# Patient Record
Sex: MALE | Race: OTHER | Employment: FULL TIME | URBAN - METROPOLITAN AREA
[De-identification: names, ages, dates, MRNs, and addresses within clinical notes are randomized per-mention and may not be internally consistent; named-entity substitution may affect disease eponyms.]

---

## 2017-01-05 ENCOUNTER — OFFICE VISIT (OUTPATIENT)
Dept: FAMILY MEDICINE CLINIC | Facility: CLINIC | Age: 37
End: 2017-01-05

## 2017-01-05 VITALS
OXYGEN SATURATION: 98 % | SYSTOLIC BLOOD PRESSURE: 122 MMHG | HEIGHT: 68 IN | HEART RATE: 91 BPM | BODY MASS INDEX: 34.1 KG/M2 | RESPIRATION RATE: 18 BRPM | WEIGHT: 225 LBS | TEMPERATURE: 98 F | DIASTOLIC BLOOD PRESSURE: 80 MMHG

## 2017-01-05 DIAGNOSIS — B34.9 SYSTEMIC VIRAL ILLNESS: Primary | ICD-10-CM

## 2017-01-05 PROCEDURE — 99213 OFFICE O/P EST LOW 20 MIN: CPT | Performed by: FAMILY MEDICINE

## 2017-01-05 RX ORDER — MECLIZINE HYDROCHLORIDE 50 MG/1
50 TABLET ORAL 3 TIMES DAILY PRN
Qty: 30 TABLET | Refills: 0 | Status: SHIPPED | OUTPATIENT
Start: 2017-01-05 | End: 2017-02-08

## 2017-01-05 RX ORDER — OSELTAMIVIR PHOSPHATE 75 MG/1
75 CAPSULE ORAL 2 TIMES DAILY
Qty: 10 CAPSULE | Refills: 0 | Status: SHIPPED | OUTPATIENT
Start: 2017-01-05 | End: 2017-01-10

## 2017-01-06 NOTE — PROGRESS NOTES
Naomi Justin is a 31year old male. Patient presents with:  Cough: congestion, dizzy, x 1 week      HPI:   Cough and congestion x1 week. Also having dizziness - lightheadedness, also for 1 week. Feels very tired. Occasional sinus headache.   No tonya as needed for Wheezing or Shortness of Breath. Disp: 1 Inhaler Rfl: 0   GLYXAMBI 25-5 MG Oral Tab TAKE 1 TABLET BY MOUTH DAILY Disp: 90 tablet Rfl: 0   Empagliflozin-Linagliptin (GLYXAMBI) 25-5 MG Oral Tab Take 1 tablet by mouth daily.  Disp: 21 tablet Rfl: tablet 0      Sig: Take 1 tablet (50 mg total) by mouth 3 (three) times daily as needed for Dizziness. Patient/Caregiver Education: Patient/Caregiver Education: There are no barriers to learning. Medical education done.    Outcome: Patient Buddy Stack

## 2017-02-08 ENCOUNTER — OFFICE VISIT (OUTPATIENT)
Dept: FAMILY MEDICINE CLINIC | Facility: CLINIC | Age: 37
End: 2017-02-08

## 2017-02-08 VITALS
RESPIRATION RATE: 16 BRPM | DIASTOLIC BLOOD PRESSURE: 84 MMHG | WEIGHT: 217 LBS | TEMPERATURE: 99 F | HEART RATE: 84 BPM | BODY MASS INDEX: 33 KG/M2 | SYSTOLIC BLOOD PRESSURE: 122 MMHG

## 2017-02-08 DIAGNOSIS — J01.00 ACUTE MAXILLARY SINUSITIS, RECURRENCE NOT SPECIFIED: Primary | ICD-10-CM

## 2017-02-08 PROCEDURE — 99213 OFFICE O/P EST LOW 20 MIN: CPT | Performed by: PHYSICIAN ASSISTANT

## 2017-02-08 RX ORDER — LEVOFLOXACIN 500 MG/1
500 TABLET, FILM COATED ORAL DAILY
Qty: 7 TABLET | Refills: 0 | Status: SHIPPED | OUTPATIENT
Start: 2017-02-08 | End: 2017-02-15 | Stop reason: ALTCHOICE

## 2017-02-08 RX ORDER — AMOXICILLIN 500 MG/1
CAPSULE ORAL
Refills: 0 | COMMUNITY
Start: 2017-01-24 | End: 2017-02-08

## 2017-02-08 NOTE — PATIENT INSTRUCTIONS
Self-Care for Sinusitis     Drinking plenty of water can help sinuses drain. Sinusitis can often be managed with self-care. Self-care can keep sinuses moist and make you feel more comfortable. Remember to follow your doctor's instructions closely.  This Colds, flu, and allergies make it more likely for you to get sinusitis. Do your best to prevent sinusitis by preventing these problems. Do what you can to avoid getting colds and other infections. Stay away from things that cause allergies (allergens).  Gurvinder Javier · Stay away from all types of smoke, which dries out sinus linings. This includes tobacco smoke and chemical smoke in workplace settings. · Use saltwater rinses.   Date Last Reviewed: 10/1/2016  © 5377-7384 The [address]

## 2017-02-08 NOTE — PROGRESS NOTES
CHIEF COMPLAINT:   Patient presents with:  Sinusitis: Symptoms started about two days ago    HPI:   Lacey Starkey is a 31year old male who presents for sinus congestion for  2  days. Symptoms have been worsening since onset.  Sinus congestion/pain is d • Fibromyalgia Mother         Smoking Status: Never Smoker                      Smokeless Status: Never Used                        Alcohol Use: Yes                Comment: 2-3 beers a week        REVIEW OF SYSTEMS:   GENERAL: feels well otherwise, no unpl Sinusitis can often be managed with self-care. Self-care can keep sinuses moist and make you feel more comfortable. Remember to follow your doctor's instructions closely. This can make a big difference in getting your sinus problem under control.   Drink fl Colds, flu, and allergies make it more likely for you to get sinusitis. Do your best to prevent sinusitis by preventing these problems. Do what you can to avoid getting colds and other infections. Stay away from things that cause allergies (allergens).  Jessy Mooney · Stay away from all types of smoke, which dries out sinus linings. This includes tobacco smoke and chemical smoke in workplace settings. · Use saltwater rinses.   Date Last Reviewed: 10/1/2016  © [id number] The [address]

## 2017-02-15 ENCOUNTER — LAB ENCOUNTER (OUTPATIENT)
Dept: LAB | Age: 37
End: 2017-02-15
Attending: PHYSICIAN ASSISTANT
Payer: COMMERCIAL

## 2017-02-15 ENCOUNTER — OFFICE VISIT (OUTPATIENT)
Dept: FAMILY MEDICINE CLINIC | Facility: CLINIC | Age: 37
End: 2017-02-15

## 2017-02-15 VITALS
RESPIRATION RATE: 16 BRPM | BODY MASS INDEX: 32.28 KG/M2 | HEART RATE: 89 BPM | DIASTOLIC BLOOD PRESSURE: 78 MMHG | SYSTOLIC BLOOD PRESSURE: 120 MMHG | WEIGHT: 213 LBS | OXYGEN SATURATION: 97 % | HEIGHT: 68 IN | TEMPERATURE: 98 F

## 2017-02-15 DIAGNOSIS — Z13.228 SCREENING FOR ENDOCRINE, METABOLIC AND IMMUNITY DISORDER: ICD-10-CM

## 2017-02-15 DIAGNOSIS — Z13.29 SCREENING FOR ENDOCRINE, METABOLIC AND IMMUNITY DISORDER: ICD-10-CM

## 2017-02-15 DIAGNOSIS — Z13.0 SCREENING FOR ENDOCRINE, METABOLIC AND IMMUNITY DISORDER: ICD-10-CM

## 2017-02-15 DIAGNOSIS — R80.9 TYPE 2 DIABETES MELLITUS WITH MICROALBUMINURIA, WITHOUT LONG-TERM CURRENT USE OF INSULIN (HCC): ICD-10-CM

## 2017-02-15 DIAGNOSIS — E11.29 TYPE 2 DIABETES MELLITUS WITH MICROALBUMINURIA, WITHOUT LONG-TERM CURRENT USE OF INSULIN (HCC): ICD-10-CM

## 2017-02-15 DIAGNOSIS — Z00.00 ROUTINE PHYSICAL EXAMINATION: Primary | ICD-10-CM

## 2017-02-15 LAB
25-HYDROXYVITAMIN D (TOTAL): 11.3 NG/ML (ref 30–100)
ALBUMIN SERPL-MCNC: 4.1 G/DL (ref 3.5–4.8)
ALP LIVER SERPL-CCNC: 112 U/L (ref 45–117)
ALT SERPL-CCNC: 43 U/L (ref 17–63)
AST SERPL-CCNC: 10 U/L (ref 15–41)
BASOPHILS # BLD AUTO: 0.04 X10(3) UL (ref 0–0.1)
BASOPHILS NFR BLD AUTO: 0.4 %
BILIRUB SERPL-MCNC: 0.5 MG/DL (ref 0.1–2)
BUN BLD-MCNC: 12 MG/DL (ref 8–20)
CALCIUM BLD-MCNC: 9.1 MG/DL (ref 8.3–10.3)
CHLORIDE: 98 MMOL/L (ref 101–111)
CHOLEST SMN-MCNC: 193 MG/DL (ref ?–200)
CO2: 29 MMOL/L (ref 22–32)
CREAT BLD-MCNC: 0.8 MG/DL (ref 0.7–1.3)
EOSINOPHIL # BLD AUTO: 0.09 X10(3) UL (ref 0–0.3)
EOSINOPHIL NFR BLD AUTO: 1 %
ERYTHROCYTE [DISTWIDTH] IN BLOOD BY AUTOMATED COUNT: 13.9 % (ref 11.5–16)
EST. AVERAGE GLUCOSE BLD GHB EST-MCNC: 275 MG/DL (ref 68–126)
GLUCOSE BLD-MCNC: 304 MG/DL (ref 70–99)
HBA1C MFR BLD HPLC: 11.2 % (ref ?–5.7)
HCT VFR BLD AUTO: 46 % (ref 37–53)
HDLC SERPL-MCNC: 29 MG/DL (ref 45–?)
HDLC SERPL: 6.66 {RATIO} (ref ?–4.97)
HGB BLD-MCNC: 15.2 G/DL (ref 13–17)
IMMATURE GRANULOCYTE COUNT: 0.04 X10(3) UL (ref 0–1)
IMMATURE GRANULOCYTE RATIO %: 0.4 %
LDLC SERPL CALC-MCNC: 101 MG/DL (ref ?–130)
LYMPHOCYTES # BLD AUTO: 3.1 X10(3) UL (ref 0.9–4)
LYMPHOCYTES NFR BLD AUTO: 34.3 %
M PROTEIN MFR SERPL ELPH: 8.6 G/DL (ref 6.1–8.3)
MCH RBC QN AUTO: 26.7 PG (ref 27–33.2)
MCHC RBC AUTO-ENTMCNC: 33 G/DL (ref 31–37)
MCV RBC AUTO: 80.8 FL (ref 80–99)
MONOCYTES # BLD AUTO: 0.48 X10(3) UL (ref 0.1–0.6)
MONOCYTES NFR BLD AUTO: 5.3 %
NEUTROPHIL ABS PRELIM: 5.3 X10 (3) UL (ref 1.3–6.7)
NEUTROPHILS # BLD AUTO: 5.3 X10(3) UL (ref 1.3–6.7)
NEUTROPHILS NFR BLD AUTO: 58.6 %
NONHDLC SERPL-MCNC: 164 MG/DL (ref ?–130)
PLATELET # BLD AUTO: 316 10(3)UL (ref 150–450)
POTASSIUM SERPL-SCNC: 3.8 MMOL/L (ref 3.6–5.1)
RBC # BLD AUTO: 5.69 X10(6)UL (ref 4.3–5.7)
RED CELL DISTRIBUTION WIDTH-SD: 40.4 FL (ref 35.1–46.3)
SODIUM SERPL-SCNC: 136 MMOL/L (ref 136–144)
TRIGLYCERIDES: 316 MG/DL (ref ?–150)
TSI SER-ACNC: 0.95 MIU/ML (ref 0.35–5.5)
VLDL: 63 MG/DL (ref 5–40)
WBC # BLD AUTO: 9.1 X10(3) UL (ref 4–13)

## 2017-02-15 PROCEDURE — 84443 ASSAY THYROID STIM HORMONE: CPT

## 2017-02-15 PROCEDURE — 85025 COMPLETE CBC W/AUTO DIFF WBC: CPT

## 2017-02-15 PROCEDURE — 80061 LIPID PANEL: CPT

## 2017-02-15 PROCEDURE — 83036 HEMOGLOBIN GLYCOSYLATED A1C: CPT

## 2017-02-15 PROCEDURE — 36415 COLL VENOUS BLD VENIPUNCTURE: CPT

## 2017-02-15 PROCEDURE — 99395 PREV VISIT EST AGE 18-39: CPT | Performed by: PHYSICIAN ASSISTANT

## 2017-02-15 PROCEDURE — 80053 COMPREHEN METABOLIC PANEL: CPT

## 2017-02-15 PROCEDURE — 82306 VITAMIN D 25 HYDROXY: CPT

## 2017-02-15 RX ORDER — MECLIZINE HYDROCHLORIDE 25 MG/1
TABLET ORAL
Refills: 0 | COMMUNITY
Start: 2017-01-05 | End: 2017-02-15 | Stop reason: ALTCHOICE

## 2017-02-15 RX ORDER — HYDROCODONE BITARTRATE AND IBUPROFEN 7.5; 2 MG/1; MG/1
TABLET, FILM COATED ORAL
Refills: 0 | COMMUNITY
Start: 2017-02-01 | End: 2017-02-15 | Stop reason: ALTCHOICE

## 2017-02-15 NOTE — PROGRESS NOTES
Susu Chambers is a 31year old male who presents for a complete physical exam.   HPI:   Pt is here for work PE. Denies complaints. Patient checks fasting sugars - running around 130-160. Last eye doctor apt was 6 months ago.  Patient sees Dr. Snodra Norton for palpitations  GI: denies abdominal pain, nausea, vomiting, diarrhea, constipation, hematochezia, heartburn  : denies dysuria, hematuria, nocuturia  MUSCULOSKELETAL: no arthralgias  NEURO: denies headaches, numbness, tingling, weakness  HEMATOLOGIC: denie

## 2017-02-16 ENCOUNTER — TELEPHONE (OUTPATIENT)
Dept: FAMILY MEDICINE CLINIC | Facility: CLINIC | Age: 37
End: 2017-02-16

## 2017-02-16 DIAGNOSIS — E55.9 VITAMIN D DEFICIENCY: Primary | ICD-10-CM

## 2017-02-16 NOTE — TELEPHONE ENCOUNTER
----- Message from Joshua Shen, [address] sent at 2/16/2017 12:14 PM CST -----  I know that the patient was just seen, but he needs to be seen to discuss options for DM treatment. Please have him schedule with Dr. Anders Oreilly or I. His A1c is now 11.2.     Vitamin

## 2017-02-17 RX ORDER — ERGOCALCIFEROL 1.25 MG/1
50000 CAPSULE ORAL WEEKLY
Qty: 12 CAPSULE | Refills: 0 | Status: SHIPPED | OUTPATIENT
Start: 2017-02-17 | End: 2017-05-03 | Stop reason: ALTCHOICE

## 2017-02-17 RX ORDER — ERGOCALCIFEROL 1.25 MG/1
50000 CAPSULE ORAL WEEKLY
Qty: 12 CAPSULE | Refills: 0 | Status: SHIPPED | OUTPATIENT
Start: 2017-02-17 | End: 2017-02-20

## 2017-02-17 NOTE — TELEPHONE ENCOUNTER
BAYLEE.  Pt called back and I verified 2 patient identifiers: name & . I discussed results and need for appt. Appt was made for Mon. Vitamin D orders placed. Pt has been reading about different once weekly injections and is interested in Trulicity.   P

## 2017-02-20 ENCOUNTER — OFFICE VISIT (OUTPATIENT)
Dept: FAMILY MEDICINE CLINIC | Facility: CLINIC | Age: 37
End: 2017-02-20

## 2017-02-20 ENCOUNTER — MED REC SCAN ONLY (OUTPATIENT)
Dept: FAMILY MEDICINE CLINIC | Facility: CLINIC | Age: 37
End: 2017-02-20

## 2017-02-20 VITALS — HEART RATE: 96 BPM | DIASTOLIC BLOOD PRESSURE: 80 MMHG | SYSTOLIC BLOOD PRESSURE: 120 MMHG | RESPIRATION RATE: 16 BRPM

## 2017-02-20 DIAGNOSIS — R80.9 TYPE 2 DIABETES MELLITUS WITH MICROALBUMINURIA, WITHOUT LONG-TERM CURRENT USE OF INSULIN (HCC): ICD-10-CM

## 2017-02-20 DIAGNOSIS — E55.9 VITAMIN D DEFICIENCY: ICD-10-CM

## 2017-02-20 DIAGNOSIS — R03.0 BLOOD PRESSURE ELEVATED WITHOUT HISTORY OF HTN: ICD-10-CM

## 2017-02-20 DIAGNOSIS — E11.29 TYPE 2 DIABETES MELLITUS WITH MICROALBUMINURIA, WITHOUT LONG-TERM CURRENT USE OF INSULIN (HCC): ICD-10-CM

## 2017-02-20 PROCEDURE — 99214 OFFICE O/P EST MOD 30 MIN: CPT | Performed by: FAMILY MEDICINE

## 2017-02-20 NOTE — PROGRESS NOTES
Patient presents with:  Diabetes      George Rodriguez is a 31year old year old who presents for recheck of diabetes. Pt has been checking feet on a regular basis. Pt denies any tingling of the feet. Pt denies any sx's of depression.   Pt complains of wo mouth once a week., Disp: 12 capsule, Rfl: 0  •  MetFORMIN HCl 1000 MG Oral Tab, TAKE 1 TABLET BY MOUTH TWICE DAILY WITH MEALS, Disp: 180 tablet, Rfl: 0     Last documented BP: 120/80      HEMOGLOBIN A1C   Date Value   07/22/2015 10.7 %*   04/17/2015 10.2 urinating. Musculoskeletal: Negative for myalgias, back pain, joint swelling, arthralgias and gait problem. Skin: Negative for color change and rash. Neurological: Negative for dizziness, syncope, weakness, numbness, tingling and headaches.    Hematol Comment: 2-3 beers a week      Physical Exam  /80 mmHg  Pulse 96  Resp 16  Constitutional: Oriented to person, place, and time. No distress. HEENT:  Normocephalic and atraumatic.  Hearing and tympanic membranes normal.  Nose normal. Oropharynx is cl Using Medications, Monitoring, Preventing Acute Complications, Preventing Chronic Complications, Behavior Change Strategies, Risk Reduction Strategies   Reducing Risk: Daily foot checks, BP Control, Lipid Control, Dilated eye exam, Skin/dental care, Urine

## 2017-03-08 ENCOUNTER — PATIENT OUTREACH (OUTPATIENT)
Dept: FAMILY MEDICINE CLINIC | Facility: CLINIC | Age: 37
End: 2017-03-08

## 2017-04-18 ENCOUNTER — PATIENT MESSAGE (OUTPATIENT)
Dept: FAMILY MEDICINE CLINIC | Facility: CLINIC | Age: 37
End: 2017-04-18

## 2017-04-18 DIAGNOSIS — R80.9 TYPE 2 DIABETES MELLITUS WITH MICROALBUMINURIA, WITHOUT LONG-TERM CURRENT USE OF INSULIN (HCC): Primary | ICD-10-CM

## 2017-04-18 DIAGNOSIS — E11.29 TYPE 2 DIABETES MELLITUS WITH MICROALBUMINURIA, WITHOUT LONG-TERM CURRENT USE OF INSULIN (HCC): Primary | ICD-10-CM

## 2017-04-18 DIAGNOSIS — R80.9 TYPE 2 DIABETES MELLITUS WITH MICROALBUMINURIA, WITHOUT LONG-TERM CURRENT USE OF INSULIN (HCC): ICD-10-CM

## 2017-04-18 DIAGNOSIS — E11.29 TYPE 2 DIABETES MELLITUS WITH MICROALBUMINURIA, WITHOUT LONG-TERM CURRENT USE OF INSULIN (HCC): ICD-10-CM

## 2017-04-18 NOTE — TELEPHONE ENCOUNTER
From: Mehnaz Payne  To: Monisha Mariano MD  Sent: 4/18/2017 8:00 AM CDT  Subject: Prescription Question    I ran out of the diabetes pills you gave me and i cant ask for a prescription as they are not on my prescription list. Kaylee Chung should i do?

## 2017-04-19 NOTE — TELEPHONE ENCOUNTER
From: Edith Jaramillo  To:  Sarah Christiansen MD  Sent: 4/18/2017 8:58 PM CDT  Subject: Medication Renewal Request    Original authorizing provider: MD Edith Cooper would like a refill of the following medications:  MetFORMIN HCl 1000 MG

## 2017-04-19 NOTE — TELEPHONE ENCOUNTER
LF: 1/12/17 LOV: 2/20/17  Please approve or deny pending Rx. Thank you! Jose Borne was refilled for patient on 4/18/17.

## 2017-05-03 ENCOUNTER — OFFICE VISIT (OUTPATIENT)
Dept: FAMILY MEDICINE CLINIC | Facility: CLINIC | Age: 37
End: 2017-05-03

## 2017-05-03 VITALS
WEIGHT: 212 LBS | SYSTOLIC BLOOD PRESSURE: 122 MMHG | RESPIRATION RATE: 16 BRPM | HEART RATE: 86 BPM | TEMPERATURE: 99 F | BODY MASS INDEX: 32 KG/M2 | DIASTOLIC BLOOD PRESSURE: 86 MMHG | OXYGEN SATURATION: 97 %

## 2017-05-03 DIAGNOSIS — R22.41 KNEE MASS, RIGHT: ICD-10-CM

## 2017-05-03 DIAGNOSIS — R80.9 TYPE 2 DIABETES MELLITUS WITH MICROALBUMINURIA, WITHOUT LONG-TERM CURRENT USE OF INSULIN (HCC): Primary | ICD-10-CM

## 2017-05-03 DIAGNOSIS — E11.29 TYPE 2 DIABETES MELLITUS WITH MICROALBUMINURIA, WITHOUT LONG-TERM CURRENT USE OF INSULIN (HCC): Primary | ICD-10-CM

## 2017-05-03 PROCEDURE — 99214 OFFICE O/P EST MOD 30 MIN: CPT | Performed by: PHYSICIAN ASSISTANT

## 2017-05-03 NOTE — PROGRESS NOTES
Patient presents with:  Lump: Pt c/o painful lump on RT knee X 2 months     HPI:     Garfield Bocanegra is a 31year old male who presents today with a chief complaint of  right knee pain for 2 months. He has a painful lump int he front of the knee.  Lump hu pain   - able to bear weight - yes   -  Skin intact:  Yes  -  Normal sensation: Yes  -  Normal capillary refill: Yes      Assessment/Plan:       Type 2 diabetes mellitus with microalbuminuria, without long-term current use of insulin (HCC)  -     Microalb/

## 2017-05-04 ENCOUNTER — HOSPITAL ENCOUNTER (OUTPATIENT)
Dept: ULTRASOUND IMAGING | Facility: HOSPITAL | Age: 37
Discharge: HOME | End: 2017-05-04
Attending: PHYSICIAN ASSISTANT
Payer: COMMERCIAL

## 2017-05-04 DIAGNOSIS — R22.41 KNEE MASS, RIGHT: ICD-10-CM

## 2017-05-04 PROCEDURE — 76882 US LMTD JT/FCL EVL NVASC XTR: CPT

## 2017-05-05 ENCOUNTER — TELEPHONE (OUTPATIENT)
Dept: FAMILY MEDICINE CLINIC | Facility: CLINIC | Age: 37
End: 2017-05-05

## 2017-05-05 DIAGNOSIS — M70.51 PES ANSERINUS BURSITIS OF RIGHT KNEE: Primary | ICD-10-CM

## 2017-05-17 DIAGNOSIS — R80.9 TYPE 2 DIABETES MELLITUS WITH MICROALBUMINURIA, WITHOUT LONG-TERM CURRENT USE OF INSULIN (HCC): ICD-10-CM

## 2017-05-17 DIAGNOSIS — E11.29 TYPE 2 DIABETES MELLITUS WITH MICROALBUMINURIA, WITHOUT LONG-TERM CURRENT USE OF INSULIN (HCC): ICD-10-CM

## 2017-05-17 NOTE — TELEPHONE ENCOUNTER
From: Markus Marquez  To:  Yareli Chris MD  Sent: 5/17/2017 8:27 AM CDT  Subject: Medication Renewal Request    Original authorizing provider: MD Markus Leyva would like a refill of the following medications:  Empagliflozin (Maryan Tan

## 2017-06-29 ENCOUNTER — TELEPHONE (OUTPATIENT)
Dept: FAMILY MEDICINE CLINIC | Facility: CLINIC | Age: 37
End: 2017-06-29

## 2017-07-08 NOTE — TELEPHONE ENCOUNTER
A prior auth request form was received from [facility] for the pts Jardiance 25 mg. Form was completed and faxed back to [facility] at [phone]. Fax confirmation was received.

## 2017-07-11 NOTE — TELEPHONE ENCOUNTER
Spoke with Zaida Tavares at [employer], no PA needed for [employer]. Only requirement for drug is that a 90 day supply must be sent to [facility] mail order pharmacy. Please approve rx.

## 2017-10-02 NOTE — TELEPHONE ENCOUNTER
From: Maria A Leal  Sent: 10/1/2017 4:11 PM CDT  Subject: Medication Renewal Request    Maria A Leal would like a refill of the following medications:     MetFORMIN HCl 1000 MG Oral Tab Padmaja Alexandre MD]    Preferred pharmacy: [place] [id number]

## 2017-10-02 NOTE — TELEPHONE ENCOUNTER
MetFORMIN HCl 1000 MG Oral Tab  TAKE 1 TABLET BY MOUTH TWICE DAILY WITH MEALS       Disp: 180 tablet Refills: 0    Class: Normal Start: 10/2/2017   Originally ordered: 1 year ago by Adi Anderson MD  Diabetic Medication Protocol [id number] 8:11 AM   [facility]

## 2017-12-20 ENCOUNTER — OFFICE VISIT (OUTPATIENT)
Dept: FAMILY MEDICINE CLINIC | Facility: CLINIC | Age: 37
End: 2017-12-20

## 2017-12-20 VITALS
RESPIRATION RATE: 16 BRPM | OXYGEN SATURATION: 98 % | DIASTOLIC BLOOD PRESSURE: 64 MMHG | TEMPERATURE: 98 F | HEIGHT: 67.5 IN | HEART RATE: 82 BPM | BODY MASS INDEX: 32.73 KG/M2 | WEIGHT: 211 LBS | SYSTOLIC BLOOD PRESSURE: 100 MMHG

## 2017-12-20 DIAGNOSIS — E55.9 VITAMIN D DEFICIENCY: ICD-10-CM

## 2017-12-20 DIAGNOSIS — R80.9 TYPE 2 DIABETES MELLITUS WITH MICROALBUMINURIA, WITHOUT LONG-TERM CURRENT USE OF INSULIN (HCC): ICD-10-CM

## 2017-12-20 DIAGNOSIS — E11.29 TYPE 2 DIABETES MELLITUS WITH MICROALBUMINURIA, WITHOUT LONG-TERM CURRENT USE OF INSULIN (HCC): ICD-10-CM

## 2017-12-20 DIAGNOSIS — E78.1 PURE HYPERGLYCERIDEMIA: ICD-10-CM

## 2017-12-20 DIAGNOSIS — M62.838 NECK MUSCLE SPASM: Primary | ICD-10-CM

## 2017-12-20 PROCEDURE — 99214 OFFICE O/P EST MOD 30 MIN: CPT | Performed by: FAMILY MEDICINE

## 2017-12-20 RX ORDER — DICLOFENAC SODIUM 75 MG/1
75 TABLET, DELAYED RELEASE ORAL 2 TIMES DAILY PRN
Qty: 30 TABLET | Refills: 1 | Status: SHIPPED | OUTPATIENT
Start: 2017-12-20 | End: 2018-02-02

## 2017-12-20 RX ORDER — CYCLOBENZAPRINE HCL 10 MG
10 TABLET ORAL 2 TIMES DAILY PRN
Qty: 30 TABLET | Refills: 0 | Status: SHIPPED | OUTPATIENT
Start: 2017-12-20 | End: 2018-01-08 | Stop reason: ALTCHOICE

## 2017-12-20 NOTE — PROGRESS NOTES
CC: neck pain    Patient complains of neck pain.     Symptoms for: few weeks  Location: posterior neck R>L  Severity: moderate  Pain radiates to: right posterior neck  Caused by: prolonged gaze, driving and in front of computer   Worsened by: movement, wors spasm  -nsaids, flexeril prn  - Diclofenac Sodium 75 MG Oral Tab EC; Take 1 tablet (75 mg total) by mouth 2 (two) times daily as needed. Dispense: 30 tablet; Refill: 1  - Cyclobenzaprine HCl 10 MG Oral Tab;  Take 1 tablet (10 mg total) by mouth 2 (two) julissa

## 2018-01-02 ENCOUNTER — LAB ENCOUNTER (OUTPATIENT)
Dept: LAB | Age: 38
End: 2018-01-02
Attending: FAMILY MEDICINE
Payer: COMMERCIAL

## 2018-01-02 DIAGNOSIS — R80.9 TYPE 2 DIABETES MELLITUS WITH MICROALBUMINURIA, WITHOUT LONG-TERM CURRENT USE OF INSULIN (HCC): ICD-10-CM

## 2018-01-02 DIAGNOSIS — E11.29 TYPE 2 DIABETES MELLITUS WITH MICROALBUMINURIA, WITHOUT LONG-TERM CURRENT USE OF INSULIN (HCC): ICD-10-CM

## 2018-01-02 DIAGNOSIS — E55.9 VITAMIN D DEFICIENCY: ICD-10-CM

## 2018-01-02 DIAGNOSIS — E78.1 PURE HYPERGLYCERIDEMIA: ICD-10-CM

## 2018-01-02 LAB
25-HYDROXYVITAMIN D (TOTAL): 16 NG/ML (ref 30–100)
CHOLEST SMN-MCNC: 196 MG/DL (ref ?–200)
CREAT UR-SCNC: 46 MG/DL
EST. AVERAGE GLUCOSE BLD GHB EST-MCNC: 298 MG/DL (ref 68–126)
HBA1C MFR BLD HPLC: 12 % (ref ?–5.7)
HDLC SERPL-MCNC: 30 MG/DL (ref 45–?)
HDLC SERPL: 6.53 {RATIO} (ref ?–4.97)
LDLC SERPL DIRECT ASSAY-MCNC: 117 MG/DL (ref ?–100)
MICROALBUMIN UR-MCNC: 13.7 MG/DL
MICROALBUMIN/CREAT 24H UR-RTO: 297.8 UG/MG (ref ?–30)
NONHDLC SERPL-MCNC: 166 MG/DL (ref ?–130)
TRIGL SERPL-MCNC: 433 MG/DL (ref ?–150)

## 2018-01-02 PROCEDURE — 83036 HEMOGLOBIN GLYCOSYLATED A1C: CPT | Performed by: FAMILY MEDICINE

## 2018-01-02 PROCEDURE — 36415 COLL VENOUS BLD VENIPUNCTURE: CPT | Performed by: FAMILY MEDICINE

## 2018-01-02 PROCEDURE — 83721 ASSAY OF BLOOD LIPOPROTEIN: CPT | Performed by: FAMILY MEDICINE

## 2018-01-02 PROCEDURE — 82043 UR ALBUMIN QUANTITATIVE: CPT | Performed by: PHYSICIAN ASSISTANT

## 2018-01-02 PROCEDURE — 80061 LIPID PANEL: CPT | Performed by: FAMILY MEDICINE

## 2018-01-02 PROCEDURE — 82306 VITAMIN D 25 HYDROXY: CPT | Performed by: FAMILY MEDICINE

## 2018-01-02 PROCEDURE — 82570 ASSAY OF URINE CREATININE: CPT | Performed by: PHYSICIAN ASSISTANT

## 2018-01-04 ENCOUNTER — PATIENT MESSAGE (OUTPATIENT)
Dept: FAMILY MEDICINE CLINIC | Facility: CLINIC | Age: 38
End: 2018-01-04

## 2018-01-04 DIAGNOSIS — E55.9 VITAMIN D DEFICIENCY: ICD-10-CM

## 2018-01-04 DIAGNOSIS — E78.1 HYPERTRIGLYCERIDEMIA: ICD-10-CM

## 2018-01-04 DIAGNOSIS — E11.9 DIABETES MELLITUS WITHOUT COMPLICATION (HCC): Primary | ICD-10-CM

## 2018-01-04 RX ORDER — ERGOCALCIFEROL 1.25 MG/1
50000 CAPSULE ORAL WEEKLY
Qty: 12 CAPSULE | Refills: 0 | Status: SHIPPED | OUTPATIENT
Start: 2018-01-04 | End: 2018-01-08

## 2018-01-04 RX ORDER — ICOSAPENT ETHYL 1000 MG/1
2 CAPSULE ORAL 2 TIMES DAILY
Qty: 60 CAPSULE | Refills: 2 | Status: SHIPPED | OUTPATIENT
Start: 2018-01-04 | End: 2018-01-08

## 2018-01-04 NOTE — TELEPHONE ENCOUNTER
----- Message from Magalie Peterson PA-C sent at 1/3/2018  1:42 PM CST -----  Patient with poorly controlled DM. Real Torrez please refer to rick asher with DM clinic.  Low vitamin d Please have patient take Rx of 50,000 units of vitamin D once per week for 3 mo

## 2018-01-04 NOTE — TELEPHONE ENCOUNTER
From: Aldair Israel  To: Suzie Kaur MD  Sent: 1/4/2018 9:01 AM CST  Subject: Test Results Question    I noticed my A1c results are not showing. Did I not get an A1c lab order?

## 2018-01-04 NOTE — TELEPHONE ENCOUNTER
Patient provided with test results and plan of care below. Prescriptions sent to pharmacy on file. Lab orders placed for 3 month re-draw. Patient advised to call if any questions or concerns.

## 2018-01-08 ENCOUNTER — OFFICE VISIT (OUTPATIENT)
Dept: FAMILY MEDICINE CLINIC | Facility: CLINIC | Age: 38
End: 2018-01-08

## 2018-01-08 VITALS
SYSTOLIC BLOOD PRESSURE: 106 MMHG | OXYGEN SATURATION: 98 % | BODY MASS INDEX: 30.77 KG/M2 | DIASTOLIC BLOOD PRESSURE: 78 MMHG | RESPIRATION RATE: 15 BRPM | HEIGHT: 68 IN | TEMPERATURE: 98 F | HEART RATE: 89 BPM | WEIGHT: 203 LBS

## 2018-01-08 DIAGNOSIS — E55.9 VITAMIN D DEFICIENCY: ICD-10-CM

## 2018-01-08 DIAGNOSIS — R80.9 TYPE 2 DIABETES MELLITUS WITH MICROALBUMINURIA, WITHOUT LONG-TERM CURRENT USE OF INSULIN (HCC): ICD-10-CM

## 2018-01-08 DIAGNOSIS — E78.1 PURE HYPERGLYCERIDEMIA: ICD-10-CM

## 2018-01-08 DIAGNOSIS — E11.29 TYPE 2 DIABETES MELLITUS WITH MICROALBUMINURIA, WITHOUT LONG-TERM CURRENT USE OF INSULIN (HCC): ICD-10-CM

## 2018-01-08 DIAGNOSIS — M54.2 NECK PAIN: Primary | ICD-10-CM

## 2018-01-08 PROCEDURE — 99214 OFFICE O/P EST MOD 30 MIN: CPT | Performed by: PHYSICIAN ASSISTANT

## 2018-01-08 RX ORDER — ICOSAPENT ETHYL 1000 MG/1
2 CAPSULE ORAL 2 TIMES DAILY
Qty: 360 CAPSULE | Refills: 0 | Status: SHIPPED | OUTPATIENT
Start: 2018-01-08 | End: 2018-04-08

## 2018-01-08 RX ORDER — ERGOCALCIFEROL 1.25 MG/1
50000 CAPSULE ORAL WEEKLY
Qty: 12 CAPSULE | Refills: 0 | Status: SHIPPED | OUTPATIENT
Start: 2018-01-08 | End: 2018-04-08

## 2018-01-08 NOTE — PROGRESS NOTES
Patient presents with:  Neck Pain: F/u on neck and upper back sharp pain X 1 month    HPI:     Michele Mcnulty is a 30year old male who presents to follow up on neck pain. He was recently seen and treated with diclofenac and flexeril.  Medication helped bilateral and equal and symmetric. Normal gait.   Neck exam:   - Pericervical Tenderness:  No   - Trapezius Tenderness:  No   - C-spine Tenderness:  Yes C7   - Normal Arm Sensation:  Yes   - Normal  Strength:  Yes   - Movements: FROM with pain with rota

## 2018-01-12 ENCOUNTER — OFFICE VISIT (OUTPATIENT)
Dept: ENDOCRINOLOGY CLINIC | Facility: CLINIC | Age: 38
End: 2018-01-12

## 2018-01-12 VITALS
BODY MASS INDEX: 31.83 KG/M2 | RESPIRATION RATE: 18 BRPM | DIASTOLIC BLOOD PRESSURE: 62 MMHG | HEART RATE: 80 BPM | SYSTOLIC BLOOD PRESSURE: 102 MMHG | WEIGHT: 210 LBS | HEIGHT: 68 IN | TEMPERATURE: 98 F

## 2018-01-12 DIAGNOSIS — R80.9 UNCONTROLLED TYPE 2 DIABETES MELLITUS WITH MICROALBUMINURIA, UNSPECIFIED LONG TERM INSULIN USE STATUS: Primary | ICD-10-CM

## 2018-01-12 DIAGNOSIS — E11.65 UNCONTROLLED TYPE 2 DIABETES MELLITUS WITH MICROALBUMINURIA, UNSPECIFIED LONG TERM INSULIN USE STATUS: Primary | ICD-10-CM

## 2018-01-12 DIAGNOSIS — E11.29 UNCONTROLLED TYPE 2 DIABETES MELLITUS WITH MICROALBUMINURIA, UNSPECIFIED LONG TERM INSULIN USE STATUS: Primary | ICD-10-CM

## 2018-01-12 DIAGNOSIS — R03.0 BLOOD PRESSURE ELEVATED WITHOUT HISTORY OF HTN: ICD-10-CM

## 2018-01-12 PROBLEM — IMO0002 UNCONTROLLED TYPE 2 DIABETES MELLITUS WITH MICROALBUMINURIA: Status: ACTIVE | Noted: 2018-01-12

## 2018-01-12 PROCEDURE — 99214 OFFICE O/P EST MOD 30 MIN: CPT | Performed by: NURSE PRACTITIONER

## 2018-01-12 RX ORDER — LISINOPRIL 2.5 MG/1
2.5 TABLET ORAL DAILY
Qty: 90 TABLET | Refills: 0 | Status: SHIPPED | OUTPATIENT
Start: 2018-01-12 | End: 2018-05-28

## 2018-01-12 NOTE — PROGRESS NOTES
CC: Patient presents with:  Diabetes: new pt. referred by PCP. pt check his sugars at home forgot meter.       HISTORY:  Past Medical History:   Diagnosis Date   • Bell's palsy    • Blood pressure elevated without history of HTN    • Other and unspecified h for 4 years   Average Fasting glucose doesn't check   Average post meal glucose doesn't check    Hypoglycemia frequency denies   Hyperlipidemia: aversion to injections     Hypertension:  Blood Pressure:   At goal    Medication: none  SE denies     Hyperlipi Pulse 80   Temp 97.9 °F (36.6 °C) (Oral)   Resp 18   Ht 68\"   Wt 210 lb   BMI 31.93 kg/m²   Constitutional: Oriented to person, place, and time. No distress. HEENT: Normocephalic and atraumatic.     Eyes: Conjunctivae are normal.   Neck: Normal range of Over 30 min of 45 min visit on ed and counseling. No orders of the defined types were placed in this encounter.       Meds & Refills for this Visit:  Signed Prescriptions Disp Refills    lisinopril 2.5 MG Oral Tab 90 tablet 0      Sig: Take 1 tablet (2.

## 2018-01-12 NOTE — PATIENT INSTRUCTIONS
Continue metformin twice a day am and pm with food   Start Soliqua 20 units daily every 3 days go up 4 units until am readings are under 130 mg/dl   Test sugar 3 times daily at varied times   Start lisinopril 2.5 mg daily for kidney protection   Return in

## 2018-01-12 NOTE — PROGRESS NOTES
Friddie Shape  : 1980 was seen for Injection Instruction:    Date: 2018       /62   Pulse 80   Temp 97.9 °F (36.6 °C) (Oral)   Resp 18   Ht 68\"   Wt 210 lb   BMI 31.93 kg/m²     Medication type, dose and frequency:Soliqua 20 units e

## 2018-01-15 ENCOUNTER — TELEPHONE (OUTPATIENT)
Dept: INTERNAL MEDICINE CLINIC | Facility: CLINIC | Age: 38
End: 2018-01-15

## 2018-01-15 DIAGNOSIS — E11.29 UNCONTROLLED TYPE 2 DIABETES MELLITUS WITH MICROALBUMINURIA, UNSPECIFIED LONG TERM INSULIN USE STATUS: ICD-10-CM

## 2018-01-15 DIAGNOSIS — R80.9 UNCONTROLLED TYPE 2 DIABETES MELLITUS WITH MICROALBUMINURIA, UNSPECIFIED LONG TERM INSULIN USE STATUS: ICD-10-CM

## 2018-01-15 DIAGNOSIS — E11.65 UNCONTROLLED TYPE 2 DIABETES MELLITUS WITH MICROALBUMINURIA, UNSPECIFIED LONG TERM INSULIN USE STATUS: ICD-10-CM

## 2018-01-15 NOTE — TELEPHONE ENCOUNTER
Patient would like clarification on medication. He states he was given an insulin pen and believes he was told to increase by 3 units every day until his blood sugar monitor shows 130. His notes states 3 units every 3 days.   Also, he started at 15 units,

## 2018-01-15 NOTE — TELEPHONE ENCOUNTER
Called pt spoke to him is up to 23 units daily but BG is still 250 this morning so he will continue to go up 2 units more every 3 days pt verbalize understanding.

## 2018-01-29 ENCOUNTER — NURSE ONLY (OUTPATIENT)
Dept: ENDOCRINOLOGY CLINIC | Facility: CLINIC | Age: 38
End: 2018-01-29

## 2018-01-29 VITALS
SYSTOLIC BLOOD PRESSURE: 113 MMHG | DIASTOLIC BLOOD PRESSURE: 72 MMHG | HEIGHT: 67 IN | BODY MASS INDEX: 32.8 KG/M2 | WEIGHT: 209 LBS | HEART RATE: 76 BPM

## 2018-01-29 DIAGNOSIS — E11.65 UNCONTROLLED TYPE 2 DIABETES MELLITUS WITH MICROALBUMINURIA, UNSPECIFIED LONG TERM INSULIN USE STATUS: Primary | ICD-10-CM

## 2018-01-29 DIAGNOSIS — R80.9 UNCONTROLLED TYPE 2 DIABETES MELLITUS WITH MICROALBUMINURIA, UNSPECIFIED LONG TERM INSULIN USE STATUS: Primary | ICD-10-CM

## 2018-01-29 DIAGNOSIS — E11.29 UNCONTROLLED TYPE 2 DIABETES MELLITUS WITH MICROALBUMINURIA, UNSPECIFIED LONG TERM INSULIN USE STATUS: Primary | ICD-10-CM

## 2018-01-29 DIAGNOSIS — R80.9 TYPE 2 DIABETES MELLITUS WITH MICROALBUMINURIA, WITHOUT LONG-TERM CURRENT USE OF INSULIN (HCC): ICD-10-CM

## 2018-01-29 DIAGNOSIS — E11.29 TYPE 2 DIABETES MELLITUS WITH MICROALBUMINURIA, WITHOUT LONG-TERM CURRENT USE OF INSULIN (HCC): ICD-10-CM

## 2018-01-29 PROCEDURE — G0108 DIAB MANAGE TRN  PER INDIV: HCPCS

## 2018-01-29 NOTE — PROGRESS NOTES
Shimon Luus  DOB6/20/1980 was seen for Diabetic Education Initial visit\"    Date: 1/29/2018         Assessment: /72   Pulse 76   Ht 67\"   Wt 209 lb   BMI 32.73 kg/m²       HEMOGLOBIN A1C (%)   Date Value   07/22/2015 10.7 (A)   ----------  Hg

## 2018-02-02 ENCOUNTER — HOSPITAL ENCOUNTER (EMERGENCY)
Age: 38
Discharge: HOME | End: 2018-02-02
Attending: EMERGENCY MEDICINE
Payer: COMMERCIAL

## 2018-02-02 ENCOUNTER — OFFICE VISIT (OUTPATIENT)
Dept: FAMILY MEDICINE CLINIC | Facility: CLINIC | Age: 38
End: 2018-02-02

## 2018-02-02 VITALS
HEIGHT: 67 IN | HEART RATE: 78 BPM | OXYGEN SATURATION: 98 % | DIASTOLIC BLOOD PRESSURE: 80 MMHG | RESPIRATION RATE: 16 BRPM | BODY MASS INDEX: 32.8 KG/M2 | WEIGHT: 209 LBS | SYSTOLIC BLOOD PRESSURE: 118 MMHG | TEMPERATURE: 98 F

## 2018-02-02 VITALS
SYSTOLIC BLOOD PRESSURE: 95 MMHG | TEMPERATURE: 97 F | RESPIRATION RATE: 18 BRPM | BODY MASS INDEX: 31.67 KG/M2 | HEIGHT: 68 IN | OXYGEN SATURATION: 98 % | WEIGHT: 209 LBS | DIASTOLIC BLOOD PRESSURE: 70 MMHG | HEART RATE: 81 BPM

## 2018-02-02 DIAGNOSIS — R19.7 DIARRHEA, UNSPECIFIED TYPE: Primary | ICD-10-CM

## 2018-02-02 DIAGNOSIS — Z02.9 ENCOUNTERS FOR ADMINISTRATIVE PURPOSES: Primary | ICD-10-CM

## 2018-02-02 LAB
ALBUMIN SERPL-MCNC: 3.3 G/DL (ref 3.5–4.8)
ALP LIVER SERPL-CCNC: 86 U/L (ref 45–117)
ALT SERPL-CCNC: 36 U/L (ref 17–63)
AST SERPL-CCNC: 17 U/L (ref 15–41)
BASOPHILS # BLD AUTO: 0.02 X10(3) UL (ref 0–0.1)
BASOPHILS NFR BLD AUTO: 0.2 %
BILIRUB SERPL-MCNC: 0.2 MG/DL (ref 0.1–2)
BILIRUB UR QL STRIP.AUTO: NEGATIVE
BUN BLD-MCNC: 11 MG/DL (ref 8–20)
CALCIUM BLD-MCNC: 8.5 MG/DL (ref 8.3–10.3)
CHLORIDE: 101 MMOL/L (ref 101–111)
CLARITY UR REFRACT.AUTO: CLEAR
CO2: 29 MMOL/L (ref 22–32)
COLOR UR AUTO: YELLOW
CREAT BLD-MCNC: 0.54 MG/DL (ref 0.7–1.3)
EOSINOPHIL # BLD AUTO: 0.12 X10(3) UL (ref 0–0.3)
EOSINOPHIL NFR BLD AUTO: 1.4 %
ERYTHROCYTE [DISTWIDTH] IN BLOOD BY AUTOMATED COUNT: 13.6 % (ref 11.5–16)
GLUCOSE BLD-MCNC: 146 MG/DL (ref 70–99)
GLUCOSE UR STRIP.AUTO-MCNC: NEGATIVE MG/DL
HCT VFR BLD AUTO: 42.3 % (ref 37–53)
HGB BLD-MCNC: 13.9 G/DL (ref 13–17)
IMMATURE GRANULOCYTE COUNT: 0.03 X10(3) UL (ref 0–1)
IMMATURE GRANULOCYTE RATIO %: 0.4 %
KETONES UR STRIP.AUTO-MCNC: NEGATIVE MG/DL
LEUKOCYTE ESTERASE UR QL STRIP.AUTO: NEGATIVE
LYMPHOCYTES # BLD AUTO: 1.86 X10(3) UL (ref 0.9–4)
LYMPHOCYTES NFR BLD AUTO: 22.5 %
M PROTEIN MFR SERPL ELPH: 7.7 G/DL (ref 6.1–8.3)
MCH RBC QN AUTO: 27 PG (ref 27–33.2)
MCHC RBC AUTO-ENTMCNC: 32.9 G/DL (ref 31–37)
MCV RBC AUTO: 82.3 FL (ref 80–99)
MONOCYTES # BLD AUTO: 0.66 X10(3) UL (ref 0.1–0.6)
MONOCYTES NFR BLD AUTO: 8 %
NEUTROPHIL ABS PRELIM: 5.59 X10 (3) UL (ref 1.3–6.7)
NEUTROPHILS # BLD AUTO: 5.59 X10(3) UL (ref 1.3–6.7)
NEUTROPHILS NFR BLD AUTO: 67.5 %
NITRITE UR QL STRIP.AUTO: NEGATIVE
PH UR STRIP.AUTO: 5.5 [PH] (ref 4.5–8)
PLATELET # BLD AUTO: 295 10(3)UL (ref 150–450)
POTASSIUM SERPL-SCNC: 3.9 MMOL/L (ref 3.6–5.1)
RBC # BLD AUTO: 5.14 X10(6)UL (ref 4.3–5.7)
RBC UR QL AUTO: NEGATIVE
RED CELL DISTRIBUTION WIDTH-SD: 41 FL (ref 35.1–46.3)
SODIUM SERPL-SCNC: 138 MMOL/L (ref 136–144)
SP GR UR STRIP.AUTO: 1.02 (ref 1–1.03)
UROBILINOGEN UR STRIP.AUTO-MCNC: 0.2 MG/DL
WBC # BLD AUTO: 8.3 X10(3) UL (ref 4–13)

## 2018-02-02 PROCEDURE — 96360 HYDRATION IV INFUSION INIT: CPT

## 2018-02-02 PROCEDURE — 81001 URINALYSIS AUTO W/SCOPE: CPT | Performed by: EMERGENCY MEDICINE

## 2018-02-02 PROCEDURE — 99284 EMERGENCY DEPT VISIT MOD MDM: CPT

## 2018-02-02 PROCEDURE — 80053 COMPREHEN METABOLIC PANEL: CPT | Performed by: EMERGENCY MEDICINE

## 2018-02-02 PROCEDURE — 85025 COMPLETE CBC W/AUTO DIFF WBC: CPT | Performed by: EMERGENCY MEDICINE

## 2018-02-02 RX ORDER — ONDANSETRON 2 MG/ML
4 INJECTION INTRAMUSCULAR; INTRAVENOUS ONCE
Status: DISCONTINUED | OUTPATIENT
Start: 2018-02-02 | End: 2018-02-02

## 2018-02-02 NOTE — PROGRESS NOTES
S:  Patient reports abdominal discomfort for 4 days. Patient denies fevers, chills, sweats. Patient also with diarrhea about 12 times per days. No vomiting, but patient is feeling nauseous.   Patient has been taking imodium- took about 5 capsules and sti

## 2018-02-02 NOTE — ED PROVIDER NOTES
Patient Seen in: [facility] Emergency Department In [place]    History   Patient presents with:  Nausea/Vomiting/Diarrhea (gastrointestinal)    Stated Complaint: diarrhea x 3-4 days with abd discomfort. no vomiting.     HPI    26-year-old white male who pr °C) (Temporal)   Resp 18   Ht 172.7 cm (5' 8\")   Wt 94.8 kg   SpO2 98%   BMI 31.78 kg/m²         Physical Exam    Well-developed well-nourished male who is sitting on the gurney, he is awake and alert and appears in no acute discomfort or distress.     Vit Please view results for these tests on the individual orders.    RAINBOW DRAW LAVENDER   RAINBOW DRAW LIGHT GREEN   C. DIFFICILE(TOXIGENIC)PCR   OCCULT BLOOD, STOOL   STOOL CULTURE [facility]       ED Course as of Feb 02 1317  ------------------------

## 2018-02-02 NOTE — ED INITIAL ASSESSMENT (HPI)
C/o abd discomfort since Wednesday, yesterday started having watery stools 12x, today 6x. No vomiting.

## 2018-02-12 ENCOUNTER — OFFICE VISIT (OUTPATIENT)
Dept: ENDOCRINOLOGY CLINIC | Facility: CLINIC | Age: 38
End: 2018-02-12

## 2018-02-12 VITALS
WEIGHT: 211 LBS | HEART RATE: 72 BPM | DIASTOLIC BLOOD PRESSURE: 60 MMHG | HEIGHT: 67 IN | SYSTOLIC BLOOD PRESSURE: 98 MMHG | TEMPERATURE: 98 F | RESPIRATION RATE: 18 BRPM | BODY MASS INDEX: 33.12 KG/M2

## 2018-02-12 DIAGNOSIS — E11.65 UNCONTROLLED TYPE 2 DIABETES MELLITUS WITH MICROALBUMINURIA, UNSPECIFIED LONG TERM INSULIN USE STATUS: ICD-10-CM

## 2018-02-12 DIAGNOSIS — R80.9 UNCONTROLLED TYPE 2 DIABETES MELLITUS WITH MICROALBUMINURIA, UNSPECIFIED LONG TERM INSULIN USE STATUS: ICD-10-CM

## 2018-02-12 DIAGNOSIS — E11.29 UNCONTROLLED TYPE 2 DIABETES MELLITUS WITH MICROALBUMINURIA, UNSPECIFIED LONG TERM INSULIN USE STATUS: ICD-10-CM

## 2018-02-12 PROCEDURE — 99213 OFFICE O/P EST LOW 20 MIN: CPT | Performed by: NURSE PRACTITIONER

## 2018-02-12 NOTE — PROGRESS NOTES
CC: Patient presents with:  Diabetes: follow up. pt has readings on his phone param.       HISTORY:  Past Medical History:   Diagnosis Date   • Bell's palsy    • Blood pressure elevated without history of HTN    • Essential hypertension    • Other and unspeci frequency denies   Hyperlipidemia: aversion to injections able to use with autoshield on abdomen, rotating sites. Hypertension:  Blood Pressure:   At goal    Medication: none  SE denies     Hyperlipidemia:  LDL:   Trigs [phone]     Medication: vascep BMI 33.05 kg/m²   Constitutional: Oriented to person, place, and time. No distress. HEENT: Normocephalic and atraumatic. Eyes: Conjunctivae are normal.   Neck: Normal range of motion. Neck supple.    Cardiovascular: Normal rate, regular rhythm and [place] daily - fasting, premeals and/or bedtime. Call/fax/email glucose logs in 30 days. Return in about 4 weeks (around 3/12/2018) for diabetes. Pt verbalized understanding and has no further questions at this time.     Evelyn SPEAR,CDE

## 2018-02-12 NOTE — PROGRESS NOTES
Diabetes Education:    Delroy's glucose control is better than in the past, but glucose still not to goal.  FBS > 130 mg/dl consistently. He is on 40 units Soliqua daily and Metformin 1000 mg BID.   Working on his diet: Trying to see how much carbohyd

## 2018-02-18 DIAGNOSIS — E11.65 UNCONTROLLED TYPE 2 DIABETES MELLITUS WITH MICROALBUMINURIA, UNSPECIFIED LONG TERM INSULIN USE STATUS: ICD-10-CM

## 2018-02-18 DIAGNOSIS — E11.29 UNCONTROLLED TYPE 2 DIABETES MELLITUS WITH MICROALBUMINURIA, UNSPECIFIED LONG TERM INSULIN USE STATUS: ICD-10-CM

## 2018-02-18 DIAGNOSIS — R80.9 UNCONTROLLED TYPE 2 DIABETES MELLITUS WITH MICROALBUMINURIA, UNSPECIFIED LONG TERM INSULIN USE STATUS: ICD-10-CM

## 2018-02-19 NOTE — TELEPHONE ENCOUNTER
Medication(s) to Refill:   Pending Prescriptions Disp Refills    Insulin Glargine-Lixisenatide (SOLIQUA) 100-33 UNT-MCG/ML Subcutaneous Solution Pen-injector 15 mL 1     Sig: Inject 60 Units into the skin daily.            Last Time Medication was Filled:

## 2018-02-19 NOTE — TELEPHONE ENCOUNTER
From: Aldair Israel  Sent: 2/18/2018 9:24 PM CST  Subject: Medication Renewal Request    Aldair Israel would like a refill of the following medications:     Insulin Glargine-Lixisenatide (SOLIQUA) 100-33 UNT-MCG/ML Subcutaneous Solution Pen-injecto

## 2018-03-12 ENCOUNTER — OFFICE VISIT (OUTPATIENT)
Dept: ENDOCRINOLOGY CLINIC | Facility: CLINIC | Age: 38
End: 2018-03-12

## 2018-03-12 VITALS
TEMPERATURE: 98 F | SYSTOLIC BLOOD PRESSURE: 108 MMHG | BODY MASS INDEX: 34.21 KG/M2 | WEIGHT: 218 LBS | HEART RATE: 72 BPM | DIASTOLIC BLOOD PRESSURE: 60 MMHG | HEIGHT: 67 IN | RESPIRATION RATE: 18 BRPM

## 2018-03-12 DIAGNOSIS — E11.29 UNCONTROLLED TYPE 2 DIABETES MELLITUS WITH MICROALBUMINURIA, UNSPECIFIED LONG TERM INSULIN USE STATUS: ICD-10-CM

## 2018-03-12 DIAGNOSIS — E11.65 UNCONTROLLED TYPE 2 DIABETES MELLITUS WITH MICROALBUMINURIA, UNSPECIFIED LONG TERM INSULIN USE STATUS: ICD-10-CM

## 2018-03-12 DIAGNOSIS — R80.9 UNCONTROLLED TYPE 2 DIABETES MELLITUS WITH MICROALBUMINURIA, UNSPECIFIED LONG TERM INSULIN USE STATUS: ICD-10-CM

## 2018-03-12 DIAGNOSIS — E78.1 PURE HYPERGLYCERIDEMIA: Primary | ICD-10-CM

## 2018-03-12 LAB
CARTRIDGE LOT#: 815 NUMERIC
HEMOGLOBIN A1C: 8.3 % (ref 4.3–5.6)

## 2018-03-12 PROCEDURE — 99213 OFFICE O/P EST LOW 20 MIN: CPT | Performed by: NURSE PRACTITIONER

## 2018-03-12 PROCEDURE — 83036 HEMOGLOBIN GLYCOSYLATED A1C: CPT | Performed by: NURSE PRACTITIONER

## 2018-03-12 NOTE — PROGRESS NOTES
CC: Patient presents with:  Diabetes: follow up. pt did bring readings on his phone param.       HISTORY:  Past Medical History:   Diagnosis Date   • Bell's palsy    • Blood pressure elevated without history of HTN    • Essential hypertension    • Other and u denies     Hyperlipidemia:  LDL: 117  Trigs 433     Medication: vascepa 2 gm bid  SE denies     ROS:   Constitutional: Negative for fever, chills and fatigue. No distress. Eyes: Negative for visual disturbance.  Last eye exam 10/2/17 with Carey Sousa motion. Neck supple. Cardiovascular: Normal rate, regular rhythm and intact distal pulses. No murmur, rubs or gallops. Pulmonary/Chest: Effort normal and breath sounds normal. No respiratory distress. No wheezes, rhonchi or rales   Abdominal: Soft.  [place]

## 2018-03-20 ENCOUNTER — TELEPHONE (OUTPATIENT)
Dept: ENDOCRINOLOGY CLINIC | Facility: CLINIC | Age: 38
End: 2018-03-20

## 2018-03-20 NOTE — TELEPHONE ENCOUNTER
Called [facility] to do a PA for bd needles auto shield and was not approved by insurance. Pt is aware he will pay cash for them.

## 2018-04-21 ENCOUNTER — HOSPITAL ENCOUNTER (EMERGENCY)
Age: 38
Discharge: HOME | End: 2018-04-21
Attending: EMERGENCY MEDICINE
Payer: COMMERCIAL

## 2018-04-21 VITALS
RESPIRATION RATE: 18 BRPM | BODY MASS INDEX: 34.72 KG/M2 | TEMPERATURE: 98 F | HEART RATE: 93 BPM | SYSTOLIC BLOOD PRESSURE: 125 MMHG | HEIGHT: 66 IN | WEIGHT: 216 LBS | OXYGEN SATURATION: 100 % | DIASTOLIC BLOOD PRESSURE: 92 MMHG

## 2018-04-21 DIAGNOSIS — G51.0 BELL'S PALSY: Primary | ICD-10-CM

## 2018-04-21 PROCEDURE — 93005 ELECTROCARDIOGRAM TRACING: CPT

## 2018-04-21 PROCEDURE — 93010 ELECTROCARDIOGRAM REPORT: CPT

## 2018-04-21 PROCEDURE — 99284 EMERGENCY DEPT VISIT MOD MDM: CPT

## 2018-04-21 PROCEDURE — 82962 GLUCOSE BLOOD TEST: CPT

## 2018-04-21 RX ORDER — PREDNISONE 10 MG/1
TABLET ORAL
Qty: 70 TABLET | Refills: 0 | Status: SHIPPED | OUTPATIENT
Start: 2018-04-21 | End: 2018-06-22

## 2018-04-21 RX ORDER — DIPHENHYDRAMINE HCL 25 MG
1 CAPSULE ORAL AS NEEDED
Qty: 30 EACH | Refills: 0 | Status: SHIPPED | OUTPATIENT
Start: 2018-04-21 | End: 2018-06-22

## 2018-04-21 RX ORDER — VALACYCLOVIR HYDROCHLORIDE 1 G/1
1 TABLET, FILM COATED ORAL 3 TIMES DAILY
Qty: 21 TABLET | Refills: 0 | Status: SHIPPED | OUTPATIENT
Start: 2018-04-21 | End: 2018-04-28

## 2018-04-21 NOTE — ED PROVIDER NOTES
Patient Seen in: [facility] Emergency Department In [place]    History   Patient presents with:  Numbness Weakness (neurologic)    Stated Complaint: numbness rt side of face x2days    HPI        Past Medical History:   Diagnosis Date   • Bell's palsy    • [id number]  [phone]    In 2 days          Medications Prescribed:  Current Discharge Medication List    The patient's case was discussed with me by physician's assistant Briana Mason.   I interviewed and examined the patient who has an isolated paresis of t

## 2018-04-21 NOTE — ED PROVIDER NOTES
Patient Seen in: [facility] In [place]    History   Patient presents with:  Numbness Weakness (neurologic)    Stated Complaint: numbness rt side of face x3ays    56-year-old  male with a history of hypertension, diabetes, Bel Wt 98 kg   SpO2 100%   BMI 34.86 kg/m²         Physical Exam   Constitutional: He is oriented to person, place, and time. He appears well-developed and well-nourished. HENT:   Head: Normocephalic and atraumatic.    Right Ear: External ear normal.   Left 1:45 pm    Follow-up:  Crystal Barron MD  [facility] [address] [address] [phone]    In 2 days          Medications Prescribed:  Current Discharge Medication List

## 2018-04-24 ENCOUNTER — TELEPHONE (OUTPATIENT)
Dept: NEUROLOGY | Facility: CLINIC | Age: 38
End: 2018-04-24

## 2018-04-25 PROCEDURE — 86334 IMMUNOFIX E-PHORESIS SERUM: CPT | Performed by: OTHER

## 2018-04-25 PROCEDURE — 84165 PROTEIN E-PHORESIS SERUM: CPT | Performed by: OTHER

## 2018-04-25 PROCEDURE — 84425 ASSAY OF VITAMIN B-1: CPT | Performed by: OTHER

## 2018-04-25 PROCEDURE — 82746 ASSAY OF FOLIC ACID SERUM: CPT | Performed by: OTHER

## 2018-04-25 PROCEDURE — 83883 ASSAY NEPHELOMETRY NOT SPEC: CPT | Performed by: OTHER

## 2018-04-25 PROCEDURE — 86618 LYME DISEASE ANTIBODY: CPT | Performed by: OTHER

## 2018-04-25 PROCEDURE — 82607 VITAMIN B-12: CPT | Performed by: OTHER

## 2018-04-25 PROCEDURE — 83921 ORGANIC ACID SINGLE QUANT: CPT | Performed by: OTHER

## 2018-05-05 ENCOUNTER — PATIENT MESSAGE (OUTPATIENT)
Dept: ENDOCRINOLOGY CLINIC | Facility: CLINIC | Age: 38
End: 2018-05-05

## 2018-05-07 NOTE — TELEPHONE ENCOUNTER
From: Tam Mcclellan  To: Mazin Barnes NP  Sent: 5/5/2018 10:28 AM CDT  Subject: Other    I have recently been diagnosed with Bell's palsy and have been given high doses of prednisone.  I noticed I have my blood work due with you soon, but my concern

## 2018-05-28 DIAGNOSIS — E11.29 UNCONTROLLED TYPE 2 DIABETES MELLITUS WITH MICROALBUMINURIA (HCC): ICD-10-CM

## 2018-05-28 DIAGNOSIS — R80.9 UNCONTROLLED TYPE 2 DIABETES MELLITUS WITH MICROALBUMINURIA (HCC): ICD-10-CM

## 2018-05-28 DIAGNOSIS — R03.0 BLOOD PRESSURE ELEVATED WITHOUT HISTORY OF HTN: ICD-10-CM

## 2018-05-28 DIAGNOSIS — E11.65 UNCONTROLLED TYPE 2 DIABETES MELLITUS WITH MICROALBUMINURIA (HCC): ICD-10-CM

## 2018-05-29 RX ORDER — INSULIN GLARGINE AND LIXISENATIDE 100; 33 U/ML; UG/ML
INJECTION, SOLUTION SUBCUTANEOUS
Qty: 15 ML | Refills: 0 | Status: SHIPPED | OUTPATIENT
Start: 2018-05-29 | End: 2018-06-20

## 2018-05-29 RX ORDER — LISINOPRIL 2.5 MG/1
TABLET ORAL
Qty: 90 TABLET | Refills: 0 | Status: SHIPPED | OUTPATIENT
Start: 2018-05-29 | End: 2018-06-22

## 2018-06-07 NOTE — TELEPHONE ENCOUNTER
Future Appointments  Date Time Provider Brina Claudia   6/22/2018 9:00 AM Lorena Lennon NP [facility]     FOR DM FU

## 2018-06-20 DIAGNOSIS — E11.29 UNCONTROLLED TYPE 2 DIABETES MELLITUS WITH MICROALBUMINURIA (HCC): ICD-10-CM

## 2018-06-20 DIAGNOSIS — E11.65 UNCONTROLLED TYPE 2 DIABETES MELLITUS WITH MICROALBUMINURIA (HCC): ICD-10-CM

## 2018-06-20 DIAGNOSIS — R80.9 UNCONTROLLED TYPE 2 DIABETES MELLITUS WITH MICROALBUMINURIA (HCC): ICD-10-CM

## 2018-06-20 RX ORDER — INSULIN GLARGINE AND LIXISENATIDE 100; 33 U/ML; UG/ML
INJECTION, SOLUTION SUBCUTANEOUS
Qty: 15 ML | Refills: 0 | Status: SHIPPED | OUTPATIENT
Start: 2018-06-20 | End: 2018-06-22

## 2018-06-21 ENCOUNTER — APPOINTMENT (OUTPATIENT)
Dept: LAB | Age: 38
End: 2018-06-21
Attending: NURSE PRACTITIONER
Payer: COMMERCIAL

## 2018-06-21 DIAGNOSIS — E55.9 VITAMIN D DEFICIENCY: ICD-10-CM

## 2018-06-21 DIAGNOSIS — E11.65 UNCONTROLLED TYPE 2 DIABETES MELLITUS WITH MICROALBUMINURIA (HCC): ICD-10-CM

## 2018-06-21 DIAGNOSIS — E11.29 UNCONTROLLED TYPE 2 DIABETES MELLITUS WITH MICROALBUMINURIA (HCC): ICD-10-CM

## 2018-06-21 DIAGNOSIS — R80.9 UNCONTROLLED TYPE 2 DIABETES MELLITUS WITH MICROALBUMINURIA (HCC): ICD-10-CM

## 2018-06-21 DIAGNOSIS — E11.9 DIABETES MELLITUS WITHOUT COMPLICATION (HCC): ICD-10-CM

## 2018-06-21 DIAGNOSIS — E78.1 PURE HYPERGLYCERIDEMIA: ICD-10-CM

## 2018-06-21 DIAGNOSIS — E78.1 HYPERTRIGLYCERIDEMIA: ICD-10-CM

## 2018-06-21 PROCEDURE — 80053 COMPREHEN METABOLIC PANEL: CPT | Performed by: NURSE PRACTITIONER

## 2018-06-21 PROCEDURE — 36415 COLL VENOUS BLD VENIPUNCTURE: CPT | Performed by: NURSE PRACTITIONER

## 2018-06-21 PROCEDURE — 80061 LIPID PANEL: CPT | Performed by: NURSE PRACTITIONER

## 2018-06-21 PROCEDURE — 82043 UR ALBUMIN QUANTITATIVE: CPT | Performed by: NURSE PRACTITIONER

## 2018-06-21 PROCEDURE — 82306 VITAMIN D 25 HYDROXY: CPT | Performed by: PHYSICIAN ASSISTANT

## 2018-06-21 PROCEDURE — 82570 ASSAY OF URINE CREATININE: CPT | Performed by: NURSE PRACTITIONER

## 2018-06-21 PROCEDURE — 83036 HEMOGLOBIN GLYCOSYLATED A1C: CPT | Performed by: NURSE PRACTITIONER

## 2018-06-22 ENCOUNTER — OFFICE VISIT (OUTPATIENT)
Dept: ENDOCRINOLOGY CLINIC | Facility: CLINIC | Age: 38
End: 2018-06-22

## 2018-06-22 VITALS
BODY MASS INDEX: 36.32 KG/M2 | HEART RATE: 78 BPM | RESPIRATION RATE: 18 BRPM | WEIGHT: 226 LBS | SYSTOLIC BLOOD PRESSURE: 98 MMHG | HEIGHT: 66 IN | DIASTOLIC BLOOD PRESSURE: 60 MMHG | TEMPERATURE: 98 F

## 2018-06-22 DIAGNOSIS — R80.9 UNCONTROLLED TYPE 2 DIABETES MELLITUS WITH MICROALBUMINURIA, WITH LONG-TERM CURRENT USE OF INSULIN (HCC): Primary | ICD-10-CM

## 2018-06-22 DIAGNOSIS — E11.65 UNCONTROLLED TYPE 2 DIABETES MELLITUS WITH MICROALBUMINURIA, WITH LONG-TERM CURRENT USE OF INSULIN (HCC): Primary | ICD-10-CM

## 2018-06-22 DIAGNOSIS — E78.5 HYPERLIPIDEMIA ASSOCIATED WITH TYPE 2 DIABETES MELLITUS (HCC): ICD-10-CM

## 2018-06-22 DIAGNOSIS — Z79.4 UNCONTROLLED TYPE 2 DIABETES MELLITUS WITH MICROALBUMINURIA, WITH LONG-TERM CURRENT USE OF INSULIN (HCC): Primary | ICD-10-CM

## 2018-06-22 DIAGNOSIS — E11.29 UNCONTROLLED TYPE 2 DIABETES MELLITUS WITH MICROALBUMINURIA, WITH LONG-TERM CURRENT USE OF INSULIN (HCC): Primary | ICD-10-CM

## 2018-06-22 DIAGNOSIS — E11.69 HYPERLIPIDEMIA ASSOCIATED WITH TYPE 2 DIABETES MELLITUS (HCC): ICD-10-CM

## 2018-06-22 DIAGNOSIS — E55.9 VITAMIN D DEFICIENCY: Primary | ICD-10-CM

## 2018-06-22 PROCEDURE — 99214 OFFICE O/P EST MOD 30 MIN: CPT | Performed by: NURSE PRACTITIONER

## 2018-06-22 RX ORDER — PRAVASTATIN SODIUM 10 MG
10 TABLET ORAL NIGHTLY
Qty: 30 TABLET | Refills: 2 | Status: SHIPPED | OUTPATIENT
Start: 2018-06-22 | End: 2018-10-15

## 2018-06-22 RX ORDER — ERGOCALCIFEROL 1.25 MG/1
50000 CAPSULE ORAL WEEKLY
Qty: 12 CAPSULE | Refills: 0 | Status: SHIPPED | OUTPATIENT
Start: 2018-06-22 | End: 2018-10-15

## 2018-06-22 NOTE — PROGRESS NOTES
CC: Patient presents with:  Diabetes: follow up. pt forgot meter.       HISTORY:  Past Medical History:   Diagnosis Date   • Bell's palsy    • Blood pressure elevated without history of HTN    • Essential hypertension    • Other and unspecified hyperlipidem At goal    Medication: none  SE denies     Hyperlipidemia:  LDL: 113   Medication: vascepa 2 gm bid  SE denies     ROS:   Constitutional: Negative for fever, chills and fatigue. No distress. Eyes: Negative for visual disturbance.  Last eye exam 10/2/17 w murmur, rubs or gallops. Pulmonary/Chest: Effort normal and breath sounds normal. No respiratory distress. No wheezes, rhonchi or rales   Abdominal: Soft. Bowel sounds are normal. Non tender, no masses. Skin: Skin is warm and dry. No rash noted.  No crow days.    Return in about 2 months (around 8/22/2018) for diabetes. Pt verbalized understanding and has no further questions at this time.     Evelyn SPEAR,JUANAE

## 2018-07-16 DIAGNOSIS — E11.29 UNCONTROLLED TYPE 2 DIABETES MELLITUS WITH MICROALBUMINURIA (HCC): ICD-10-CM

## 2018-07-16 DIAGNOSIS — R80.9 UNCONTROLLED TYPE 2 DIABETES MELLITUS WITH MICROALBUMINURIA (HCC): ICD-10-CM

## 2018-07-16 DIAGNOSIS — E11.65 UNCONTROLLED TYPE 2 DIABETES MELLITUS WITH MICROALBUMINURIA (HCC): ICD-10-CM

## 2018-07-17 RX ORDER — INSULIN GLARGINE AND LIXISENATIDE 100; 33 U/ML; UG/ML
INJECTION, SOLUTION SUBCUTANEOUS
Qty: 15 ML | Refills: 0 | OUTPATIENT
Start: 2018-07-17

## 2018-08-11 DIAGNOSIS — R80.9 UNCONTROLLED TYPE 2 DIABETES MELLITUS WITH MICROALBUMINURIA (HCC): ICD-10-CM

## 2018-08-11 DIAGNOSIS — E11.65 UNCONTROLLED TYPE 2 DIABETES MELLITUS WITH MICROALBUMINURIA (HCC): ICD-10-CM

## 2018-08-11 DIAGNOSIS — E11.29 UNCONTROLLED TYPE 2 DIABETES MELLITUS WITH MICROALBUMINURIA (HCC): ICD-10-CM

## 2018-08-13 RX ORDER — INSULIN GLARGINE AND LIXISENATIDE 100; 33 U/ML; UG/ML
INJECTION, SOLUTION SUBCUTANEOUS
Qty: 15 ML | Refills: 0 | Status: SHIPPED | OUTPATIENT
Start: 2018-08-13 | End: 2018-09-10

## 2018-08-13 NOTE — TELEPHONE ENCOUNTER
LOV-6/22/18 SC  FOV-none  LAST RX-6/22/18 15ml 2 refills  LAST LABS-6/21/18 8.4  PER PROTOCOL- to provider

## 2018-09-10 DIAGNOSIS — E11.29 UNCONTROLLED TYPE 2 DIABETES MELLITUS WITH MICROALBUMINURIA (HCC): ICD-10-CM

## 2018-09-10 DIAGNOSIS — E11.65 UNCONTROLLED TYPE 2 DIABETES MELLITUS WITH MICROALBUMINURIA (HCC): ICD-10-CM

## 2018-09-10 DIAGNOSIS — R80.9 UNCONTROLLED TYPE 2 DIABETES MELLITUS WITH MICROALBUMINURIA (HCC): ICD-10-CM

## 2018-09-11 ENCOUNTER — DIABETIC EDUCATION (OUTPATIENT)
Dept: ENDOCRINOLOGY CLINIC | Facility: CLINIC | Age: 38
End: 2018-09-11
Payer: COMMERCIAL

## 2018-09-11 ENCOUNTER — TELEPHONE (OUTPATIENT)
Dept: ENDOCRINOLOGY CLINIC | Facility: CLINIC | Age: 38
End: 2018-09-11

## 2018-09-11 ENCOUNTER — PATIENT MESSAGE (OUTPATIENT)
Dept: FAMILY MEDICINE CLINIC | Facility: CLINIC | Age: 38
End: 2018-09-11

## 2018-09-11 DIAGNOSIS — E11.65 TYPE 2 DIABETES MELLITUS WITH HYPERGLYCEMIA, WITH LONG-TERM CURRENT USE OF INSULIN (HCC): Primary | ICD-10-CM

## 2018-09-11 DIAGNOSIS — Z79.4 TYPE 2 DIABETES MELLITUS WITH HYPERGLYCEMIA, WITH LONG-TERM CURRENT USE OF INSULIN (HCC): Primary | ICD-10-CM

## 2018-09-11 PROCEDURE — G0108 DIAB MANAGE TRN  PER INDIV: HCPCS | Performed by: DIETITIAN, REGISTERED

## 2018-09-11 RX ORDER — FLASH GLUCOSE SENSOR
1 KIT MISCELLANEOUS ONCE
Qty: 1 DEVICE | Refills: 0 | Status: CANCELLED | OUTPATIENT
Start: 2018-09-11 | End: 2018-09-11

## 2018-09-11 RX ORDER — FLASH GLUCOSE SENSOR
3 KIT MISCELLANEOUS
Qty: 3 EACH | Refills: 3 | Status: CANCELLED | OUTPATIENT
Start: 2018-09-11

## 2018-09-11 NOTE — PROGRESS NOTES
Start time: 9:45  End time: 10:15    Failed DB meds: took himself off [place] 1 month ago, mycotic in spite of 48 oz water daily    A1C/Weight: 8.4% 6/21/18, to have labs drawn again soon    Diet: [address] [facility] to [employer] goes to 4 [address]

## 2018-09-12 ENCOUNTER — PATIENT MESSAGE (OUTPATIENT)
Dept: FAMILY MEDICINE CLINIC | Facility: CLINIC | Age: 38
End: 2018-09-12

## 2018-09-12 NOTE — TELEPHONE ENCOUNTER
DQK-7/56/67 SC  FOV-none  LAST RX-6/22/18 15ml 2 refills  LAST LABS-a1c- 6/21/18 8.4  PER PROTOCOL-to provider

## 2018-09-12 NOTE — TELEPHONE ENCOUNTER
From: Melvin Rodriguez  To: Zulay Arango MD  Sent: 9/11/2018 6:41 PM CDT  Subject: Prescription Question    I have a question. My diabetes person is gone and I won't be able to see someone I'll December.  I would like to see if I can have the freestyle li

## 2018-09-13 NOTE — TELEPHONE ENCOUNTER
From: Naomi Justin  To: Kristen Pandya MD  Sent: 9/12/2018 1:04 PM CDT  Subject: Prescription Question    I contacted the diabetes center about writing a script for the freestyle yvonne. They won't be able to until October.  Is there any way I can have y

## 2018-09-13 NOTE — TELEPHONE ENCOUNTER
Patient was being seen by the diabetic clinic by [facility]. [facility] is now gone and patient has an appointment in October with a new provider there.   However, patient is requesting the freestyle yvonne and they will not order this for patient until his appointme

## 2018-09-17 RX ORDER — INSULIN GLARGINE AND LIXISENATIDE 100; 33 U/ML; UG/ML
INJECTION, SOLUTION SUBCUTANEOUS
Qty: 15 ML | Refills: 0 | Status: SHIPPED | OUTPATIENT
Start: 2018-09-17 | End: 2018-10-15

## 2018-09-17 NOTE — TELEPHONE ENCOUNTER
Pt is calling to check on the status re: his refill for   SOLIQUA 100-33 UNT-MCG/ML Subcutaneous Solution Pen-injector 15 mL 0 8/13/2018    Sig :  INJECT 60 UNITS UNDER THE SKIN DAILY

## 2018-10-01 ENCOUNTER — TELEPHONE (OUTPATIENT)
Dept: ENDOCRINOLOGY CLINIC | Facility: CLINIC | Age: 38
End: 2018-10-01

## 2018-10-01 DIAGNOSIS — E11.29 TYPE 2 DIABETES MELLITUS WITH MICROALBUMINURIA, WITHOUT LONG-TERM CURRENT USE OF INSULIN (HCC): Primary | ICD-10-CM

## 2018-10-01 DIAGNOSIS — R80.9 TYPE 2 DIABETES MELLITUS WITH MICROALBUMINURIA, WITHOUT LONG-TERM CURRENT USE OF INSULIN (HCC): Primary | ICD-10-CM

## 2018-10-09 NOTE — TELEPHONE ENCOUNTER
Pt would like to know of the status on the [place]. He was told he was on a waiting list for an appt with the dm clinic.  Please advise

## 2018-10-11 RX ORDER — PEN NEEDLE, DIABETIC, SAFETY 30 GX3/16"
NEEDLE, DISPOSABLE MISCELLANEOUS
Qty: 100 EACH | Refills: 0 | Status: SHIPPED | OUTPATIENT
Start: 2018-10-11 | End: 2020-03-06

## 2018-10-15 ENCOUNTER — OFFICE VISIT (OUTPATIENT)
Dept: ENDOCRINOLOGY CLINIC | Facility: CLINIC | Age: 38
End: 2018-10-15
Payer: COMMERCIAL

## 2018-10-15 VITALS
HEART RATE: 77 BPM | TEMPERATURE: 99 F | HEIGHT: 67 IN | WEIGHT: 222 LBS | BODY MASS INDEX: 34.84 KG/M2 | DIASTOLIC BLOOD PRESSURE: 82 MMHG | RESPIRATION RATE: 20 BRPM | SYSTOLIC BLOOD PRESSURE: 110 MMHG

## 2018-10-15 DIAGNOSIS — E11.65 UNCONTROLLED TYPE 2 DIABETES MELLITUS WITH HYPERGLYCEMIA (HCC): Primary | ICD-10-CM

## 2018-10-15 PROCEDURE — 83036 HEMOGLOBIN GLYCOSYLATED A1C: CPT | Performed by: NURSE PRACTITIONER

## 2018-10-15 PROCEDURE — 99214 OFFICE O/P EST MOD 30 MIN: CPT | Performed by: NURSE PRACTITIONER

## 2018-10-15 RX ORDER — FLASH GLUCOSE SENSOR
3 KIT MISCELLANEOUS
Qty: 3 EACH | Refills: 5 | Status: SHIPPED | OUTPATIENT
Start: 2018-10-15 | End: 2018-11-14

## 2018-10-15 RX ORDER — FLASH GLUCOSE SCANNING READER
1 EACH MISCELLANEOUS ONCE
Qty: 1 DEVICE | Refills: 0 | Status: SHIPPED | OUTPATIENT
Start: 2018-10-15 | End: 2018-10-15

## 2018-10-15 RX ORDER — PIOGLITAZONEHYDROCHLORIDE 15 MG/1
15 TABLET ORAL DAILY
Qty: 30 TABLET | Refills: 2 | Status: SHIPPED | OUTPATIENT
Start: 2018-10-15 | End: 2019-02-06 | Stop reason: CLARIF

## 2018-10-15 RX ORDER — DAPAGLIFLOZIN 10 MG/1
TABLET, FILM COATED ORAL
Refills: 2 | COMMUNITY
Start: 2018-10-09 | End: 2018-12-21

## 2018-10-15 NOTE — PATIENT INSTRUCTIONS
Continue current medications as prescribed. Reviewed the risk of and benefits of SGLT2 inhibitor class for treatment of type 2 Diabetes. Patient was instructed to drink at least 6 eight ounce glasses of water daily to avoid dehydration.   Begin using the F

## 2018-10-15 NOTE — PROGRESS NOTES
Patient presents with:  Diabetes: follow up. pt forgot meter. HPI:   Paul Marin is a 41year old male who presents for recheck of his type 2 diabetes. Duration: 7 years.   Patient states that he stopped [place] approximately 2.5 weeks ago because Immunizations    Last refreshed: 10/15/2018 10:23 AM:  Influenza vaccination Declined     Patient receiving at work      Last refreshed: 10/15/2018 10:23 AM:  Pneumonia vaccination Not on file              Past Visit Information    Last refreshed: 10/15/20 He  reports that  has never smoked. he has never used smokeless tobacco. He reports that he drinks alcohol. He reports that he does not use drugs.      He is allergic to allergy; grass; mold; pollen; and seasonal.     Current Outpatient Medications on File Recommendations are: continue present meds, lose weight by increased dietary compliance and exercise, see ophthalmology soon, check feet daily, will add  pioglitazone at a dose of 15mg po daily and will check labs as ordered.   Patient agreeable to begin co

## 2018-10-15 NOTE — PROGRESS NOTES
Recommendation: watch calories as well as carbs. Failed DB meds: had difficulty with farxiga/mycotic, also drives in car long distances and knows needs to increase water intake but willing to try again    A1C/Weight: 9.3% today up from 8.4% in June.  Wa

## 2018-10-21 DIAGNOSIS — R80.9 UNCONTROLLED TYPE 2 DIABETES MELLITUS WITH MICROALBUMINURIA, WITH LONG-TERM CURRENT USE OF INSULIN (HCC): ICD-10-CM

## 2018-10-21 DIAGNOSIS — E11.29 UNCONTROLLED TYPE 2 DIABETES MELLITUS WITH MICROALBUMINURIA (HCC): ICD-10-CM

## 2018-10-21 DIAGNOSIS — E11.29 UNCONTROLLED TYPE 2 DIABETES MELLITUS WITH MICROALBUMINURIA, WITH LONG-TERM CURRENT USE OF INSULIN (HCC): ICD-10-CM

## 2018-10-21 DIAGNOSIS — Z79.4 UNCONTROLLED TYPE 2 DIABETES MELLITUS WITH MICROALBUMINURIA, WITH LONG-TERM CURRENT USE OF INSULIN (HCC): ICD-10-CM

## 2018-10-21 DIAGNOSIS — E11.65 UNCONTROLLED TYPE 2 DIABETES MELLITUS WITH MICROALBUMINURIA, WITH LONG-TERM CURRENT USE OF INSULIN (HCC): ICD-10-CM

## 2018-10-21 DIAGNOSIS — R80.9 UNCONTROLLED TYPE 2 DIABETES MELLITUS WITH MICROALBUMINURIA (HCC): ICD-10-CM

## 2018-10-21 DIAGNOSIS — E11.65 UNCONTROLLED TYPE 2 DIABETES MELLITUS WITH MICROALBUMINURIA (HCC): ICD-10-CM

## 2018-10-24 RX ORDER — INSULIN GLARGINE AND LIXISENATIDE 100; 33 U/ML; UG/ML
INJECTION, SOLUTION SUBCUTANEOUS
Qty: 15 ML | Refills: 2 | Status: SHIPPED | OUTPATIENT
Start: 2018-10-24 | End: 2018-11-14

## 2018-10-26 ENCOUNTER — OFFICE VISIT (OUTPATIENT)
Dept: FAMILY MEDICINE CLINIC | Facility: CLINIC | Age: 38
End: 2018-10-26
Payer: COMMERCIAL

## 2018-10-26 VITALS
HEIGHT: 67 IN | OXYGEN SATURATION: 98 % | HEART RATE: 74 BPM | TEMPERATURE: 98 F | BODY MASS INDEX: 35.31 KG/M2 | SYSTOLIC BLOOD PRESSURE: 114 MMHG | DIASTOLIC BLOOD PRESSURE: 80 MMHG | WEIGHT: 225 LBS | RESPIRATION RATE: 16 BRPM

## 2018-10-26 DIAGNOSIS — J06.9 VIRAL URI WITH COUGH: Primary | ICD-10-CM

## 2018-10-26 PROCEDURE — 99213 OFFICE O/P EST LOW 20 MIN: CPT | Performed by: NURSE PRACTITIONER

## 2018-10-26 RX ORDER — BENZONATATE 200 MG/1
200 CAPSULE ORAL 3 TIMES DAILY PRN
Qty: 20 CAPSULE | Refills: 0 | Status: SHIPPED | OUTPATIENT
Start: 2018-10-26 | End: 2018-11-02

## 2018-10-26 RX ORDER — ALBUTEROL SULFATE 90 UG/1
2 AEROSOL, METERED RESPIRATORY (INHALATION) EVERY 4 HOURS PRN
Qty: 1 INHALER | Refills: 0 | Status: SHIPPED | OUTPATIENT
Start: 2018-10-26 | End: 2018-11-14

## 2018-10-26 NOTE — PROGRESS NOTES
CHIEF COMPLAINT:   Patient presents with:  Cough: congestion x 3 days, tightness in chest, Left ear      HPI:   Markus Marquez is a 41year old male who presents for upper respiratory symptoms for  3 days.  Patient reports cough, feels head and chest con Past Surgical History:   Procedure Laterality Date   • KNEE ARTHROSCP HARV  6/11    Rt.  Knee         Social History    Tobacco Use      Smoking status: Never Smoker      Smokeless tobacco: Never Used    Alcohol use: Yes      Comment: 2-3 beers a week     Sig: Inhale 2 puffs into the lungs every 4 (four) hours as needed for Wheezing. • benzonatate 200 MG Oral Cap 20 capsule 0     Sig: Take 1 capsule (200 mg total) by mouth 3 (three) times daily as needed for cough.      Risks, benefits, and side effects · Your appetite may be poor, so a light diet is fine. Avoid dehydration by drinking 6 to 8 glasses of fluids per day (water, soft drinks, juices, tea, or soup). Extra fluids will help loosen secretions in the nose and lungs.   · Over-the-counter cold medici

## 2018-11-05 ENCOUNTER — APPOINTMENT (OUTPATIENT)
Dept: LAB | Age: 38
End: 2018-11-05
Attending: NURSE PRACTITIONER
Payer: COMMERCIAL

## 2018-11-05 DIAGNOSIS — E11.69 HYPERLIPIDEMIA ASSOCIATED WITH TYPE 2 DIABETES MELLITUS (HCC): ICD-10-CM

## 2018-11-05 DIAGNOSIS — Z79.4 UNCONTROLLED TYPE 2 DIABETES MELLITUS WITH MICROALBUMINURIA, WITH LONG-TERM CURRENT USE OF INSULIN (HCC): ICD-10-CM

## 2018-11-05 DIAGNOSIS — E11.29 UNCONTROLLED TYPE 2 DIABETES MELLITUS WITH MICROALBUMINURIA, WITH LONG-TERM CURRENT USE OF INSULIN (HCC): ICD-10-CM

## 2018-11-05 DIAGNOSIS — E78.5 HYPERLIPIDEMIA ASSOCIATED WITH TYPE 2 DIABETES MELLITUS (HCC): ICD-10-CM

## 2018-11-05 DIAGNOSIS — E11.65 UNCONTROLLED TYPE 2 DIABETES MELLITUS WITH MICROALBUMINURIA, WITH LONG-TERM CURRENT USE OF INSULIN (HCC): ICD-10-CM

## 2018-11-05 DIAGNOSIS — E55.9 VITAMIN D DEFICIENCY: ICD-10-CM

## 2018-11-05 DIAGNOSIS — R80.9 UNCONTROLLED TYPE 2 DIABETES MELLITUS WITH MICROALBUMINURIA, WITH LONG-TERM CURRENT USE OF INSULIN (HCC): ICD-10-CM

## 2018-11-05 PROCEDURE — 82306 VITAMIN D 25 HYDROXY: CPT | Performed by: FAMILY MEDICINE

## 2018-11-05 PROCEDURE — 80053 COMPREHEN METABOLIC PANEL: CPT | Performed by: NURSE PRACTITIONER

## 2018-11-05 PROCEDURE — 36415 COLL VENOUS BLD VENIPUNCTURE: CPT | Performed by: NURSE PRACTITIONER

## 2018-11-05 PROCEDURE — 83036 HEMOGLOBIN GLYCOSYLATED A1C: CPT | Performed by: NURSE PRACTITIONER

## 2018-11-05 PROCEDURE — 80061 LIPID PANEL: CPT | Performed by: NURSE PRACTITIONER

## 2018-11-06 DIAGNOSIS — E55.9 VITAMIN D DEFICIENCY: Primary | ICD-10-CM

## 2018-11-07 NOTE — TELEPHONE ENCOUNTER
LOV-10/15/18 Cinthia  FOV-11/2/18 Cinthia  LAST RX-10/09/18 30 tabs 0 refills  LAST LABS-11/05/18 a1c-9.1  PER PROTOCOL-to provider

## 2018-11-08 RX ORDER — DAPAGLIFLOZIN 10 MG/1
TABLET, FILM COATED ORAL
Qty: 30 TABLET | Refills: 0 | Status: SHIPPED | OUTPATIENT
Start: 2018-11-08 | End: 2018-11-14

## 2018-11-12 ENCOUNTER — TELEPHONE (OUTPATIENT)
Dept: INTERNAL MEDICINE CLINIC | Facility: CLINIC | Age: 38
End: 2018-11-12

## 2018-11-12 NOTE — TELEPHONE ENCOUNTER
DH, please see labs from 11/5/18 for your review. Pt has rescheduled for 11/14/18 at 1:30 with you.   BAYLEE

## 2018-11-12 NOTE — TELEPHONE ENCOUNTER
Franko Anderson - FW: Appointment canceled More Detail >>   FW: Appointment canceled   Brandi Santana   MRN: [id number] : 1980   Pt Home: [phone]     Entered: [phone]        Sent: Shanda Current 2018  8:31 AM

## 2018-11-14 ENCOUNTER — OFFICE VISIT (OUTPATIENT)
Dept: ENDOCRINOLOGY CLINIC | Facility: CLINIC | Age: 38
End: 2018-11-14
Payer: COMMERCIAL

## 2018-11-14 VITALS
BODY MASS INDEX: 35.63 KG/M2 | HEIGHT: 67 IN | RESPIRATION RATE: 16 BRPM | WEIGHT: 227 LBS | SYSTOLIC BLOOD PRESSURE: 122 MMHG | HEART RATE: 68 BPM | DIASTOLIC BLOOD PRESSURE: 74 MMHG

## 2018-11-14 DIAGNOSIS — E11.29 UNCONTROLLED TYPE 2 DIABETES MELLITUS WITH MICROALBUMINURIA (HCC): Primary | ICD-10-CM

## 2018-11-14 DIAGNOSIS — Z23 NEED FOR INFLUENZA VACCINATION: ICD-10-CM

## 2018-11-14 DIAGNOSIS — E11.65 UNCONTROLLED TYPE 2 DIABETES MELLITUS WITH MICROALBUMINURIA (HCC): Primary | ICD-10-CM

## 2018-11-14 DIAGNOSIS — R80.9 UNCONTROLLED TYPE 2 DIABETES MELLITUS WITH MICROALBUMINURIA (HCC): Primary | ICD-10-CM

## 2018-11-14 PROCEDURE — 90686 IIV4 VACC NO PRSV 0.5 ML IM: CPT | Performed by: NURSE PRACTITIONER

## 2018-11-14 PROCEDURE — 95249 CONT GLUC MNTR PT PROV EQP: CPT | Performed by: NURSE PRACTITIONER

## 2018-11-14 PROCEDURE — 90471 IMMUNIZATION ADMIN: CPT | Performed by: NURSE PRACTITIONER

## 2018-11-14 PROCEDURE — 99214 OFFICE O/P EST MOD 30 MIN: CPT | Performed by: NURSE PRACTITIONER

## 2018-11-14 RX ORDER — LISINOPRIL 5 MG/1
5 TABLET ORAL DAILY
Qty: 30 TABLET | Refills: 2 | Status: SHIPPED | OUTPATIENT
Start: 2018-11-14 | End: 2019-05-08

## 2018-11-14 RX ORDER — FLASH GLUCOSE SENSOR
1 KIT MISCELLANEOUS
Qty: 2 EACH | Refills: 3 | Status: SHIPPED | OUTPATIENT
Start: 2018-11-14 | End: 2018-11-14

## 2018-11-14 RX ORDER — FLASH GLUCOSE SENSOR
1 KIT MISCELLANEOUS
Qty: 2 EACH | Refills: 3 | Status: SHIPPED | OUTPATIENT
Start: 2018-11-14 | End: 2019-07-04

## 2018-11-14 NOTE — PROGRESS NOTES
HPI:   Roque Love is a 41year old male who presents for 1 month recheck of his type 2 diabetes. Patient has begun using a personal Freestyle Angela continuous glucose monitor for the last two weeks.   Patient feels that since his last appointment he TSH 0.951 02/15/2017 09:56 AM    BUN 18 11/05/2018 07:37 AM    CREATSERUM 0.73 11/05/2018 07:37 AM     02/02/2018 11:32 AM         Wt Readings from Last 3 Encounters:  11/14/18 : 227 lb  10/26/18 : 225 lb  10/15/18 : 222 lb    BP Readings from Last rashes  RESPIRATORY: denies shortness of breath with exertion  CARDIOVASCULAR: denies chest pain on exertion  GI: denies abdominal pain and denies heartburn  NEURO: denies headaches and denies numbness and tingling to extremities    EXAM:   /74 (BP L Misc; 1 each by Does not apply route every 14 (fourteen) days. -     Continuous Blood Gluc Sensor (FREESTYLE DANILO 14 DAY SENSOR) Does not apply Misc; 1 each by Does not apply route every 14 (fourteen) days.     Need for influenza vaccination  -     FLULAV

## 2018-11-14 NOTE — PATIENT INSTRUCTIONS
We are here to support you with Diabetes but please remember that you still need your primary care doctor for your routine health maintenance. Your A1C: 9.1%  This test provides us with your average blood sugar for the past 3 months.    The main goal of d important for foot care. Any unusual changes should be reported immediately.   3. EYE exams: Checking your eyes for diabetes changes is important and you should have a dilated eye exam done by an eye doctor EVERY year since these changes occur in the BACK o

## 2018-12-01 ENCOUNTER — PATIENT MESSAGE (OUTPATIENT)
Dept: ENDOCRINOLOGY CLINIC | Facility: CLINIC | Age: 38
End: 2018-12-01

## 2018-12-03 NOTE — TELEPHONE ENCOUNTER
Regarding: FW: Visit Follow-up Question  Contact: [phone]      ----- Message -----  From: Johnie Adam  Sent: 12/1/2018   7:42 PM  To: [facility] Clinical Staff  Subject: Visit Follow-up Question                         ----- Message from Generic, My

## 2018-12-03 NOTE — PROGRESS NOTES
Called patient, discussed current symptoms. Patient c/o congestion and sinus headaches. Patient denies hypoglycemia or hypotension. Discontinued piolgitazone at this time. Patient to call with update of symptoms in 2-3 days.   Instructed patient to [facility]

## 2018-12-06 ENCOUNTER — OFFICE VISIT (OUTPATIENT)
Dept: FAMILY MEDICINE CLINIC | Facility: CLINIC | Age: 38
End: 2018-12-06
Payer: COMMERCIAL

## 2018-12-06 VITALS
TEMPERATURE: 98 F | OXYGEN SATURATION: 98 % | HEIGHT: 67 IN | DIASTOLIC BLOOD PRESSURE: 68 MMHG | RESPIRATION RATE: 18 BRPM | HEART RATE: 78 BPM | SYSTOLIC BLOOD PRESSURE: 124 MMHG | WEIGHT: 223 LBS | BODY MASS INDEX: 35 KG/M2

## 2018-12-06 DIAGNOSIS — J01.00 ACUTE NON-RECURRENT MAXILLARY SINUSITIS: Primary | ICD-10-CM

## 2018-12-06 PROCEDURE — 99213 OFFICE O/P EST LOW 20 MIN: CPT | Performed by: FAMILY MEDICINE

## 2018-12-06 RX ORDER — AZITHROMYCIN 250 MG/1
TABLET, FILM COATED ORAL
Qty: 6 TABLET | Refills: 0 | Status: SHIPPED | OUTPATIENT
Start: 2018-12-06 | End: 2018-12-14 | Stop reason: ALTCHOICE

## 2018-12-06 NOTE — PROGRESS NOTES
Mehnaz Payne is a 41year old male. Patient presents with:  Sinus Problem: almost a week.  zyrtec flonase claritin   Headache: today    HPI:    Symptoms started  7  days ago with purulent nasal discharge, maxillary sinus pressure, and headache, a l wheezing   CARDIOVASCULAR: no chest pain or SOB. GI: denies nausea, vomiting.   PSYCHE: no symptoms of depression or anxiety      PE:  /68   Pulse 78   Temp 98.1 °F (36.7 °C) (Oral)   Resp 18   Ht 67\"   Wt 223 lb   SpO2 98%   BMI 34.93 kg/m²   Gener

## 2018-12-09 ENCOUNTER — HOSPITAL ENCOUNTER (EMERGENCY)
Age: 38
Discharge: HOME | End: 2018-12-09
Attending: EMERGENCY MEDICINE
Payer: COMMERCIAL

## 2018-12-09 ENCOUNTER — APPOINTMENT (OUTPATIENT)
Dept: CT IMAGING | Age: 38
End: 2018-12-09
Attending: EMERGENCY MEDICINE
Payer: COMMERCIAL

## 2018-12-09 VITALS
DIASTOLIC BLOOD PRESSURE: 76 MMHG | HEIGHT: 66 IN | HEART RATE: 80 BPM | RESPIRATION RATE: 19 BRPM | BODY MASS INDEX: 36.16 KG/M2 | OXYGEN SATURATION: 99 % | SYSTOLIC BLOOD PRESSURE: 134 MMHG | TEMPERATURE: 98 F | WEIGHT: 225 LBS

## 2018-12-09 DIAGNOSIS — R51.9 HEADACHE AROUND THE EYES: Primary | ICD-10-CM

## 2018-12-09 PROCEDURE — 70450 CT HEAD/BRAIN W/O DYE: CPT | Performed by: EMERGENCY MEDICINE

## 2018-12-09 PROCEDURE — 96361 HYDRATE IV INFUSION ADD-ON: CPT

## 2018-12-09 PROCEDURE — 85025 COMPLETE CBC W/AUTO DIFF WBC: CPT | Performed by: EMERGENCY MEDICINE

## 2018-12-09 PROCEDURE — 99284 EMERGENCY DEPT VISIT MOD MDM: CPT

## 2018-12-09 PROCEDURE — 99285 EMERGENCY DEPT VISIT HI MDM: CPT

## 2018-12-09 PROCEDURE — 96375 TX/PRO/DX INJ NEW DRUG ADDON: CPT

## 2018-12-09 PROCEDURE — 80053 COMPREHEN METABOLIC PANEL: CPT | Performed by: EMERGENCY MEDICINE

## 2018-12-09 PROCEDURE — 96374 THER/PROPH/DIAG INJ IV PUSH: CPT

## 2018-12-09 RX ORDER — KETOROLAC TROMETHAMINE 30 MG/ML
30 INJECTION, SOLUTION INTRAMUSCULAR; INTRAVENOUS ONCE
Status: COMPLETED | OUTPATIENT
Start: 2018-12-09 | End: 2018-12-09

## 2018-12-09 RX ORDER — DIPHENHYDRAMINE HYDROCHLORIDE 50 MG/ML
25 INJECTION INTRAMUSCULAR; INTRAVENOUS ONCE
Status: COMPLETED | OUTPATIENT
Start: 2018-12-09 | End: 2018-12-09

## 2018-12-09 RX ORDER — MORPHINE SULFATE 4 MG/ML
4 INJECTION, SOLUTION INTRAMUSCULAR; INTRAVENOUS ONCE
Status: COMPLETED | OUTPATIENT
Start: 2018-12-09 | End: 2018-12-09

## 2018-12-09 RX ORDER — METOCLOPRAMIDE HYDROCHLORIDE 5 MG/ML
10 INJECTION INTRAMUSCULAR; INTRAVENOUS ONCE
Status: COMPLETED | OUTPATIENT
Start: 2018-12-09 | End: 2018-12-09

## 2018-12-09 RX ORDER — DEXAMETHASONE SODIUM PHOSPHATE 4 MG/ML
10 VIAL (ML) INJECTION ONCE
Status: COMPLETED | OUTPATIENT
Start: 2018-12-09 | End: 2018-12-09

## 2018-12-09 RX ORDER — KETOROLAC TROMETHAMINE 10 MG/1
10 TABLET, FILM COATED ORAL EVERY 6 HOURS PRN
Qty: 30 TABLET | Refills: 0 | Status: SHIPPED | OUTPATIENT
Start: 2018-12-09 | End: 2018-12-16

## 2018-12-09 NOTE — ED PROVIDER NOTES
Patient Seen in: [facility] [place]    History   Patient presents with:  Headache (neurologic)    Stated Complaint: headache, nausea, sensitive to light    HPI    16-year-old male presents for evaluation of headache.   Patient has h Alert, oriented, no apparent distress  HEENT: Atraumatic, normocephalic. Pupils equal reactive. Extraocular motions intact. Neck: Supple, no meningismus  Lungs: Clear to auscultation bilaterally. Heart: Regular rate and rhythm.   Abdomen: Soft, nontend size.   INTRACRANIAL:  There are no abnormal extraaxial fluid collections. There is no midline shift. There are no intraparenchymal brain abnormalities. There is no sign of acute territorial infarction. There is no hemorrhage or mass lesion.    SINUSES: to ER recommended if worsening or changing symptoms arise prior to follow-up      Disposition and Plan     Clinical Impression:  Headache around the eyes  (primary encounter diagnosis)    Disposition:  Discharge  12/9/2018  6:20 pm    Follow-up:  Anderson Barry

## 2018-12-09 NOTE — ED INITIAL ASSESSMENT (HPI)
Pt states has sinus infection x2 weeks that he is being treated for with abx.  Pt states that the sinus pressure has not releaved

## 2018-12-11 ENCOUNTER — TELEPHONE (OUTPATIENT)
Dept: FAMILY MEDICINE CLINIC | Facility: CLINIC | Age: 38
End: 2018-12-11

## 2018-12-11 NOTE — TELEPHONE ENCOUNTER
lvm for pt to call back to schedule and ER f/u appt with Dr. Ciro Champagne for headache, nausea, and light sensitivity

## 2018-12-14 PROBLEM — G43.809 OTHER MIGRAINE WITHOUT STATUS MIGRAINOSUS, NOT INTRACTABLE: Status: ACTIVE | Noted: 2018-12-14

## 2018-12-21 ENCOUNTER — TELEPHONE (OUTPATIENT)
Dept: INTERNAL MEDICINE CLINIC | Facility: CLINIC | Age: 38
End: 2018-12-21

## 2018-12-21 RX ORDER — DAPAGLIFLOZIN 10 MG/1
TABLET, FILM COATED ORAL
Qty: 30 TABLET | Refills: 2 | Status: SHIPPED | OUTPATIENT
Start: 2018-12-21 | End: 2019-05-08

## 2019-01-25 ENCOUNTER — TELEPHONE (OUTPATIENT)
Dept: ENDOCRINOLOGY CLINIC | Facility: CLINIC | Age: 39
End: 2019-01-25

## 2019-01-25 DIAGNOSIS — Z79.4 TYPE 2 DIABETES MELLITUS WITH HYPERGLYCEMIA, WITH LONG-TERM CURRENT USE OF INSULIN (HCC): ICD-10-CM

## 2019-01-25 DIAGNOSIS — E11.65 TYPE 2 DIABETES MELLITUS WITH HYPERGLYCEMIA, WITH LONG-TERM CURRENT USE OF INSULIN (HCC): ICD-10-CM

## 2019-01-25 NOTE — TELEPHONE ENCOUNTER
Dilated Diabetic Eye exam abstracted and placed on [facility] desk for signature before sending to scan.

## 2019-01-28 NOTE — TELEPHONE ENCOUNTER
LOV-11/14/18 DH  FOV-none  LAST RX-9/11/18 180 tabs 0 refill  LAST LABS-11/05/18 a1c-9.1  PER PROTOCOL-to provider

## 2019-01-29 DIAGNOSIS — Z79.4 UNCONTROLLED TYPE 2 DIABETES MELLITUS WITH MICROALBUMINURIA, WITH LONG-TERM CURRENT USE OF INSULIN (HCC): ICD-10-CM

## 2019-01-29 DIAGNOSIS — R80.9 UNCONTROLLED TYPE 2 DIABETES MELLITUS WITH MICROALBUMINURIA, WITH LONG-TERM CURRENT USE OF INSULIN (HCC): ICD-10-CM

## 2019-01-29 DIAGNOSIS — E11.29 UNCONTROLLED TYPE 2 DIABETES MELLITUS WITH MICROALBUMINURIA, WITH LONG-TERM CURRENT USE OF INSULIN (HCC): ICD-10-CM

## 2019-01-29 DIAGNOSIS — E11.65 UNCONTROLLED TYPE 2 DIABETES MELLITUS WITH MICROALBUMINURIA, WITH LONG-TERM CURRENT USE OF INSULIN (HCC): ICD-10-CM

## 2019-01-29 NOTE — TELEPHONE ENCOUNTER
LOV-11/14/18 DH  FOV-none  LAST RX-6/22/18 15 ml 2 refill  LAST LABS-11/05/18 a1c- 9.1  PER PROTOCOL-to provider

## 2019-01-31 DIAGNOSIS — E11.29 UNCONTROLLED TYPE 2 DIABETES MELLITUS WITH MICROALBUMINURIA, WITH LONG-TERM CURRENT USE OF INSULIN (HCC): ICD-10-CM

## 2019-01-31 DIAGNOSIS — R80.9 UNCONTROLLED TYPE 2 DIABETES MELLITUS WITH MICROALBUMINURIA, WITH LONG-TERM CURRENT USE OF INSULIN (HCC): ICD-10-CM

## 2019-01-31 DIAGNOSIS — Z79.4 UNCONTROLLED TYPE 2 DIABETES MELLITUS WITH MICROALBUMINURIA, WITH LONG-TERM CURRENT USE OF INSULIN (HCC): ICD-10-CM

## 2019-01-31 DIAGNOSIS — E11.65 UNCONTROLLED TYPE 2 DIABETES MELLITUS WITH MICROALBUMINURIA, WITH LONG-TERM CURRENT USE OF INSULIN (HCC): ICD-10-CM

## 2019-02-02 ENCOUNTER — TELEPHONE (OUTPATIENT)
Dept: ENDOCRINOLOGY CLINIC | Facility: CLINIC | Age: 39
End: 2019-02-02

## 2019-02-06 ENCOUNTER — LAB ENCOUNTER (OUTPATIENT)
Dept: LAB | Age: 39
End: 2019-02-06
Attending: FAMILY MEDICINE
Payer: COMMERCIAL

## 2019-02-06 ENCOUNTER — OFFICE VISIT (OUTPATIENT)
Dept: FAMILY MEDICINE CLINIC | Facility: CLINIC | Age: 39
End: 2019-02-06
Payer: COMMERCIAL

## 2019-02-06 VITALS
HEART RATE: 80 BPM | BODY MASS INDEX: 34.21 KG/M2 | DIASTOLIC BLOOD PRESSURE: 78 MMHG | RESPIRATION RATE: 16 BRPM | WEIGHT: 218 LBS | HEIGHT: 67 IN | SYSTOLIC BLOOD PRESSURE: 116 MMHG | TEMPERATURE: 98 F | OXYGEN SATURATION: 98 %

## 2019-02-06 DIAGNOSIS — Z13.228 SCREENING FOR ENDOCRINE, NUTRITIONAL, METABOLIC AND IMMUNITY DISORDER: ICD-10-CM

## 2019-02-06 DIAGNOSIS — E11.29 UNCONTROLLED TYPE 2 DIABETES MELLITUS WITH MICROALBUMINURIA (HCC): ICD-10-CM

## 2019-02-06 DIAGNOSIS — Z13.0 SCREENING FOR ENDOCRINE, NUTRITIONAL, METABOLIC AND IMMUNITY DISORDER: ICD-10-CM

## 2019-02-06 DIAGNOSIS — Z13.29 SCREENING FOR ENDOCRINE, NUTRITIONAL, METABOLIC AND IMMUNITY DISORDER: ICD-10-CM

## 2019-02-06 DIAGNOSIS — E11.65 UNCONTROLLED TYPE 2 DIABETES MELLITUS WITH MICROALBUMINURIA (HCC): ICD-10-CM

## 2019-02-06 DIAGNOSIS — Z00.00 ANNUAL PHYSICAL EXAM: Primary | ICD-10-CM

## 2019-02-06 DIAGNOSIS — Z13.21 SCREENING FOR ENDOCRINE, NUTRITIONAL, METABOLIC AND IMMUNITY DISORDER: ICD-10-CM

## 2019-02-06 DIAGNOSIS — R80.9 UNCONTROLLED TYPE 2 DIABETES MELLITUS WITH MICROALBUMINURIA (HCC): ICD-10-CM

## 2019-02-06 LAB
ALBUMIN SERPL-MCNC: 3.8 G/DL (ref 3.1–4.5)
ALBUMIN/GLOB SERPL: 0.9 {RATIO} (ref 1–2)
ALP LIVER SERPL-CCNC: 74 U/L (ref 45–117)
ALT SERPL-CCNC: 28 U/L (ref 17–63)
ANION GAP SERPL CALC-SCNC: 8 MMOL/L (ref 0–18)
AST SERPL-CCNC: 9 U/L (ref 15–41)
BASOPHILS # BLD AUTO: 0.04 X10(3) UL (ref 0–0.2)
BASOPHILS NFR BLD AUTO: 0.5 %
BILIRUB SERPL-MCNC: 0.3 MG/DL (ref 0.1–2)
BUN BLD-MCNC: 12 MG/DL (ref 8–20)
BUN/CREAT SERPL: 19 (ref 10–20)
CALCIUM BLD-MCNC: 8.7 MG/DL (ref 8.3–10.3)
CHLORIDE SERPL-SCNC: 103 MMOL/L (ref 101–111)
CHOLEST SMN-MCNC: 179 MG/DL (ref ?–200)
CO2 SERPL-SCNC: 27 MMOL/L (ref 22–32)
CREAT BLD-MCNC: 0.63 MG/DL (ref 0.7–1.3)
CREAT UR-SCNC: 87.2 MG/DL
DEPRECATED RDW RBC AUTO: 44.8 FL (ref 35.1–46.3)
EOSINOPHIL # BLD AUTO: 0.07 X10(3) UL (ref 0–0.7)
EOSINOPHIL NFR BLD AUTO: 0.8 %
ERYTHROCYTE [DISTWIDTH] IN BLOOD BY AUTOMATED COUNT: 14.8 % (ref 11–15)
EST. AVERAGE GLUCOSE BLD GHB EST-MCNC: 146 MG/DL (ref 68–126)
GLOBULIN PLAS-MCNC: 4.3 G/DL (ref 2.8–4.4)
GLUCOSE BLD-MCNC: 99 MG/DL (ref 70–99)
HBA1C MFR BLD HPLC: 6.7 % (ref ?–5.7)
HCT VFR BLD AUTO: 44.6 % (ref 39–53)
HDLC SERPL-MCNC: 32 MG/DL (ref 40–59)
HGB BLD-MCNC: 13.7 G/DL (ref 13–17.5)
IMM GRANULOCYTES # BLD AUTO: 0.01 X10(3) UL (ref 0–1)
IMM GRANULOCYTES NFR BLD: 0.1 %
LDLC SERPL CALC-MCNC: 125 MG/DL (ref ?–100)
LYMPHOCYTES # BLD AUTO: 2.51 X10(3) UL (ref 1–4)
LYMPHOCYTES NFR BLD AUTO: 28.4 %
M PROTEIN MFR SERPL ELPH: 8.1 G/DL (ref 6.4–8.2)
MCH RBC QN AUTO: 25.2 PG (ref 26–34)
MCHC RBC AUTO-ENTMCNC: 30.7 G/DL (ref 31–37)
MCV RBC AUTO: 82.1 FL (ref 80–100)
MICROALBUMIN UR-MCNC: 8.03 MG/DL
MICROALBUMIN/CREAT 24H UR-RTO: 92.1 UG/MG (ref ?–30)
MONOCYTES # BLD AUTO: 0.48 X10(3) UL (ref 0.1–1)
MONOCYTES NFR BLD AUTO: 5.4 %
NEUTROPHILS # BLD AUTO: 5.73 X10 (3) UL (ref 1.5–7.7)
NEUTROPHILS # BLD AUTO: 5.73 X10(3) UL (ref 1.5–7.7)
NEUTROPHILS NFR BLD AUTO: 64.8 %
NONHDLC SERPL-MCNC: 147 MG/DL (ref ?–130)
OSMOLALITY SERPL CALC.SUM OF ELEC: 286 MOSM/KG (ref 275–295)
PLATELET # BLD AUTO: 360 10(3)UL (ref 150–450)
POTASSIUM SERPL-SCNC: 3.8 MMOL/L (ref 3.6–5.1)
RBC # BLD AUTO: 5.43 X10(6)UL (ref 4.3–5.7)
SODIUM SERPL-SCNC: 138 MMOL/L (ref 136–144)
TRIGL SERPL-MCNC: 109 MG/DL (ref 30–149)
TSI SER-ACNC: 0.9 MIU/ML (ref 0.35–5.5)
VIT D+METAB SERPL-MCNC: 46.9 NG/ML (ref 30–100)
VLDLC SERPL CALC-MCNC: 22 MG/DL (ref 0–30)
WBC # BLD AUTO: 8.8 X10(3) UL (ref 4–11)

## 2019-02-06 PROCEDURE — 84443 ASSAY THYROID STIM HORMONE: CPT

## 2019-02-06 PROCEDURE — 80053 COMPREHEN METABOLIC PANEL: CPT

## 2019-02-06 PROCEDURE — 99395 PREV VISIT EST AGE 18-39: CPT | Performed by: FAMILY MEDICINE

## 2019-02-06 PROCEDURE — 82306 VITAMIN D 25 HYDROXY: CPT

## 2019-02-06 PROCEDURE — 85025 COMPLETE CBC W/AUTO DIFF WBC: CPT

## 2019-02-06 PROCEDURE — 82570 ASSAY OF URINE CREATININE: CPT

## 2019-02-06 PROCEDURE — 80061 LIPID PANEL: CPT

## 2019-02-06 PROCEDURE — 83036 HEMOGLOBIN GLYCOSYLATED A1C: CPT

## 2019-02-06 PROCEDURE — 82043 UR ALBUMIN QUANTITATIVE: CPT

## 2019-02-06 PROCEDURE — 36415 COLL VENOUS BLD VENIPUNCTURE: CPT

## 2019-02-06 NOTE — PROGRESS NOTES
Patient presents with:  Physical      Paul Marin is a 41year old year old who presents for recheck of diabetes. Pt has been checking feet on a regular basis. Pt denies any tingling of the feet.  Pt denies any sx's of depression and reports mood's s - - - - 225 lb -   BP (enc vitals) - 116/78 - 134/76 143/79 128/80 - 130/82   Last Foot Exam - - - - - - - -   Last Eye Exam - - 1/23/2019 - - - - -   Eye Doctor Name - - Dr Vale Lan, [address] [place] [address] 12.0 (H)     LDL Cholesterol (mg/dL)   Date Value   11/05/2018 110 (H)   06/21/2018 113   01/02/2018      Comment:     Unable to calculate LDL due to Triglycerides >400.0 mg/dL         LDL-CHOLESTEROL (mg/dL (calc))   Date Value   10/12/2011 106     LDL CH Psychiatric/Behavioral: The patient is not nervous/anxious. No depression.     Patient Active Problem List:     Attention deficit disorder without mention of hyperactivity     Other testicular hypofunction     Apnea     Vitamin D deficiency     Type II di Rt. Knee     Family History   Problem Relation Age of Onset   • Heart Attack Paternal Grandmother    • Diabetes Maternal Grandmother    • Heart Disease Father    • Other (diabetes mellitus) Father    • Other (heart failure) Father    • Fibromyalgia Mother (primary encounter diagnosis)  Screening for endocrine, nutritional, metabolic and immunity disorder  Uncontrolled type 2 diabetes mellitus with microalbuminuria (hcc)    Diabetes Education:  Diabetes disease process, Nutritional Management, Physical Activ

## 2019-02-08 NOTE — TELEPHONE ENCOUNTER
Received PA request from Airam Omer. Per , hold off until pt's next visit, 2/14/19. Pt was given 5 soliqua pens on 2/2 so has plenty.

## 2019-02-11 DIAGNOSIS — E11.29 TYPE 2 DIABETES MELLITUS WITH MICROALBUMINURIA, WITHOUT LONG-TERM CURRENT USE OF INSULIN (HCC): ICD-10-CM

## 2019-02-11 DIAGNOSIS — R80.9 TYPE 2 DIABETES MELLITUS WITH MICROALBUMINURIA, WITHOUT LONG-TERM CURRENT USE OF INSULIN (HCC): ICD-10-CM

## 2019-02-11 RX ORDER — PEN NEEDLE, DIABETIC, SAFETY 30 GX3/16"
NEEDLE, DISPOSABLE MISCELLANEOUS
Qty: 100 EACH | Refills: 1 | Status: SHIPPED | OUTPATIENT
Start: 2019-02-11 | End: 2019-02-14

## 2019-02-14 ENCOUNTER — OFFICE VISIT (OUTPATIENT)
Dept: ENDOCRINOLOGY CLINIC | Facility: CLINIC | Age: 39
End: 2019-02-14
Payer: COMMERCIAL

## 2019-02-14 VITALS
DIASTOLIC BLOOD PRESSURE: 64 MMHG | RESPIRATION RATE: 20 BRPM | TEMPERATURE: 98 F | HEIGHT: 67 IN | BODY MASS INDEX: 34.69 KG/M2 | HEART RATE: 80 BPM | WEIGHT: 221 LBS | SYSTOLIC BLOOD PRESSURE: 100 MMHG

## 2019-02-14 DIAGNOSIS — E78.5 HYPERLIPIDEMIA ASSOCIATED WITH TYPE 2 DIABETES MELLITUS (HCC): ICD-10-CM

## 2019-02-14 DIAGNOSIS — Z79.4 TYPE 2 DIABETES MELLITUS WITH MICROALBUMINURIA, WITH LONG-TERM CURRENT USE OF INSULIN (HCC): Primary | ICD-10-CM

## 2019-02-14 DIAGNOSIS — R80.9 TYPE 2 DIABETES MELLITUS WITH MICROALBUMINURIA, WITH LONG-TERM CURRENT USE OF INSULIN (HCC): Primary | ICD-10-CM

## 2019-02-14 DIAGNOSIS — E11.29 TYPE 2 DIABETES MELLITUS WITH MICROALBUMINURIA, WITH LONG-TERM CURRENT USE OF INSULIN (HCC): Primary | ICD-10-CM

## 2019-02-14 DIAGNOSIS — R80.9 MICROALBUMINURIA: ICD-10-CM

## 2019-02-14 DIAGNOSIS — E11.69 HYPERLIPIDEMIA ASSOCIATED WITH TYPE 2 DIABETES MELLITUS (HCC): ICD-10-CM

## 2019-02-14 PROCEDURE — 95251 CONT GLUC MNTR ANALYSIS I&R: CPT | Performed by: NURSE PRACTITIONER

## 2019-02-14 PROCEDURE — 99214 OFFICE O/P EST MOD 30 MIN: CPT | Performed by: NURSE PRACTITIONER

## 2019-02-14 RX ORDER — METFORMIN HYDROCHLORIDE 500 MG/1
1000 TABLET, EXTENDED RELEASE ORAL 2 TIMES DAILY WITH MEALS
Qty: 120 TABLET | Refills: 2 | Status: SHIPPED | OUTPATIENT
Start: 2019-02-14 | End: 2019-05-08

## 2019-02-14 NOTE — PROGRESS NOTES
Pt. referred for Victoza instruction:     Type/Dose/Schedule: Inject 0.6 mg x1 wk, if no side effects increase to 1.2 mg daily x1wk, then increase to 1.8 mg daily  Action/Side Effects/ Interactions/Precautions/Missed doses:  Incretin hormones stimulate pan

## 2019-02-14 NOTE — PATIENT INSTRUCTIONS
We are here to support you with Diabetes but please remember that you still need your primary care doctor for your routine health maintenance. Your current A1C: 6.7%  This test provides us with your average blood sugar for the past 3 months.    The main g targets:  Before breakfast:   (preferably < 110)  2 hours After meals: less than 180 (preferably less than 150)   Call for persistent blood sugars < 75 or > 200.    Blood sugars greater than 200 are not acceptable to reach your goal of improving diabe

## 2019-02-14 NOTE — PROGRESS NOTES
HPI:   Mehnaz Payne is a 41year old male who presents for recheck of his type 2 diabetes. Patient wearing personal [employer] Angela CGM. Patient feels much better, he has been careful with eating healthy foods in healthy amounts.   He has had several father; diabetes mellitus in his father and mother; heart failure in his father; respitory failure in his maternal grandfather. He  reports that  has never smoked. he has never used smokeless tobacco. He reports that he drinks alcohol.  He reports that he Date/Time    A1C 6.7 (H) 02/06/2019 10:44 AM          Microalb (most recent labs)   Lab Results   Component Value Date/Time    MICROALBUMIN 6.7 10/12/2011 12:00 AM    MICROALBCREA 92.1 (H) 02/06/2019 10:44 AM        Lab results reviewed.     REVIEW OF SYSTE of both lower legs/feet is normal as well. Foot care reviewed. ASSESSMENT AND PLAN:   As for his Diabetes, it is well controlled, stable, improved. Recommendations are: lose weight by increased dietary compliance and exercise and check feet daily. diabetes mellitus with microalbuminuria, with long-term current use of insulin (HCC)  -     MetFORMIN HCl  MG Oral Tablet 24 Hr; Take 2 tablets (1,000 mg total) by mouth 2 (two) times daily with meals.  -     LIPID PANEL;  Future  -     Liraglutide (V

## 2019-02-26 ENCOUNTER — PATIENT MESSAGE (OUTPATIENT)
Dept: ENDOCRINOLOGY CLINIC | Facility: CLINIC | Age: 39
End: 2019-02-26

## 2019-02-26 NOTE — TELEPHONE ENCOUNTER
From: Parviz Moreau  To: RAINER Norton  Sent: 2/26/2019 7:36 AM CST  Subject: Non-Urgent Medical Question    I have two days left of the insulin and my blood sugars have been stable 100's no more than 135.  I don't remember if you said you would pr

## 2019-02-26 NOTE — PROGRESS NOTES
Review/print yvonne CGM for BG trend review   May be able to decrease [place] per KIM SPEAR last office visit note since starting Victoza.

## 2019-02-27 NOTE — PROGRESS NOTES
Spoke with patient, currently taking Tresiba 40 units SC daily and Victoza 1.2mg SC daily. Patient experiencing some nausea so will continue Victoza 1.2mg SC daily until follow up.   Decreased patients Tresiba to 30 units SC daily and sent new prescription

## 2019-03-13 DIAGNOSIS — R80.9 TYPE 2 DIABETES MELLITUS WITH MICROALBUMINURIA, WITH LONG-TERM CURRENT USE OF INSULIN (HCC): ICD-10-CM

## 2019-03-13 DIAGNOSIS — E11.29 TYPE 2 DIABETES MELLITUS WITH MICROALBUMINURIA, WITH LONG-TERM CURRENT USE OF INSULIN (HCC): ICD-10-CM

## 2019-03-13 DIAGNOSIS — Z79.4 TYPE 2 DIABETES MELLITUS WITH MICROALBUMINURIA, WITH LONG-TERM CURRENT USE OF INSULIN (HCC): ICD-10-CM

## 2019-04-12 ENCOUNTER — OFFICE VISIT (OUTPATIENT)
Dept: FAMILY MEDICINE CLINIC | Facility: CLINIC | Age: 39
End: 2019-04-12
Payer: COMMERCIAL

## 2019-04-12 VITALS
TEMPERATURE: 98 F | WEIGHT: 224 LBS | BODY MASS INDEX: 35.16 KG/M2 | HEIGHT: 67 IN | SYSTOLIC BLOOD PRESSURE: 128 MMHG | DIASTOLIC BLOOD PRESSURE: 68 MMHG | HEART RATE: 90 BPM | OXYGEN SATURATION: 99 % | RESPIRATION RATE: 20 BRPM

## 2019-04-12 DIAGNOSIS — J01.40 ACUTE PANSINUSITIS, RECURRENCE NOT SPECIFIED: Primary | ICD-10-CM

## 2019-04-12 PROCEDURE — 99213 OFFICE O/P EST LOW 20 MIN: CPT | Performed by: FAMILY MEDICINE

## 2019-04-12 RX ORDER — AMOXICILLIN AND CLAVULANATE POTASSIUM 875; 125 MG/1; MG/1
1 TABLET, FILM COATED ORAL 2 TIMES DAILY
Qty: 20 TABLET | Refills: 0 | Status: SHIPPED | OUTPATIENT
Start: 2019-04-12 | End: 2019-04-22

## 2019-04-12 RX ORDER — BENZONATATE 200 MG/1
200 CAPSULE ORAL 3 TIMES DAILY PRN
Qty: 30 CAPSULE | Refills: 0 | Status: SHIPPED | OUTPATIENT
Start: 2019-04-12 | End: 2019-05-08

## 2019-04-12 NOTE — PROGRESS NOTES
CHIEF COMPLAINT:   Patient presents with:  Nasal Congestion: dry cough, stuffy nose, post nasal drip      HPI:   Shira Schulz is a 41year old male who presents for upper respiratory symptoms for several days. Patient reports sore throat only at the be unspecified type diabetes mellitus without mention of complication, not stated as uncontrolled       Past Surgical History:   Procedure Laterality Date   • KNEE ARTHROSCP HARV  6/11    Rt.  Knee         Social History    Tobacco Use      Smoking status: [employer] Take 1 capsule (200 mg total) by mouth 3 (three) times daily as needed for cough. Imaging & Consults:  None   Pt to start the above medications.  Patient is to increase fluids, rest, and vitamin C intake; I also instructed them to use a cool-mist vapo

## 2019-04-12 NOTE — PATIENT INSTRUCTIONS
Preventing Sinusitis    Colds, flu, and allergies make it more likely for you to get sinusitis. Do your best to prevent sinusitis by preventing these problems. Do what you can to avoid getting colds and other infections.  Stay away from things that cause · Stay away from all types of smoke, which dries out sinus linings. This includes tobacco smoke and chemical smoke in workplace settings. · Use saltwater rinses. Date Last Reviewed: 10/1/2016  © 0398-1045 [employer].  [facility]

## 2019-04-22 ENCOUNTER — OFFICE VISIT (OUTPATIENT)
Dept: FAMILY MEDICINE CLINIC | Facility: CLINIC | Age: 39
End: 2019-04-22
Payer: COMMERCIAL

## 2019-04-22 VITALS
SYSTOLIC BLOOD PRESSURE: 118 MMHG | OXYGEN SATURATION: 98 % | DIASTOLIC BLOOD PRESSURE: 70 MMHG | TEMPERATURE: 98 F | BODY MASS INDEX: 35 KG/M2 | HEART RATE: 96 BPM | WEIGHT: 223 LBS | RESPIRATION RATE: 20 BRPM | HEIGHT: 67 IN

## 2019-04-22 DIAGNOSIS — H69.83 EUSTACHIAN TUBE DYSFUNCTION, BILATERAL: Primary | ICD-10-CM

## 2019-04-22 PROCEDURE — 99213 OFFICE O/P EST LOW 20 MIN: CPT | Performed by: PHYSICIAN ASSISTANT

## 2019-04-22 RX ORDER — FLUTICASONE PROPIONATE 50 MCG
2 SPRAY, SUSPENSION (ML) NASAL DAILY
Qty: 1 BOTTLE | Refills: 0 | Status: SHIPPED | OUTPATIENT
Start: 2019-04-22 | End: 2019-05-06

## 2019-04-22 NOTE — PROGRESS NOTES
CHIEF COMPLAINT:   Patient presents with:  Ear Problem: X 4 days       HPI:   Bere Berg is a 41year old male who presents to clinic today with complaints of feeling his ears are muffled for the past 4 days. The patient denies pain.  He tried usi DAILY Disp: 100 each Rfl: 0   Amoxicillin-Pot Clavulanate 875-125 MG Oral Tab Take 1 tablet by mouth 2 (two) times daily for 10 days.  Disp: 20 tablet Rfl: 0   benzonatate 200 MG Oral Cap Take 1 capsule (200 mg total) by mouth 3 (three) times daily as neede lymphadenopathy.       ASSESSMENT AND PLAN:   Josefa Gaming is a 41year old male who presents with ear problems symptoms are consistent with    ASSESSMENT:  Eustachian tube dysfunction, bilateral  (primary encounter diagnosis)    PLAN: Meds as listed b

## 2019-05-08 ENCOUNTER — OFFICE VISIT (OUTPATIENT)
Dept: ENDOCRINOLOGY CLINIC | Facility: CLINIC | Age: 39
End: 2019-05-08

## 2019-05-08 VITALS
RESPIRATION RATE: 20 BRPM | HEIGHT: 67 IN | DIASTOLIC BLOOD PRESSURE: 80 MMHG | BODY MASS INDEX: 34.37 KG/M2 | SYSTOLIC BLOOD PRESSURE: 102 MMHG | WEIGHT: 219 LBS | HEART RATE: 76 BPM

## 2019-05-08 DIAGNOSIS — R80.9 TYPE 2 DIABETES MELLITUS WITH MICROALBUMINURIA, WITH LONG-TERM CURRENT USE OF INSULIN (HCC): Primary | ICD-10-CM

## 2019-05-08 DIAGNOSIS — E78.5 HYPERLIPIDEMIA ASSOCIATED WITH TYPE 2 DIABETES MELLITUS (HCC): ICD-10-CM

## 2019-05-08 DIAGNOSIS — E11.29 TYPE 2 DIABETES MELLITUS WITH MICROALBUMINURIA, WITH LONG-TERM CURRENT USE OF INSULIN (HCC): Primary | ICD-10-CM

## 2019-05-08 DIAGNOSIS — Z79.4 TYPE 2 DIABETES MELLITUS WITH MICROALBUMINURIA, WITH LONG-TERM CURRENT USE OF INSULIN (HCC): Primary | ICD-10-CM

## 2019-05-08 DIAGNOSIS — E11.69 HYPERLIPIDEMIA ASSOCIATED WITH TYPE 2 DIABETES MELLITUS (HCC): ICD-10-CM

## 2019-05-08 PROCEDURE — 83036 HEMOGLOBIN GLYCOSYLATED A1C: CPT | Performed by: NURSE PRACTITIONER

## 2019-05-08 PROCEDURE — 95251 CONT GLUC MNTR ANALYSIS I&R: CPT | Performed by: NURSE PRACTITIONER

## 2019-05-08 PROCEDURE — 99214 OFFICE O/P EST MOD 30 MIN: CPT | Performed by: NURSE PRACTITIONER

## 2019-05-08 RX ORDER — LISINOPRIL 5 MG/1
5 TABLET ORAL DAILY
Qty: 30 TABLET | Refills: 2 | COMMUNITY
Start: 2019-05-08 | End: 2019-07-31

## 2019-05-08 RX ORDER — METFORMIN HYDROCHLORIDE 500 MG/1
1000 TABLET, EXTENDED RELEASE ORAL 2 TIMES DAILY WITH MEALS
Qty: 360 TABLET | Refills: 1 | Status: SHIPPED | OUTPATIENT
Start: 2019-05-08 | End: 2019-10-24

## 2019-05-08 NOTE — PROGRESS NOTES
HPI:   Lukasz Mendoza is a 41year old male who presents for recheck of his type 2 diabetes. Patient using personal [employer] CGM. Patient has joined [employer]OG[employer] and is doing well with diet and has increased daily physical activity.   Patient allergies, and Type II or unspecified type diabetes mellitus without mention of complication, not stated as uncontrolled.    His family history includes Diabetes in his maternal grandmother; Fibromyalgia in his mother; Heart Attack in his paternal grandmoth Component Value Date/Time    CHOLEST 179 02/06/2019 10:44 AM    TRIG 109 02/06/2019 10:44 AM    HDL 32 (L) 02/06/2019 10:44 AM     (H) 02/06/2019 10:44 AM    NONHDLC 147 (H) 02/06/2019 10:44 AM          Diabetes  (most recent labs)   Lab Results discontinue Victoza in attempt to decrease injection frequency per patient request.  Patient will continue using personal Freestyle Angela CGM for glucose monitoring.   Overview of complications of uncontrolled diabetes including foot, eye, dental, renal and current use of insulin (HCC)  -     HEMOGLOBIN A1C  -     metFORMIN HCl  MG Oral Tablet 24 Hr; Take 2 tablets (1,000 mg total) by mouth 2 (two) times daily with meals.   -     Semaglutide (OZEMPIC) 0.25 or 0.5 MG/DOSE Subcutaneous Solution Pen-injecto

## 2019-05-08 NOTE — PATIENT INSTRUCTIONS
We are here to support you with Diabetes but please remember that you still need your primary care doctor for your routine health maintenance. Your current A1C: 6.3%  This test provides us with your average blood sugar for the past 3 months.    The main g breakfast:   (preferably < 110)  2 hours After meals: less than 180 (preferably less than 150)   Call for persistent blood sugars < 75 or > 200.    Blood sugars greater than 200 are not acceptable to reach your goal of improving diabetes    [address] [facility]

## 2019-06-18 DIAGNOSIS — R80.9 TYPE 2 DIABETES MELLITUS WITH MICROALBUMINURIA, WITHOUT LONG-TERM CURRENT USE OF INSULIN (HCC): ICD-10-CM

## 2019-06-18 DIAGNOSIS — E11.29 TYPE 2 DIABETES MELLITUS WITH MICROALBUMINURIA, WITHOUT LONG-TERM CURRENT USE OF INSULIN (HCC): ICD-10-CM

## 2019-06-19 RX ORDER — PEN NEEDLE, DIABETIC, SAFETY 30 GX3/16"
NEEDLE, DISPOSABLE MISCELLANEOUS
Qty: 100 EACH | Refills: 5 | Status: SHIPPED | OUTPATIENT
Start: 2019-06-19 | End: 2020-03-06 | Stop reason: ALTCHOICE

## 2019-06-22 ENCOUNTER — PATIENT MESSAGE (OUTPATIENT)
Dept: FAMILY MEDICINE CLINIC | Facility: CLINIC | Age: 39
End: 2019-06-22

## 2019-06-24 NOTE — TELEPHONE ENCOUNTER
Please see above message. Patient is requesting to have the directions updated on his needles. Currently written for daily, patient states that he uses 2 daily. Please advise. Thank you!

## 2019-06-24 NOTE — TELEPHONE ENCOUNTER
From: Parviz Moreau  To: Francesco Morales MD  Sent: 6/22/2019 10:25 AM CDT  Subject: Prescription Question    My autoshield needles are once daily, I have 2 prescriptions that need these. Pharmacy won't refill prescription due to once daily.  Is there any

## 2019-07-04 ENCOUNTER — PATIENT MESSAGE (OUTPATIENT)
Dept: FAMILY MEDICINE CLINIC | Facility: CLINIC | Age: 39
End: 2019-07-04

## 2019-07-04 DIAGNOSIS — E11.29 UNCONTROLLED TYPE 2 DIABETES MELLITUS WITH MICROALBUMINURIA (HCC): ICD-10-CM

## 2019-07-04 DIAGNOSIS — R80.9 UNCONTROLLED TYPE 2 DIABETES MELLITUS WITH MICROALBUMINURIA (HCC): ICD-10-CM

## 2019-07-04 DIAGNOSIS — E11.65 UNCONTROLLED TYPE 2 DIABETES MELLITUS WITH MICROALBUMINURIA (HCC): ICD-10-CM

## 2019-07-05 RX ORDER — FLASH GLUCOSE SENSOR
KIT MISCELLANEOUS
Qty: 2 EACH | Refills: 2 | Status: SHIPPED | OUTPATIENT
Start: 2019-07-05 | End: 2019-10-21

## 2019-07-05 RX ORDER — INSULIN GLARGINE 100 [IU]/ML
30 INJECTION, SOLUTION SUBCUTANEOUS NIGHTLY
Qty: 15 ML | Refills: 2 | Status: SHIPPED | OUTPATIENT
Start: 2019-07-05 | End: 2019-10-24

## 2019-07-05 NOTE — TELEPHONE ENCOUNTER
Patient concerned-his sister recently diagnosed with cervical cancer. He's asking about any screenings he should consider. OK to advise nothing needed for him?

## 2019-07-05 NOTE — TELEPHONE ENCOUNTER
It's new recommendation to have gardisil for men under 39. If he has new sexual partners ie not /not in monogamous relationship it's indicated. But not necessary if he doesn't. Correct-no screening for him.

## 2019-07-05 NOTE — TELEPHONE ENCOUNTER
From: Elaine Miguel  To: Guru Flores MD  Sent: 7/4/2019 2:48 PM CDT  Subject: Other    I've been concerned since my sister got a diagnosis of cancer. Is there any kind of screenings I should consider?

## 2019-07-31 ENCOUNTER — APPOINTMENT (OUTPATIENT)
Dept: LAB | Age: 39
End: 2019-07-31
Attending: NURSE PRACTITIONER
Payer: COMMERCIAL

## 2019-07-31 ENCOUNTER — OFFICE VISIT (OUTPATIENT)
Dept: FAMILY MEDICINE CLINIC | Facility: CLINIC | Age: 39
End: 2019-07-31
Payer: COMMERCIAL

## 2019-07-31 VITALS
SYSTOLIC BLOOD PRESSURE: 118 MMHG | BODY MASS INDEX: 33.9 KG/M2 | WEIGHT: 216 LBS | HEART RATE: 92 BPM | DIASTOLIC BLOOD PRESSURE: 76 MMHG | RESPIRATION RATE: 16 BRPM | HEIGHT: 67 IN | TEMPERATURE: 99 F

## 2019-07-31 DIAGNOSIS — E78.5 HYPERLIPIDEMIA ASSOCIATED WITH TYPE 2 DIABETES MELLITUS (HCC): Primary | ICD-10-CM

## 2019-07-31 DIAGNOSIS — E11.29 TYPE 2 DIABETES MELLITUS WITH MICROALBUMINURIA, WITH LONG-TERM CURRENT USE OF INSULIN (HCC): ICD-10-CM

## 2019-07-31 DIAGNOSIS — E78.5 HYPERLIPIDEMIA ASSOCIATED WITH TYPE 2 DIABETES MELLITUS (HCC): ICD-10-CM

## 2019-07-31 DIAGNOSIS — R80.9 TYPE 2 DIABETES MELLITUS WITH MICROALBUMINURIA, WITH LONG-TERM CURRENT USE OF INSULIN (HCC): ICD-10-CM

## 2019-07-31 DIAGNOSIS — E11.69 HYPERLIPIDEMIA ASSOCIATED WITH TYPE 2 DIABETES MELLITUS (HCC): Primary | ICD-10-CM

## 2019-07-31 DIAGNOSIS — Z79.4 TYPE 2 DIABETES MELLITUS WITH MICROALBUMINURIA, WITH LONG-TERM CURRENT USE OF INSULIN (HCC): ICD-10-CM

## 2019-07-31 DIAGNOSIS — F41.1 GAD (GENERALIZED ANXIETY DISORDER): ICD-10-CM

## 2019-07-31 DIAGNOSIS — E11.69 HYPERLIPIDEMIA ASSOCIATED WITH TYPE 2 DIABETES MELLITUS (HCC): ICD-10-CM

## 2019-07-31 LAB
ALBUMIN SERPL-MCNC: 3.9 G/DL (ref 3.4–5)
ALBUMIN/GLOB SERPL: 0.9 {RATIO} (ref 1–2)
ALP LIVER SERPL-CCNC: 74 U/L (ref 45–117)
ALT SERPL-CCNC: 39 U/L (ref 16–61)
ANION GAP SERPL CALC-SCNC: 5 MMOL/L (ref 0–18)
AST SERPL-CCNC: 11 U/L (ref 15–37)
BILIRUB SERPL-MCNC: 0.4 MG/DL (ref 0.1–2)
BUN BLD-MCNC: 15 MG/DL (ref 7–18)
BUN/CREAT SERPL: 21.4 (ref 10–20)
CALCIUM BLD-MCNC: 9.3 MG/DL (ref 8.5–10.1)
CHLORIDE SERPL-SCNC: 104 MMOL/L (ref 98–112)
CHOLEST SMN-MCNC: 174 MG/DL (ref ?–200)
CO2 SERPL-SCNC: 28 MMOL/L (ref 21–32)
CREAT BLD-MCNC: 0.7 MG/DL (ref 0.7–1.3)
CREAT UR-SCNC: 60.2 MG/DL
GLOBULIN PLAS-MCNC: 4.2 G/DL (ref 2.8–4.4)
GLUCOSE BLD-MCNC: 159 MG/DL (ref 70–99)
HDLC SERPL-MCNC: 39 MG/DL (ref 40–59)
LDLC SERPL CALC-MCNC: 107 MG/DL (ref ?–100)
M PROTEIN MFR SERPL ELPH: 8.1 G/DL (ref 6.4–8.2)
MICROALBUMIN UR-MCNC: 6.96 MG/DL
MICROALBUMIN/CREAT 24H UR-RTO: 115.6 UG/MG (ref ?–30)
NONHDLC SERPL-MCNC: 135 MG/DL (ref ?–130)
OSMOLALITY SERPL CALC.SUM OF ELEC: 288 MOSM/KG (ref 275–295)
POTASSIUM SERPL-SCNC: 3.9 MMOL/L (ref 3.5–5.1)
SODIUM SERPL-SCNC: 137 MMOL/L (ref 136–145)
TRIGL SERPL-MCNC: 140 MG/DL (ref 30–149)
VLDLC SERPL CALC-MCNC: 28 MG/DL (ref 0–30)

## 2019-07-31 PROCEDURE — 99214 OFFICE O/P EST MOD 30 MIN: CPT | Performed by: FAMILY MEDICINE

## 2019-07-31 PROCEDURE — 82043 UR ALBUMIN QUANTITATIVE: CPT

## 2019-07-31 PROCEDURE — 80053 COMPREHEN METABOLIC PANEL: CPT

## 2019-07-31 PROCEDURE — 36415 COLL VENOUS BLD VENIPUNCTURE: CPT

## 2019-07-31 PROCEDURE — 82570 ASSAY OF URINE CREATININE: CPT

## 2019-07-31 PROCEDURE — 80061 LIPID PANEL: CPT

## 2019-07-31 NOTE — PROGRESS NOTES
Patient presents with: Anxiety      Maria A Leal is a 42year old year old who presents for recheck of diabetes. Pt has been checking feet on a regular basis. Pt denies any tingling of the feet.  Pt denies any sx's of depression and reports mood's st - -   A1C 4.3 - 5.6 % - 6. 3(A) - - - - -   A1C <5.7 % of total Hgb - - - - - - -   Weight (enc vitals) - 216 lb 219 lb 223 lb 224 lb 221 lb 218 lb -   BP (enc vitals) - 118/76 102/80 118/70 128/68 100/64 116/78 -   Last Foot Exam - - - - - 2/14/2019 - - (U/L)   Date Value   02/06/2019 9 (L)   12/09/2018 36   11/05/2018 13 (L)   07/29/2014 21   05/09/2014 16   03/19/2014 27   10/12/2011 14   05/25/2011 17   05/12/2011 13     ALT (U/L)   Date Value   02/06/2019 28   12/09/2018 38   11/05/2018 36   07/29/201 migraine without status migrainosus, not intractable     Hyperlipidemia associated with type 2 diabetes mellitus (HCC)     Microalbuminuria        Current Outpatient Medications:  BASAGLAR KWIKPEN 100 UNIT/ML Subcutaneous Solution Pen-injector Inject 30 Un lb   BMI 33.83 kg/m²   Constitutional: Oriented to person, place, and time. No distress. HEENT:  Normocephalic and atraumatic.  Hearing and tympanic membranes normal.  Nose normal. Oropharynx is clear and moist.   Eyes: Conjunctivae and EOM are normal. PE understanding and has no further questions at this time. Over 25 minutes was spent with patient reviewing medication, reviewing labs, and medical plan. Greater than 50% of visit spent on education and counseling.   Office Follow up visit: 3-6 months, wi

## 2019-07-31 NOTE — PATIENT INSTRUCTIONS
Aileen Reed, [address] [facility],   [address] [place] [address]     Phone: [phone]   Fax: [phone]     Dr. Tom Willis office.

## 2019-08-07 ENCOUNTER — OFFICE VISIT (OUTPATIENT)
Dept: ENDOCRINOLOGY CLINIC | Facility: CLINIC | Age: 39
End: 2019-08-07

## 2019-08-07 VITALS
HEIGHT: 67 IN | HEART RATE: 76 BPM | BODY MASS INDEX: 34.53 KG/M2 | WEIGHT: 220 LBS | SYSTOLIC BLOOD PRESSURE: 100 MMHG | DIASTOLIC BLOOD PRESSURE: 74 MMHG | RESPIRATION RATE: 16 BRPM

## 2019-08-07 DIAGNOSIS — Z79.4 TYPE 2 DIABETES MELLITUS WITH MICROALBUMINURIA, WITH LONG-TERM CURRENT USE OF INSULIN (HCC): Primary | ICD-10-CM

## 2019-08-07 DIAGNOSIS — E78.5 HYPERLIPIDEMIA ASSOCIATED WITH TYPE 2 DIABETES MELLITUS (HCC): ICD-10-CM

## 2019-08-07 DIAGNOSIS — E11.69 HYPERLIPIDEMIA ASSOCIATED WITH TYPE 2 DIABETES MELLITUS (HCC): ICD-10-CM

## 2019-08-07 DIAGNOSIS — R80.9 TYPE 2 DIABETES MELLITUS WITH MICROALBUMINURIA, WITH LONG-TERM CURRENT USE OF INSULIN (HCC): Primary | ICD-10-CM

## 2019-08-07 DIAGNOSIS — E11.29 TYPE 2 DIABETES MELLITUS WITH MICROALBUMINURIA, WITH LONG-TERM CURRENT USE OF INSULIN (HCC): Primary | ICD-10-CM

## 2019-08-07 LAB
CARTRIDGE EXPIRATION DATE: 2021 DATE
CARTRIDGE LOT#: 540 NUMERIC
HEMOGLOBIN A1C: 6.6 % (ref 4.3–5.6)

## 2019-08-07 PROCEDURE — 99214 OFFICE O/P EST MOD 30 MIN: CPT | Performed by: NURSE PRACTITIONER

## 2019-08-07 PROCEDURE — 83036 HEMOGLOBIN GLYCOSYLATED A1C: CPT | Performed by: NURSE PRACTITIONER

## 2019-08-07 PROCEDURE — 95251 CONT GLUC MNTR ANALYSIS I&R: CPT | Performed by: NURSE PRACTITIONER

## 2019-08-07 NOTE — PROGRESS NOTES
HPI:   Brett Cho is a 42year old male who presents for recheck of his type 2 diabetes. Patient using personal [employer] Angela CGM for glucose monitoring.   Patient started Ozempic SC weekly at last office visit but stopped due to questionable GI u Encounters:  08/07/19 : 100/74  07/31/19 : 118/76  05/08/19 : 102/80         Past History:   He  has a past medical history of Bell's palsy, Bell's palsy, Blood pressure elevated without history of HTN, Essential hypertension, Other and unspecified hyperli 07/31/2019 07:45 AM    ALT 39 07/31/2019 07:45 AM    BILT 0.4 07/31/2019 07:45 AM    TP 8.1 07/31/2019 07:45 AM    ALB 3.9 07/31/2019 07:45 AM     07/31/2019 07:45 AM    K 3.9 07/31/2019 07:45 AM     07/31/2019 07:45 AM    CO2 28.0 07/31/2019 0 and exercise and check feet daily. Discussed resuming GLP-1 to current medication regimen. Reviewed action, risk vs benefit, dosing, and potential side effects.   Patient denies hx of pancreatitis, gastroparesis, or personal or family hx of medullary this patient. When is pneumonia vaccine due? Pneumococcal vaccination(1 of 1 - PPSV23) due on 06/20/2001    The patient indicates understanding of these issues and agrees to the plan.     Diagnoses and all orders for this visit:    Type 2 diabetes mellitus

## 2019-08-07 NOTE — PATIENT INSTRUCTIONS
We are here to support you with Diabetes but please remember that you still need your primary care doctor for your routine health maintenance. Your current A1C: 6.6%  This test provides us with your average blood sugar for the past 3 months.    The main g meals: less than 180 (preferably less than 150)   Call for persistent blood sugars < 75 or > 200. Blood sugars greater than 200 are not acceptable to reach your goal of improving diabetes    Health Maintenance:   1.  LABS: It is important to monitor your

## 2019-09-18 DIAGNOSIS — E11.29 TYPE 2 DIABETES MELLITUS WITH MICROALBUMINURIA, WITH LONG-TERM CURRENT USE OF INSULIN (HCC): ICD-10-CM

## 2019-09-18 DIAGNOSIS — R80.9 TYPE 2 DIABETES MELLITUS WITH MICROALBUMINURIA, WITH LONG-TERM CURRENT USE OF INSULIN (HCC): ICD-10-CM

## 2019-09-18 DIAGNOSIS — Z79.4 TYPE 2 DIABETES MELLITUS WITH MICROALBUMINURIA, WITH LONG-TERM CURRENT USE OF INSULIN (HCC): ICD-10-CM

## 2019-09-26 ENCOUNTER — OFFICE VISIT (OUTPATIENT)
Dept: ENDOCRINOLOGY CLINIC | Facility: CLINIC | Age: 39
End: 2019-09-26
Payer: COMMERCIAL

## 2019-09-26 VITALS
WEIGHT: 222 LBS | HEIGHT: 67 IN | BODY MASS INDEX: 34.84 KG/M2 | DIASTOLIC BLOOD PRESSURE: 70 MMHG | RESPIRATION RATE: 20 BRPM | HEART RATE: 76 BPM | SYSTOLIC BLOOD PRESSURE: 110 MMHG

## 2019-09-26 DIAGNOSIS — E11.29 TYPE 2 DIABETES MELLITUS WITH MICROALBUMINURIA, WITH LONG-TERM CURRENT USE OF INSULIN (HCC): Primary | ICD-10-CM

## 2019-09-26 DIAGNOSIS — E11.69 HYPERLIPIDEMIA ASSOCIATED WITH TYPE 2 DIABETES MELLITUS (HCC): ICD-10-CM

## 2019-09-26 DIAGNOSIS — R80.9 TYPE 2 DIABETES MELLITUS WITH MICROALBUMINURIA, WITH LONG-TERM CURRENT USE OF INSULIN (HCC): Primary | ICD-10-CM

## 2019-09-26 DIAGNOSIS — Z79.4 TYPE 2 DIABETES MELLITUS WITH MICROALBUMINURIA, WITH LONG-TERM CURRENT USE OF INSULIN (HCC): Primary | ICD-10-CM

## 2019-09-26 DIAGNOSIS — E78.5 HYPERLIPIDEMIA ASSOCIATED WITH TYPE 2 DIABETES MELLITUS (HCC): ICD-10-CM

## 2019-09-26 PROCEDURE — 99214 OFFICE O/P EST MOD 30 MIN: CPT | Performed by: NURSE PRACTITIONER

## 2019-09-26 NOTE — PROGRESS NOTES
HPI:   Johnie Adam is a 42year old male who presents for recheck of his type 2 diabetes. Patient lost his job approximately a month ago and is now under his [employer] but is not covering [address] [facility] so patient has not been taking.   Patient using mention of complication, not stated as uncontrolled.    His family history includes Diabetes in his maternal grandmother; Fibromyalgia in his mother; Heart Attack in his paternal grandmother; Heart Disease in his father; diabetes mellitus in his father and 07/31/2019 07:45 AM    CO2 28.0 07/31/2019 07:45 AM           Lipids  (most recent labs)   Lab Results   Component Value Date/Time    CHOLEST 174 07/31/2019 07:45 AM    TRIG 140 07/31/2019 07:45 AM    HDL 39 (L) 07/31/2019 07:45 AM     (H) 07/31/201 decrease by 2 units. Patient is to restart Ozempic at 0.25mg SC weekly x2 weeks, if patient tolerating well increase to 0.5mg SC weekly until follow up. Hypoglycemia S&S, Rx, and when to call MD reviewed using Rule of 15's.  Stressed need to call if 2 jesus 34.0-34.9,adult       Return in about 4 weeks (around 10/24/2019) for Personal CGM, 45 minute appointment. The risks and benefits of my recommendations, as well as other treatment options were discussed with the patient today.  questions were answered to

## 2019-09-26 NOTE — PATIENT INSTRUCTIONS
We are here to support you with Diabetes but please remember that you still need your primary care doctor for your routine health maintenance. Your current A1C: 6.6%  This test provides us with your average blood sugar for the past 3 months.    The main g breakfast:   (preferably < 110)  2 hours After meals: less than 180 (preferably less than 150)   Call for persistent blood sugars < 75 or > 200.    Blood sugars greater than 200 are not acceptable to reach your goal of improving diabetes    [facility]

## 2019-10-14 ENCOUNTER — PATIENT MESSAGE (OUTPATIENT)
Dept: ENDOCRINOLOGY CLINIC | Facility: CLINIC | Age: 39
End: 2019-10-14

## 2019-10-14 NOTE — TELEPHONE ENCOUNTER
LOV: 9/26/19  NOV: 10/24/19  ASSESSMENT AND PLAN:   As for his Diabetes, it is stable, no significant medication side effects noted, needs further observation.      Recommendations are: lose weight by increased dietary compliance and exercise and check fee

## 2019-10-14 NOTE — TELEPHONE ENCOUNTER
From: Melissa Hearing  To: RAINER Longo  Sent: 10/14/2019 8:57 AM CDT  Subject: Non-Urgent Medical Question    I finally went to .50 on victoza and noticed at night my blood sugar dips below 100 down to 70.  I was scared to put insulin the last 2 nig

## 2019-10-21 DIAGNOSIS — E11.29 UNCONTROLLED TYPE 2 DIABETES MELLITUS WITH MICROALBUMINURIA (HCC): ICD-10-CM

## 2019-10-21 DIAGNOSIS — R80.9 UNCONTROLLED TYPE 2 DIABETES MELLITUS WITH MICROALBUMINURIA (HCC): ICD-10-CM

## 2019-10-21 DIAGNOSIS — E11.65 UNCONTROLLED TYPE 2 DIABETES MELLITUS WITH MICROALBUMINURIA (HCC): ICD-10-CM

## 2019-10-21 RX ORDER — FLASH GLUCOSE SENSOR
KIT MISCELLANEOUS
Qty: 2 EACH | Refills: 0 | Status: SHIPPED | OUTPATIENT
Start: 2019-10-21 | End: 2019-11-22

## 2019-10-24 ENCOUNTER — OFFICE VISIT (OUTPATIENT)
Dept: ENDOCRINOLOGY CLINIC | Facility: CLINIC | Age: 39
End: 2019-10-24
Payer: COMMERCIAL

## 2019-10-24 VITALS
DIASTOLIC BLOOD PRESSURE: 70 MMHG | WEIGHT: 220 LBS | SYSTOLIC BLOOD PRESSURE: 100 MMHG | RESPIRATION RATE: 20 BRPM | HEART RATE: 98 BPM | HEIGHT: 67 IN | BODY MASS INDEX: 34.53 KG/M2

## 2019-10-24 DIAGNOSIS — E11.29 TYPE 2 DIABETES MELLITUS WITH MICROALBUMINURIA, WITH LONG-TERM CURRENT USE OF INSULIN (HCC): Primary | ICD-10-CM

## 2019-10-24 DIAGNOSIS — Z79.4 TYPE 2 DIABETES MELLITUS WITH MICROALBUMINURIA, WITH LONG-TERM CURRENT USE OF INSULIN (HCC): Primary | ICD-10-CM

## 2019-10-24 DIAGNOSIS — R80.9 TYPE 2 DIABETES MELLITUS WITH MICROALBUMINURIA, WITH LONG-TERM CURRENT USE OF INSULIN (HCC): Primary | ICD-10-CM

## 2019-10-24 PROCEDURE — 95251 CONT GLUC MNTR ANALYSIS I&R: CPT | Performed by: NURSE PRACTITIONER

## 2019-10-24 PROCEDURE — 99214 OFFICE O/P EST MOD 30 MIN: CPT | Performed by: NURSE PRACTITIONER

## 2019-10-24 RX ORDER — SEMAGLUTIDE 1.34 MG/ML
1 INJECTION, SOLUTION SUBCUTANEOUS
Qty: 2 PEN | Refills: 1 | Status: SHIPPED | OUTPATIENT
Start: 2019-10-24 | End: 2019-12-22

## 2019-10-24 RX ORDER — METFORMIN HYDROCHLORIDE 500 MG/1
500 TABLET, EXTENDED RELEASE ORAL 2 TIMES DAILY WITH MEALS
Qty: 180 TABLET | Refills: 0 | COMMUNITY
Start: 2019-10-24 | End: 2020-01-08

## 2019-10-24 RX ORDER — INSULIN GLARGINE 100 [IU]/ML
10 INJECTION, SOLUTION SUBCUTANEOUS NIGHTLY
Qty: 15 ML | Refills: 2 | COMMUNITY
Start: 2019-10-24 | End: 2020-03-06

## 2019-10-24 NOTE — PROGRESS NOTES
HPI:   Martha Burrell is a 42year old male who presents for recheck of his type 2 diabetes and medication evaluation. Patient tolerating Ozempic well, denies c/o nausea or stomach pains.   Patient does state that he has been experiencing some diarrhea and mother; heart failure in his father; respitory failure in his maternal grandfather. He  reports that he has never smoked. He has never used smokeless tobacco. He reports current alcohol use. He reports that he does not use drugs.      He is allergic t 07:45 AM    TRIG 140 07/31/2019 07:45 AM    HDL 39 (L) 07/31/2019 07:45 AM     (H) 07/31/2019 07:45 AM    NONHDLC 135 (H) 07/31/2019 07:45 AM          Diabetes  (most recent labs)   Lab Results   Component Value Date/Time    A1C 6.6 (A) 08/07/2019 0 Maintenance  Last dilated eye exam: Last Dilated Eye Exam: 01/23/19 Exam shows retinopathy?  Eye Exam shows Diabetic Retinopathy?: No  Last diabetic foot exam: Last Foot Exam: 02/14/19  Date of last PHQ-2 depression screen: PHQ-2 - Date of last depression s

## 2019-10-24 NOTE — PATIENT INSTRUCTIONS
We are here to support you with Diabetes but please remember that you still need your primary care doctor for your routine health maintenance. Your current A1C: 6.6%  This test provides us with your average blood sugar for the past 3 months.    The main g sugars < 75 or > 200. Blood sugars greater than 200 are not acceptable to reach your goal of improving diabetes    Health Maintenance:   1.  LABS: It is important to monitor your kidney function (blood and urine protein levels) , liver function tests and

## 2019-11-19 ENCOUNTER — OFFICE VISIT (OUTPATIENT)
Dept: FAMILY MEDICINE CLINIC | Facility: CLINIC | Age: 39
End: 2019-11-19
Payer: COMMERCIAL

## 2019-11-19 VITALS
OXYGEN SATURATION: 97 % | SYSTOLIC BLOOD PRESSURE: 126 MMHG | HEIGHT: 67 IN | HEART RATE: 136 BPM | DIASTOLIC BLOOD PRESSURE: 90 MMHG | BODY MASS INDEX: 33.9 KG/M2 | RESPIRATION RATE: 18 BRPM | TEMPERATURE: 102 F | WEIGHT: 216 LBS

## 2019-11-19 DIAGNOSIS — J03.90 EXUDATIVE TONSILLITIS: ICD-10-CM

## 2019-11-19 DIAGNOSIS — J02.9 SORE THROAT: Primary | ICD-10-CM

## 2019-11-19 PROCEDURE — 99213 OFFICE O/P EST LOW 20 MIN: CPT | Performed by: NURSE PRACTITIONER

## 2019-11-19 PROCEDURE — 87081 CULTURE SCREEN ONLY: CPT | Performed by: NURSE PRACTITIONER

## 2019-11-19 PROCEDURE — 86308 HETEROPHILE ANTIBODY SCREEN: CPT | Performed by: NURSE PRACTITIONER

## 2019-11-19 PROCEDURE — 87880 STREP A ASSAY W/OPTIC: CPT | Performed by: NURSE PRACTITIONER

## 2019-11-19 RX ORDER — AMOXICILLIN 875 MG/1
875 TABLET, COATED ORAL 2 TIMES DAILY
Qty: 20 TABLET | Refills: 0 | Status: SHIPPED | OUTPATIENT
Start: 2019-11-19 | End: 2019-11-29

## 2019-11-19 NOTE — PATIENT INSTRUCTIONS
Pharyngitis: Strep (Presumed)    You have pharyngitis (sore throat). The healthcare staff think your sore throat is caused by streptococcus (strep) bacteria. This is often called strep throat.  Strep throat can cause throat pain that is worse when swallow · Lymph nodes that get larger  · Can’t swallow liquids, a lot of drooling, or can’t open mouth wide due to throat pain  · Signs of dehydration, such as very dark urine or no urine, sunken eyes, dizziness  · Trouble breathing or noisy breathing  · Muffled v Over-the-counter medicine can reduce sore throat symptoms. Ask your pharmacist if you have questions about which medicine to use:  · Ease pain with anesthetic sprays. Aspirin or an aspirin substitute also helps.  Remember, never give aspirin to anyone 18 or

## 2019-11-19 NOTE — PROGRESS NOTES
CHIEF COMPLAINT:   Patient presents with:  Sore Throat: coughing up bloody mucus, 102.5 fever x yesterday       HPI:   Garfield Bocanegra is a 42year old male who presents for sore throat and fever starting yesterday.  Patient reports temp up to 102.5, also Social History    Tobacco Use      Smoking status: Never Smoker      Smokeless tobacco: Never Used    Alcohol use: Yes      Comment: 2-3 beers a week    Drug use: No        REVIEW OF SYSTEMS:   GENERAL: decreased appetite  SKIN: no rashes or abnormal sk Kit Expiration Date 01/31/21 Date       ASSESSMENT AND PLAN:   Chely Katz is a 42year old male who presents with upper respiratory symptoms that are consistent with    ASSESSMENT:   Sore throat  (primary encounter diagnosis)  Tonsillitis    PLAN: · Take the antibiotic medicine for the full 10 days, even when you feel better. This is very important to make sure the infection is fully treated.  It is also important to prevent medicine-resistant germs from growing. If you were given an antibiotic shot, © 4485-5142 [employer] [address] [place] [address], [place], [address] [place]. All rights reserved. This information is not intended as a substitute for professional medical care. Always follow your healthcare professional's instructions.         Self-Ca · Limit contact with pets and with allergy-causing substances, such as pollen and mold. · When you’re around someone with a sore throat or cold, wash your hands often to keep viruses or bacteria from spreading. · Don’t strain your vocal cords.   Contact y

## 2019-11-22 DIAGNOSIS — IMO0002 UNCONTROLLED TYPE 2 DIABETES MELLITUS WITH MICROALBUMINURIA: ICD-10-CM

## 2019-11-22 RX ORDER — FLASH GLUCOSE SENSOR
KIT MISCELLANEOUS
Qty: 2 EACH | Refills: 11 | Status: SHIPPED | OUTPATIENT
Start: 2019-11-22 | End: 2020-04-22

## 2019-12-21 DIAGNOSIS — E11.29 TYPE 2 DIABETES MELLITUS WITH MICROALBUMINURIA, WITH LONG-TERM CURRENT USE OF INSULIN (HCC): ICD-10-CM

## 2019-12-21 DIAGNOSIS — R80.9 TYPE 2 DIABETES MELLITUS WITH MICROALBUMINURIA, WITH LONG-TERM CURRENT USE OF INSULIN (HCC): ICD-10-CM

## 2019-12-21 DIAGNOSIS — Z79.4 TYPE 2 DIABETES MELLITUS WITH MICROALBUMINURIA, WITH LONG-TERM CURRENT USE OF INSULIN (HCC): ICD-10-CM

## 2019-12-22 RX ORDER — SEMAGLUTIDE 1.34 MG/ML
1 INJECTION, SOLUTION SUBCUTANEOUS
Qty: 3 ML | Refills: 0 | Status: SHIPPED | OUTPATIENT
Start: 2019-12-22 | End: 2020-01-23

## 2020-01-08 DIAGNOSIS — Z79.4 TYPE 2 DIABETES MELLITUS WITH MICROALBUMINURIA, WITH LONG-TERM CURRENT USE OF INSULIN (HCC): ICD-10-CM

## 2020-01-08 DIAGNOSIS — R80.9 TYPE 2 DIABETES MELLITUS WITH MICROALBUMINURIA, WITH LONG-TERM CURRENT USE OF INSULIN (HCC): ICD-10-CM

## 2020-01-08 DIAGNOSIS — E11.29 TYPE 2 DIABETES MELLITUS WITH MICROALBUMINURIA, WITH LONG-TERM CURRENT USE OF INSULIN (HCC): ICD-10-CM

## 2020-01-08 RX ORDER — METFORMIN HYDROCHLORIDE 500 MG/1
500 TABLET, EXTENDED RELEASE ORAL 2 TIMES DAILY WITH MEALS
Qty: 180 TABLET | Refills: 0 | Status: SHIPPED | OUTPATIENT
Start: 2020-01-08 | End: 2020-03-06

## 2020-01-18 RX ORDER — FLUTICASONE PROPIONATE 50 MCG
SPRAY, SUSPENSION (ML) NASAL
Qty: 16 G | Refills: 0 | OUTPATIENT
Start: 2020-01-18

## 2020-01-23 DIAGNOSIS — R80.9 TYPE 2 DIABETES MELLITUS WITH MICROALBUMINURIA, WITH LONG-TERM CURRENT USE OF INSULIN (HCC): ICD-10-CM

## 2020-01-23 DIAGNOSIS — E11.29 TYPE 2 DIABETES MELLITUS WITH MICROALBUMINURIA, WITH LONG-TERM CURRENT USE OF INSULIN (HCC): ICD-10-CM

## 2020-01-23 DIAGNOSIS — Z79.4 TYPE 2 DIABETES MELLITUS WITH MICROALBUMINURIA, WITH LONG-TERM CURRENT USE OF INSULIN (HCC): ICD-10-CM

## 2020-01-23 RX ORDER — SEMAGLUTIDE 1.34 MG/ML
1 INJECTION, SOLUTION SUBCUTANEOUS
Qty: 3 ML | Refills: 0 | Status: SHIPPED | OUTPATIENT
Start: 2020-01-23 | End: 2020-03-06

## 2020-02-08 ENCOUNTER — TELEPHONE (OUTPATIENT)
Dept: FAMILY MEDICINE CLINIC | Facility: CLINIC | Age: 40
End: 2020-02-08

## 2020-02-10 ENCOUNTER — MED REC SCAN ONLY (OUTPATIENT)
Dept: FAMILY MEDICINE CLINIC | Facility: CLINIC | Age: 40
End: 2020-02-10

## 2020-02-15 DIAGNOSIS — E11.29 TYPE 2 DIABETES MELLITUS WITH MICROALBUMINURIA, WITH LONG-TERM CURRENT USE OF INSULIN (HCC): ICD-10-CM

## 2020-02-15 DIAGNOSIS — Z79.4 TYPE 2 DIABETES MELLITUS WITH MICROALBUMINURIA, WITH LONG-TERM CURRENT USE OF INSULIN (HCC): ICD-10-CM

## 2020-02-15 DIAGNOSIS — R80.9 TYPE 2 DIABETES MELLITUS WITH MICROALBUMINURIA, WITH LONG-TERM CURRENT USE OF INSULIN (HCC): ICD-10-CM

## 2020-02-17 RX ORDER — METFORMIN HYDROCHLORIDE 500 MG/1
TABLET, EXTENDED RELEASE ORAL
Qty: 180 TABLET | Refills: 0 | OUTPATIENT
Start: 2020-02-17

## 2020-02-18 ENCOUNTER — TELEPHONE (OUTPATIENT)
Dept: ENDOCRINOLOGY CLINIC | Facility: CLINIC | Age: 40
End: 2020-02-18

## 2020-03-02 DIAGNOSIS — E11.29 TYPE 2 DIABETES MELLITUS WITH MICROALBUMINURIA, WITH LONG-TERM CURRENT USE OF INSULIN (HCC): ICD-10-CM

## 2020-03-02 DIAGNOSIS — R80.9 TYPE 2 DIABETES MELLITUS WITH MICROALBUMINURIA, WITH LONG-TERM CURRENT USE OF INSULIN (HCC): ICD-10-CM

## 2020-03-02 DIAGNOSIS — Z79.4 TYPE 2 DIABETES MELLITUS WITH MICROALBUMINURIA, WITH LONG-TERM CURRENT USE OF INSULIN (HCC): ICD-10-CM

## 2020-03-03 RX ORDER — METFORMIN HYDROCHLORIDE 500 MG/1
TABLET, EXTENDED RELEASE ORAL
Qty: 180 TABLET | Refills: 0 | OUTPATIENT
Start: 2020-03-03

## 2020-03-06 ENCOUNTER — OFFICE VISIT (OUTPATIENT)
Dept: ENDOCRINOLOGY CLINIC | Facility: CLINIC | Age: 40
End: 2020-03-06
Payer: COMMERCIAL

## 2020-03-06 VITALS
SYSTOLIC BLOOD PRESSURE: 110 MMHG | HEIGHT: 67 IN | WEIGHT: 218 LBS | HEART RATE: 71 BPM | BODY MASS INDEX: 34.21 KG/M2 | DIASTOLIC BLOOD PRESSURE: 70 MMHG | RESPIRATION RATE: 18 BRPM

## 2020-03-06 DIAGNOSIS — E11.29 TYPE 2 DIABETES MELLITUS WITH MICROALBUMINURIA, WITH LONG-TERM CURRENT USE OF INSULIN (HCC): Primary | ICD-10-CM

## 2020-03-06 DIAGNOSIS — Z79.4 TYPE 2 DIABETES MELLITUS WITH MICROALBUMINURIA, WITH LONG-TERM CURRENT USE OF INSULIN (HCC): Primary | ICD-10-CM

## 2020-03-06 DIAGNOSIS — E78.5 HYPERLIPIDEMIA ASSOCIATED WITH TYPE 2 DIABETES MELLITUS (HCC): ICD-10-CM

## 2020-03-06 DIAGNOSIS — E11.69 HYPERLIPIDEMIA ASSOCIATED WITH TYPE 2 DIABETES MELLITUS (HCC): ICD-10-CM

## 2020-03-06 DIAGNOSIS — R80.9 TYPE 2 DIABETES MELLITUS WITH MICROALBUMINURIA, WITH LONG-TERM CURRENT USE OF INSULIN (HCC): Primary | ICD-10-CM

## 2020-03-06 LAB
CARTRIDGE LOT#: 588 NUMERIC
HEMOGLOBIN A1C: 6.9 % (ref 4.3–5.6)

## 2020-03-06 PROCEDURE — 95251 CONT GLUC MNTR ANALYSIS I&R: CPT | Performed by: NURSE PRACTITIONER

## 2020-03-06 PROCEDURE — 83036 HEMOGLOBIN GLYCOSYLATED A1C: CPT | Performed by: NURSE PRACTITIONER

## 2020-03-06 PROCEDURE — 99214 OFFICE O/P EST MOD 30 MIN: CPT | Performed by: NURSE PRACTITIONER

## 2020-03-06 RX ORDER — METFORMIN HYDROCHLORIDE 500 MG/1
1000 TABLET, EXTENDED RELEASE ORAL 2 TIMES DAILY WITH MEALS
Qty: 360 TABLET | Refills: 1 | Status: SHIPPED | OUTPATIENT
Start: 2020-03-06 | End: 2020-04-06

## 2020-03-06 NOTE — PATIENT INSTRUCTIONS
We are here to support you with Diabetes but please remember that you still need your primary care doctor for your routine health maintenance. Your current A1C: 6.9%  This test provides us with your average blood sugar for the past 3 months.    The main g reach your goal of improving diabetes    Health Maintenance:   1. LABS: It is important to monitor your kidney function (blood and urine protein levels) , liver function tests and cholesterol levels when you have diabetes.      2. FOOT EXAMS:  daily foot in

## 2020-03-06 NOTE — PROGRESS NOTES
HPI:   Martha Burrell is a 42year old male who presents for recheck of his type 2 diabetes. Patient stopped Ozempic due to change in work schedule but is willing to restart.     Patient presents with:  Diabetes: follow up     Diabetes: stable  Type: 2 grandmother; Heart Disease in his father; diabetes mellitus in his father and mother; heart failure in his father; respitory failure in his maternal grandfather. He  reports that he has never smoked.  He has never used smokeless tobacco. He reports cassandra reviewed with patient.     REVIEW OF SYSTEMS:   GENERAL HEALTH: feels well otherwise  SKIN: denies any unusual skin lesions or rashes  RESPIRATORY: denies shortness of breath with exertion  CARDIOVASCULAR: denies chest pain on exertion  GI: denies abdominal 0.25mg SC weekly. Patient to continue to use personal Freestyle Angela CGM for glucose monitoring. As for his hypertension, Blood Pressure is stable.    PLAN: reviewed diet, exercise and weight control, and labs as ordered   Discussed ACEI/ARB therapy (around 4/10/2020) for 45 minute appointment, Personal CGM. The risks and benefits of my recommendations, as well as other treatment options were discussed with the patient today. questions were answered to the best of my knowledge.    30 min spent with

## 2020-03-30 ENCOUNTER — TELEPHONE (OUTPATIENT)
Dept: ENDOCRINOLOGY CLINIC | Facility: CLINIC | Age: 40
End: 2020-03-30

## 2020-03-30 NOTE — TELEPHONE ENCOUNTER
Please reschedule patients Diabetes office visit on 4/9/2020 to Virtual/telephone visit due to risk and concerns of COVID-19. Thank you.

## 2020-04-05 DIAGNOSIS — R80.9 TYPE 2 DIABETES MELLITUS WITH MICROALBUMINURIA, WITH LONG-TERM CURRENT USE OF INSULIN (HCC): ICD-10-CM

## 2020-04-05 DIAGNOSIS — Z79.4 TYPE 2 DIABETES MELLITUS WITH MICROALBUMINURIA, WITH LONG-TERM CURRENT USE OF INSULIN (HCC): ICD-10-CM

## 2020-04-05 DIAGNOSIS — E11.29 TYPE 2 DIABETES MELLITUS WITH MICROALBUMINURIA, WITH LONG-TERM CURRENT USE OF INSULIN (HCC): ICD-10-CM

## 2020-04-06 RX ORDER — METFORMIN HYDROCHLORIDE 500 MG/1
TABLET, EXTENDED RELEASE ORAL
Qty: 180 TABLET | Refills: 0 | Status: SHIPPED | OUTPATIENT
Start: 2020-04-06 | End: 2020-05-07 | Stop reason: ALTCHOICE

## 2020-04-08 ENCOUNTER — MED REC SCAN ONLY (OUTPATIENT)
Dept: ENDOCRINOLOGY CLINIC | Facility: CLINIC | Age: 40
End: 2020-04-08

## 2020-04-09 ENCOUNTER — VIRTUAL PHONE E/M (OUTPATIENT)
Dept: ENDOCRINOLOGY CLINIC | Facility: CLINIC | Age: 40
End: 2020-04-09
Payer: COMMERCIAL

## 2020-04-09 DIAGNOSIS — E11.29 TYPE 2 DIABETES MELLITUS WITH MICROALBUMINURIA, WITH LONG-TERM CURRENT USE OF INSULIN (HCC): Primary | ICD-10-CM

## 2020-04-09 DIAGNOSIS — R80.9 TYPE 2 DIABETES MELLITUS WITH MICROALBUMINURIA, WITH LONG-TERM CURRENT USE OF INSULIN (HCC): Primary | ICD-10-CM

## 2020-04-09 DIAGNOSIS — Z79.4 TYPE 2 DIABETES MELLITUS WITH MICROALBUMINURIA, WITH LONG-TERM CURRENT USE OF INSULIN (HCC): Primary | ICD-10-CM

## 2020-04-09 PROCEDURE — 95251 CONT GLUC MNTR ANALYSIS I&R: CPT | Performed by: NURSE PRACTITIONER

## 2020-04-09 PROCEDURE — 99213 OFFICE O/P EST LOW 20 MIN: CPT | Performed by: NURSE PRACTITIONER

## 2020-04-09 RX ORDER — LISINOPRIL 2.5 MG/1
2.5 TABLET ORAL DAILY
Qty: 30 TABLET | Refills: 1 | Status: SHIPPED | OUTPATIENT
Start: 2020-04-09 | End: 2020-11-06

## 2020-04-09 NOTE — PROGRESS NOTES
Virtual Telephone Check-In    Edith Jaramillo verbally consents to a Virtual/Telephone Check-In visit on 04/09/20. Patient understands and accepts financial responsibility for any deductible, co-insurance and/or co-pays associated with this service.

## 2020-04-22 DIAGNOSIS — E11.29 UNCONTROLLED TYPE 2 DIABETES MELLITUS WITH MICROALBUMINURIA (HCC): ICD-10-CM

## 2020-04-22 DIAGNOSIS — R80.9 UNCONTROLLED TYPE 2 DIABETES MELLITUS WITH MICROALBUMINURIA (HCC): ICD-10-CM

## 2020-04-22 DIAGNOSIS — E11.65 UNCONTROLLED TYPE 2 DIABETES MELLITUS WITH MICROALBUMINURIA (HCC): ICD-10-CM

## 2020-04-22 RX ORDER — FLASH GLUCOSE SENSOR
KIT MISCELLANEOUS
Qty: 2 EACH | Refills: 11 | Status: SHIPPED | OUTPATIENT
Start: 2020-04-22 | End: 2020-06-30

## 2020-04-25 DIAGNOSIS — Z79.4 TYPE 2 DIABETES MELLITUS WITH MICROALBUMINURIA, WITH LONG-TERM CURRENT USE OF INSULIN (HCC): ICD-10-CM

## 2020-04-25 DIAGNOSIS — R80.9 TYPE 2 DIABETES MELLITUS WITH MICROALBUMINURIA, WITH LONG-TERM CURRENT USE OF INSULIN (HCC): ICD-10-CM

## 2020-04-25 DIAGNOSIS — E11.29 TYPE 2 DIABETES MELLITUS WITH MICROALBUMINURIA, WITH LONG-TERM CURRENT USE OF INSULIN (HCC): ICD-10-CM

## 2020-04-27 RX ORDER — METFORMIN HYDROCHLORIDE 500 MG/1
TABLET, EXTENDED RELEASE ORAL
Qty: 180 TABLET | Refills: 0 | OUTPATIENT
Start: 2020-04-27

## 2020-05-07 ENCOUNTER — VIRTUAL PHONE E/M (OUTPATIENT)
Dept: ENDOCRINOLOGY CLINIC | Facility: CLINIC | Age: 40
End: 2020-05-07
Payer: COMMERCIAL

## 2020-05-07 ENCOUNTER — MED REC SCAN ONLY (OUTPATIENT)
Dept: ENDOCRINOLOGY CLINIC | Facility: CLINIC | Age: 40
End: 2020-05-07

## 2020-05-07 DIAGNOSIS — R80.9 TYPE 2 DIABETES MELLITUS WITH MICROALBUMINURIA, WITH LONG-TERM CURRENT USE OF INSULIN (HCC): Primary | ICD-10-CM

## 2020-05-07 DIAGNOSIS — Z79.4 TYPE 2 DIABETES MELLITUS WITH MICROALBUMINURIA, WITH LONG-TERM CURRENT USE OF INSULIN (HCC): Primary | ICD-10-CM

## 2020-05-07 DIAGNOSIS — E11.29 TYPE 2 DIABETES MELLITUS WITH MICROALBUMINURIA, WITH LONG-TERM CURRENT USE OF INSULIN (HCC): Primary | ICD-10-CM

## 2020-05-07 PROCEDURE — 99214 OFFICE O/P EST MOD 30 MIN: CPT | Performed by: NURSE PRACTITIONER

## 2020-05-07 PROCEDURE — 95251 CONT GLUC MNTR ANALYSIS I&R: CPT | Performed by: NURSE PRACTITIONER

## 2020-05-07 NOTE — PROGRESS NOTES
HPI:   Paul Marin is a 42year old male for his management of diabetes. This visit is conducted using Telemedicine with live, interactive audio due to COVID-19. Patient verbally consents to Telemedicine visit.   Patient understands and accepts fin He reports current alcohol use. He reports that he does not use drugs.      He is allergic to allergy; grass; mold; pollen; and seasonal.   Continuous Blood Gluc Sensor (FREESTYLE DANILO 14 DAY SENSOR) Does not apply Misc, APPLY 1 SENSOR AS DIRECTED AND VELAZQUEZ Pulmonary/Chest:   Able to speak in complete sentences without difficulty   Neurological: He is alert and oriented to person, place, and time. Psychiatric: He has a normal mood and affect.  His behavior is normal. Judgment normal.       ASSESSMENT AND P risks and benefits of my recommendations, as well as other treatment options were discussed with the patient today. questions were answered to the best of my knowledge. Patient verbalizes understanding and amendable to plan of care.   Duration of this serv

## 2020-06-01 RX ORDER — METFORMIN HYDROCHLORIDE 500 MG/1
TABLET, EXTENDED RELEASE ORAL
Qty: 180 TABLET | Refills: 0 | Status: SHIPPED | OUTPATIENT
Start: 2020-06-01 | End: 2020-06-02 | Stop reason: ALTCHOICE

## 2020-06-01 NOTE — TELEPHONE ENCOUNTER
Medication(s) to Refill:   Requested Prescriptions     Pending Prescriptions Disp Refills   • METFORMIN HCL  MG Oral Tablet 24 Hr [Pharmacy Med Name: METFORMIN ER 500MG TABLET] 180 tablet 0     Sig: TAKE 1 TABLET BY MOUTH  TWICE DAILY WITH MEALS

## 2020-06-02 ENCOUNTER — VIRTUAL PHONE E/M (OUTPATIENT)
Dept: ENDOCRINOLOGY CLINIC | Facility: CLINIC | Age: 40
End: 2020-06-02
Payer: COMMERCIAL

## 2020-06-02 DIAGNOSIS — E11.29 TYPE 2 DIABETES MELLITUS WITH MICROALBUMINURIA, WITH LONG-TERM CURRENT USE OF INSULIN (HCC): Primary | ICD-10-CM

## 2020-06-02 DIAGNOSIS — Z79.4 TYPE 2 DIABETES MELLITUS WITH MICROALBUMINURIA, WITH LONG-TERM CURRENT USE OF INSULIN (HCC): Primary | ICD-10-CM

## 2020-06-02 DIAGNOSIS — R80.9 TYPE 2 DIABETES MELLITUS WITH MICROALBUMINURIA, WITH LONG-TERM CURRENT USE OF INSULIN (HCC): Primary | ICD-10-CM

## 2020-06-02 PROCEDURE — 95251 CONT GLUC MNTR ANALYSIS I&R: CPT | Performed by: NURSE PRACTITIONER

## 2020-06-02 PROCEDURE — G2012 BRIEF CHECK IN BY MD/QHP: HCPCS | Performed by: NURSE PRACTITIONER

## 2020-06-02 NOTE — PROGRESS NOTES
HPI:   Martha Burrell is a 42year old male for follow up of his management of diabetes. This visit is conducted using Telemedicine with live, interactive audio due to COVID-19. Patient verbally consents to Telemedicine visit.   Patient understands an tobacco. He reports current alcohol use. He reports that he does not use drugs.      He is allergic to allergy; grass; mold; pollen; and seasonal.   [DISCONTINUED] Semaglutide, 1 MG/DOSE, (OZEMPIC, 1 MG/DOSE,) 2 MG/1.5ML Subcutaneous Solution Pen-injector, denies abdominal pain and denies heartburn  NEURO: denies headaches     EXAM:   Physical Exam    Constitutional: He is oriented to person, place, and time.    Pulmonary/Chest:   Able to speak in complete sentences without difficulty   Neurological: He is al Solution Pen-injector; Inject 1 mg into the skin every 7 days.  -     HEMOGLOBIN A1C  -     MICROALB/CREAT RATIO, RANDOM URINE  -     GLUC MNTR CONT REC FROM NTRSTL TISS FLU PHYS I&R       Return in about 3 months (around 9/2/2020) for Telehealth.     The r

## 2020-06-03 ENCOUNTER — MED REC SCAN ONLY (OUTPATIENT)
Dept: ENDOCRINOLOGY CLINIC | Facility: CLINIC | Age: 40
End: 2020-06-03

## 2020-06-16 ENCOUNTER — PATIENT MESSAGE (OUTPATIENT)
Dept: ENDOCRINOLOGY CLINIC | Facility: CLINIC | Age: 40
End: 2020-06-16

## 2020-06-16 NOTE — TELEPHONE ENCOUNTER
From: Martha Burrell  To: RAINER Peters  Sent: 6/16/2020 6:47 AM CDT  Subject: Visit Follow-up Question    My blood sugars have been near uncontrollable these past few days. Should I get back on metformin?  It seems anything I eat raises it and keeps

## 2020-06-30 DIAGNOSIS — R80.9 UNCONTROLLED TYPE 2 DIABETES MELLITUS WITH MICROALBUMINURIA (HCC): ICD-10-CM

## 2020-06-30 DIAGNOSIS — E11.65 UNCONTROLLED TYPE 2 DIABETES MELLITUS WITH MICROALBUMINURIA (HCC): ICD-10-CM

## 2020-06-30 DIAGNOSIS — E11.29 UNCONTROLLED TYPE 2 DIABETES MELLITUS WITH MICROALBUMINURIA (HCC): ICD-10-CM

## 2020-06-30 RX ORDER — FLASH GLUCOSE SENSOR
KIT MISCELLANEOUS
Qty: 2 EACH | Refills: 11 | Status: SHIPPED | OUTPATIENT
Start: 2020-06-30 | End: 2021-08-21

## 2020-07-03 LAB — HEMOGLOBIN A1C: 7.5 % OF TOTAL HGB

## 2020-07-21 DIAGNOSIS — R80.9 TYPE 2 DIABETES MELLITUS WITH MICROALBUMINURIA, WITH LONG-TERM CURRENT USE OF INSULIN (HCC): ICD-10-CM

## 2020-07-21 DIAGNOSIS — E11.29 TYPE 2 DIABETES MELLITUS WITH MICROALBUMINURIA, WITH LONG-TERM CURRENT USE OF INSULIN (HCC): ICD-10-CM

## 2020-07-21 DIAGNOSIS — Z79.4 TYPE 2 DIABETES MELLITUS WITH MICROALBUMINURIA, WITH LONG-TERM CURRENT USE OF INSULIN (HCC): ICD-10-CM

## 2020-08-09 ENCOUNTER — PATIENT MESSAGE (OUTPATIENT)
Dept: ENDOCRINOLOGY CLINIC | Facility: CLINIC | Age: 40
End: 2020-08-09

## 2020-08-10 ENCOUNTER — TELEPHONE (OUTPATIENT)
Dept: ENDOCRINOLOGY CLINIC | Facility: CLINIC | Age: 40
End: 2020-08-10

## 2020-08-10 RX ORDER — METFORMIN HYDROCHLORIDE 500 MG/1
TABLET, EXTENDED RELEASE ORAL
Qty: 180 TABLET | Refills: 3 | Status: SHIPPED | OUTPATIENT
Start: 2020-08-10 | End: 2020-08-19

## 2020-08-10 NOTE — TELEPHONE ENCOUNTER
From: Susu Chambers  To: RAINER Alvarado  Sent: 8/9/2020 12:21 PM CDT  Subject: Visit Follow-up Question    I feel as if the 2 metformin a day isn't helping.  I've been trying to keep a food diary of what I'm eating and it seems any amount of carbs in

## 2020-08-10 NOTE — TELEPHONE ENCOUNTER
Personal Freestyle Angela CGM data reviewed.   Analysis of data:  Sensor download: full report  in media  Average glucose : 168 mg/dl     CV (coefficient of variation) : 17.7%     28% time above 180mg /dl   1% time above 250 mg/dl  71% time in target range:

## 2020-08-10 NOTE — TELEPHONE ENCOUNTER
Pt. Contacted regarding medication instructions:instructed to continue Ozempic 1 mg weekly & increase Metformin ER to 1000mg 1 tab twice daily, then week 1 increase Metformin ER to 2 tabs qam & 1 tab qpm,& week 2 , 2 tabs qam & qpm.   Reminded him to todd

## 2020-08-11 ENCOUNTER — MED REC SCAN ONLY (OUTPATIENT)
Dept: ENDOCRINOLOGY CLINIC | Facility: CLINIC | Age: 40
End: 2020-08-11

## 2020-08-19 ENCOUNTER — PATIENT MESSAGE (OUTPATIENT)
Dept: ENDOCRINOLOGY CLINIC | Facility: CLINIC | Age: 40
End: 2020-08-19

## 2020-08-19 DIAGNOSIS — Z79.4 TYPE 2 DIABETES MELLITUS WITH MICROALBUMINURIA, WITH LONG-TERM CURRENT USE OF INSULIN (HCC): Primary | ICD-10-CM

## 2020-08-19 DIAGNOSIS — E11.29 TYPE 2 DIABETES MELLITUS WITH MICROALBUMINURIA, WITH LONG-TERM CURRENT USE OF INSULIN (HCC): Primary | ICD-10-CM

## 2020-08-19 DIAGNOSIS — R80.9 TYPE 2 DIABETES MELLITUS WITH MICROALBUMINURIA, WITH LONG-TERM CURRENT USE OF INSULIN (HCC): Primary | ICD-10-CM

## 2020-08-19 RX ORDER — METFORMIN HYDROCHLORIDE EXTENDED-RELEASE TABLETS 1000 MG/1
1000 TABLET, FILM COATED, EXTENDED RELEASE ORAL
Refills: 0 | Status: CANCELLED | OUTPATIENT
Start: 2020-08-19

## 2020-08-19 RX ORDER — METFORMIN HYDROCHLORIDE 500 MG/1
1000 TABLET, EXTENDED RELEASE ORAL 2 TIMES DAILY WITH MEALS
Qty: 360 TABLET | Refills: 1 | Status: SHIPPED | OUTPATIENT
Start: 2020-08-19 | End: 2020-11-06

## 2020-08-19 NOTE — TELEPHONE ENCOUNTER
From: Tessa Amaro  To: RAINER Meeks  Sent: 8/19/2020 9:40 AM CDT  Subject: Prescription Question    I know I was told to up my metformin. But I'm running out. Has my prescription been updated to reflect the higher need?  It goes to my mail in pro

## 2020-09-10 ENCOUNTER — TELEMEDICINE (OUTPATIENT)
Dept: ENDOCRINOLOGY CLINIC | Facility: CLINIC | Age: 40
End: 2020-09-10
Payer: COMMERCIAL

## 2020-09-10 DIAGNOSIS — Z79.4 TYPE 2 DIABETES MELLITUS WITH MICROALBUMINURIA, WITH LONG-TERM CURRENT USE OF INSULIN (HCC): Primary | ICD-10-CM

## 2020-09-10 DIAGNOSIS — R80.9 TYPE 2 DIABETES MELLITUS WITH MICROALBUMINURIA, WITH LONG-TERM CURRENT USE OF INSULIN (HCC): Primary | ICD-10-CM

## 2020-09-10 DIAGNOSIS — E78.5 HYPERLIPIDEMIA ASSOCIATED WITH TYPE 2 DIABETES MELLITUS (HCC): ICD-10-CM

## 2020-09-10 DIAGNOSIS — E11.69 HYPERLIPIDEMIA ASSOCIATED WITH TYPE 2 DIABETES MELLITUS (HCC): ICD-10-CM

## 2020-09-10 DIAGNOSIS — E11.29 TYPE 2 DIABETES MELLITUS WITH MICROALBUMINURIA, WITH LONG-TERM CURRENT USE OF INSULIN (HCC): Primary | ICD-10-CM

## 2020-09-10 PROCEDURE — 95251 CONT GLUC MNTR ANALYSIS I&R: CPT | Performed by: NURSE PRACTITIONER

## 2020-09-10 PROCEDURE — 99213 OFFICE O/P EST LOW 20 MIN: CPT | Performed by: NURSE PRACTITIONER

## 2020-09-10 NOTE — PROGRESS NOTES
HPI:   Michele Mcnulty is a 47year old male for his management of diabetes. This visit is conducted using Telemedicine with live, interactive video and audio using D[employer] due to COVID-19. Patient verbally consents to Telemedicine visit.   Patient un Heart Attack in his paternal grandmother; Heart Disease in his father; diabetes mellitus in his father and mother; heart failure in his father; respitory failure in his maternal grandfather. He  reports that he has never smoked.  He has never used smokele MICROALBUMIN 17.3 04/01/2020 08:12 AM    MICROALBCREA 115.6 (H) 07/31/2019 07:45 AM        Lab results reviewed.     REVIEW OF SYSTEMS:   GENERAL HEALTH: feels well otherwise  SKIN: denies any unusual skin lesions or rashes  RESPIRATORY: denies shortness of plan.  Refills addressed at time of office visit.     Diagnoses and all orders for this visit:    Type 2 diabetes mellitus with microalbuminuria, with long-term current use of insulin (HCC)  -     COMP METABOLIC PANEL (14)  -     HEMOGLOBIN A1C  -     MICRO

## 2020-09-15 ENCOUNTER — MED REC SCAN ONLY (OUTPATIENT)
Dept: ENDOCRINOLOGY CLINIC | Facility: CLINIC | Age: 40
End: 2020-09-15

## 2020-09-29 ENCOUNTER — PATIENT MESSAGE (OUTPATIENT)
Dept: ENDOCRINOLOGY CLINIC | Facility: CLINIC | Age: 40
End: 2020-09-29

## 2020-09-29 NOTE — TELEPHONE ENCOUNTER
From: Martha Burrell  To: RAINER Peters  Sent: 9/29/2020 6:04 AM CDT  Subject: Non-Urgent Medical Question    I am noticing slight stomach discomfort while I'm in the \"low end\" of my blood sugar numbers (usually around ) is this normal? It f

## 2020-10-06 ENCOUNTER — E-VISIT (OUTPATIENT)
Dept: TELEHEALTH | Age: 40
End: 2020-10-06

## 2020-10-06 DIAGNOSIS — Z20.822 ENCOUNTER BY TELEHEALTH FOR SUSPECTED COVID-19: Primary | ICD-10-CM

## 2020-10-06 PROCEDURE — 99421 OL DIG E/M SVC 5-10 MIN: CPT | Performed by: NURSE PRACTITIONER

## 2020-10-06 RX ORDER — AZITHROMYCIN 250 MG/1
TABLET, FILM COATED ORAL
Qty: 6 TABLET | Refills: 0 | Status: SHIPPED | OUTPATIENT
Start: 2020-10-06 | End: 2020-10-11

## 2020-10-06 NOTE — PROGRESS NOTES
Refer to patient Questionnaire and responses. See MyChart messages. 10 minutes spent in direct communication with patient via [facility].

## 2020-10-16 ENCOUNTER — PATIENT MESSAGE (OUTPATIENT)
Dept: ENDOCRINOLOGY CLINIC | Facility: CLINIC | Age: 40
End: 2020-10-16

## 2020-10-16 ENCOUNTER — TELEPHONE (OUTPATIENT)
Dept: ENDOCRINOLOGY CLINIC | Facility: CLINIC | Age: 40
End: 2020-10-16

## 2020-10-16 NOTE — TELEPHONE ENCOUNTER
From: Raudel Class  To: RAINER Cain  Sent: 10/16/2020 7:54 AM CDT  Subject: Prescription Question    Hello. My insurance is asking for a prior authorization on ozempic as they believe I should be trying therapy instead.  Is there any way you can

## 2020-10-23 ENCOUNTER — LAB ENCOUNTER (OUTPATIENT)
Dept: LAB | Age: 40
End: 2020-10-23
Attending: NURSE PRACTITIONER
Payer: COMMERCIAL

## 2020-10-23 DIAGNOSIS — Z20.822 ENCOUNTER BY TELEHEALTH FOR SUSPECTED COVID-19: ICD-10-CM

## 2020-10-23 PROCEDURE — 83036 HEMOGLOBIN GLYCOSYLATED A1C: CPT | Performed by: NURSE PRACTITIONER

## 2020-10-23 PROCEDURE — 36415 COLL VENOUS BLD VENIPUNCTURE: CPT | Performed by: NURSE PRACTITIONER

## 2020-10-24 ENCOUNTER — PATIENT MESSAGE (OUTPATIENT)
Dept: ENDOCRINOLOGY CLINIC | Facility: CLINIC | Age: 40
End: 2020-10-24

## 2020-10-25 NOTE — TELEPHONE ENCOUNTER
From: Chely Katz  To: RAINER Jovel  Sent: 10/24/2020 11:49 AM CDT  Subject: Non-Urgent Medical Question    I'm not sure if they did the right labs for me.  It was a confusing day as they weren't expecting me even though I made an appointment and

## 2020-10-26 ENCOUNTER — PATIENT MESSAGE (OUTPATIENT)
Dept: ENDOCRINOLOGY CLINIC | Facility: CLINIC | Age: 40
End: 2020-10-26

## 2020-10-26 ENCOUNTER — PATIENT MESSAGE (OUTPATIENT)
Dept: FAMILY MEDICINE CLINIC | Facility: CLINIC | Age: 40
End: 2020-10-26

## 2020-10-26 NOTE — TELEPHONE ENCOUNTER
From: Ashley [address]  To: RAINER Agarwal  Sent: 10/26/2020 8:51 AM CDT  Subject: Test Results Question    I have tested positive for covid so I will see what their rules are for the rest of my labs.  I will try to take them before our next meeting on

## 2020-10-26 NOTE — TELEPHONE ENCOUNTER
From: Jean-Paul Harvey  To: Adrián Hernandez MD  Sent: 10/26/2020 8:50 AM CDT  Subject: Test Results Question    Hello and good morning! I have tested positive for covid. What comes next? I do have my sense of smell back, but I'm having breathing issues.  Not

## 2020-10-29 ENCOUNTER — MED REC SCAN ONLY (OUTPATIENT)
Dept: ENDOCRINOLOGY CLINIC | Facility: CLINIC | Age: 40
End: 2020-10-29

## 2020-11-06 ENCOUNTER — VIRTUAL PHONE E/M (OUTPATIENT)
Dept: ENDOCRINOLOGY CLINIC | Facility: CLINIC | Age: 40
End: 2020-11-06
Payer: COMMERCIAL

## 2020-11-06 ENCOUNTER — MED REC SCAN ONLY (OUTPATIENT)
Dept: ENDOCRINOLOGY CLINIC | Facility: CLINIC | Age: 40
End: 2020-11-06

## 2020-11-06 DIAGNOSIS — E78.5 HYPERLIPIDEMIA ASSOCIATED WITH TYPE 2 DIABETES MELLITUS (HCC): ICD-10-CM

## 2020-11-06 DIAGNOSIS — R80.9 TYPE 2 DIABETES MELLITUS WITH MICROALBUMINURIA, WITHOUT LONG-TERM CURRENT USE OF INSULIN (HCC): Primary | ICD-10-CM

## 2020-11-06 DIAGNOSIS — E11.29 TYPE 2 DIABETES MELLITUS WITH MICROALBUMINURIA, WITHOUT LONG-TERM CURRENT USE OF INSULIN (HCC): Primary | ICD-10-CM

## 2020-11-06 DIAGNOSIS — E11.69 HYPERLIPIDEMIA ASSOCIATED WITH TYPE 2 DIABETES MELLITUS (HCC): ICD-10-CM

## 2020-11-06 PROCEDURE — 95251 CONT GLUC MNTR ANALYSIS I&R: CPT | Performed by: NURSE PRACTITIONER

## 2020-11-06 PROCEDURE — 99213 OFFICE O/P EST LOW 20 MIN: CPT | Performed by: NURSE PRACTITIONER

## 2020-11-06 RX ORDER — METFORMIN HYDROCHLORIDE 500 MG/1
1000 TABLET, EXTENDED RELEASE ORAL 2 TIMES DAILY WITH MEALS
Qty: 360 TABLET | Refills: 1 | Status: SHIPPED | OUTPATIENT
Start: 2020-11-06 | End: 2021-02-15

## 2020-11-06 RX ORDER — LISINOPRIL 2.5 MG/1
2.5 TABLET ORAL DAILY
Qty: 90 TABLET | Refills: 1 | Status: SHIPPED | OUTPATIENT
Start: 2020-11-06 | End: 2021-02-04

## 2020-11-06 NOTE — PROGRESS NOTES
HPI:   Bere Berg is a 47year old male for follow up for his management of diabetes. This visit is conducted using Telemedicine with live, interactive audio due to COVID-19. Patient verbally consents to Telemedicine visit.   Patient understands a Attack in his paternal grandmother; Heart Disease in his father; diabetes mellitus in his father and mother; heart failure in his father; respitory failure in his maternal grandfather. He  reports that he has never smoked.  He has never used smokeless tob Results   Component Value Date/Time    MICROALBUMIN 17.3 04/01/2020 08:12 AM    MICROALBCREA 115.6 (H) 07/31/2019 07:45 AM        Lab results reviewed.     REVIEW OF SYSTEMS:   SKIN: denies any unusual skin lesions or rashes  RESPIRATORY: c/o shortness of b plan.  Refills addressed at time of visit.     Diagnoses and all orders for this visit:    Type 2 diabetes mellitus with microalbuminuria, without long-term current use of insulin (HCC)  -     GLUC MNTR CONT REC FROM NTRSTL TISS FLU PHYS I&R  -     lisinopr

## 2020-11-16 DIAGNOSIS — Z79.4 TYPE 2 DIABETES MELLITUS WITH MICROALBUMINURIA, WITH LONG-TERM CURRENT USE OF INSULIN (HCC): ICD-10-CM

## 2020-11-16 DIAGNOSIS — R80.9 TYPE 2 DIABETES MELLITUS WITH MICROALBUMINURIA, WITH LONG-TERM CURRENT USE OF INSULIN (HCC): ICD-10-CM

## 2020-11-16 DIAGNOSIS — E11.29 TYPE 2 DIABETES MELLITUS WITH MICROALBUMINURIA, WITH LONG-TERM CURRENT USE OF INSULIN (HCC): ICD-10-CM

## 2020-11-16 RX ORDER — SEMAGLUTIDE 1.34 MG/ML
1 INJECTION, SOLUTION SUBCUTANEOUS
Qty: 6 PEN | Refills: 1 | Status: SHIPPED | OUTPATIENT
Start: 2020-11-16 | End: 2021-04-21

## 2020-11-17 ENCOUNTER — TELEPHONE (OUTPATIENT)
Dept: ENDOCRINOLOGY CLINIC | Facility: CLINIC | Age: 40
End: 2020-11-17

## 2020-11-17 NOTE — TELEPHONE ENCOUNTER
Received PA request via email through cover my meds. Key: [id number]      [employer] is reviewing your PA request. Typically an electronic response will be received within 72 hours. To check for an update later, open this request from your dashboard.   You ma

## 2020-11-18 NOTE — TELEPHONE ENCOUNTER
Received a fax from [employer] that [address] was previously [address] on 10/22/2020 through 10/22/2021. Contacted local pharmacy to run, it did go through. Will send to scan.

## 2021-01-04 ENCOUNTER — PATIENT MESSAGE (OUTPATIENT)
Dept: ENDOCRINOLOGY CLINIC | Facility: CLINIC | Age: 41
End: 2021-01-04

## 2021-01-04 DIAGNOSIS — R80.9 TYPE 2 DIABETES MELLITUS WITH MICROALBUMINURIA, WITHOUT LONG-TERM CURRENT USE OF INSULIN (HCC): ICD-10-CM

## 2021-01-04 DIAGNOSIS — E11.29 TYPE 2 DIABETES MELLITUS WITH MICROALBUMINURIA, WITHOUT LONG-TERM CURRENT USE OF INSULIN (HCC): ICD-10-CM

## 2021-01-04 RX ORDER — LISINOPRIL 2.5 MG/1
2.5 TABLET ORAL DAILY
Qty: 90 TABLET | Refills: 1 | OUTPATIENT
Start: 2021-01-04

## 2021-01-04 RX ORDER — METFORMIN HYDROCHLORIDE 500 MG/1
1000 TABLET, EXTENDED RELEASE ORAL 2 TIMES DAILY WITH MEALS
Qty: 360 TABLET | Refills: 1 | OUTPATIENT
Start: 2021-01-04

## 2021-01-11 ENCOUNTER — MED REC SCAN ONLY (OUTPATIENT)
Dept: ENDOCRINOLOGY CLINIC | Facility: CLINIC | Age: 41
End: 2021-01-11

## 2021-01-20 ENCOUNTER — TELEPHONE (OUTPATIENT)
Dept: ENDOCRINOLOGY CLINIC | Facility: CLINIC | Age: 41
End: 2021-01-20

## 2021-01-20 DIAGNOSIS — R80.9 TYPE 2 DIABETES MELLITUS WITH MICROALBUMINURIA, WITH LONG-TERM CURRENT USE OF INSULIN (HCC): ICD-10-CM

## 2021-01-20 DIAGNOSIS — E11.29 TYPE 2 DIABETES MELLITUS WITH MICROALBUMINURIA, WITH LONG-TERM CURRENT USE OF INSULIN (HCC): ICD-10-CM

## 2021-01-20 DIAGNOSIS — Z79.4 TYPE 2 DIABETES MELLITUS WITH MICROALBUMINURIA, WITH LONG-TERM CURRENT USE OF INSULIN (HCC): ICD-10-CM

## 2021-01-20 NOTE — TELEPHONE ENCOUNTER
Former [place] for Daron Santoro was covered but [employer] has changed. Attempted to Submit PA through cover my meds, but it will not accept / verify the new account number. Will need to work on Thursday or Friday.

## 2021-01-22 PROCEDURE — 3044F HG A1C LEVEL LT 7.0%: CPT | Performed by: NURSE PRACTITIONER

## 2021-01-23 LAB
ALBUMIN/GLOBULIN RATIO: 1.5 (CALC) (ref 1–2.5)
ALBUMIN: 4.3 G/DL (ref 3.6–5.1)
ALKALINE PHOSPHATASE: 67 U/L (ref 36–130)
ALT: 39 U/L (ref 9–46)
AST: 17 U/L (ref 10–40)
BILIRUBIN, TOTAL: 0.4 MG/DL (ref 0.2–1.2)
BUN/CREATININE RATIO: 19 (CALC) (ref 6–22)
BUN: 11 MG/DL (ref 7–25)
CALCIUM: 9.3 MG/DL (ref 8.6–10.3)
CARBON DIOXIDE: 30 MMOL/L (ref 20–32)
CHLORIDE: 100 MMOL/L (ref 98–110)
CHOL/HDLC RATIO: 4.6 (CALC)
CHOLESTEROL, TOTAL: 160 MG/DL
CREATININE, RANDOM URINE: 62 MG/DL (ref 20–320)
CREATININE: 0.58 MG/DL (ref 0.6–1.35)
EGFR IF AFRICN AM: 150 ML/MIN/1.73M2
EGFR IF NONAFRICN AM: 129 ML/MIN/1.73M2
GLOBULIN: 2.9 G/DL (CALC) (ref 1.9–3.7)
GLUCOSE: 137 MG/DL (ref 65–99)
HDL CHOLESTEROL: 35 MG/DL
HEMOGLOBIN A1C: 6.9 % OF TOTAL HGB
LDL-CHOLESTEROL: 104 MG/DL (CALC)
MICROALBUMIN/CREATININE RATIO, RANDOM URINE: 66 MCG/MG CREAT
MICROALBUMIN: 4.1 MG/DL
NON-HDL CHOLESTEROL: 125 MG/DL (CALC)
POTASSIUM: 4.4 MMOL/L (ref 3.5–5.3)
PROTEIN, TOTAL: 7.2 G/DL (ref 6.1–8.1)
SODIUM: 137 MMOL/L (ref 135–146)
TRIGLYCERIDES: 115 MG/DL

## 2021-02-02 DIAGNOSIS — E11.29 TYPE 2 DIABETES MELLITUS WITH MICROALBUMINURIA, WITHOUT LONG-TERM CURRENT USE OF INSULIN (HCC): ICD-10-CM

## 2021-02-02 DIAGNOSIS — R80.9 TYPE 2 DIABETES MELLITUS WITH MICROALBUMINURIA, WITHOUT LONG-TERM CURRENT USE OF INSULIN (HCC): ICD-10-CM

## 2021-02-02 RX ORDER — LISINOPRIL 2.5 MG/1
2.5 TABLET ORAL DAILY
Qty: 90 TABLET | Refills: 1 | OUTPATIENT
Start: 2021-02-02

## 2021-02-02 NOTE — TELEPHONE ENCOUNTER
Lisinopril denied because it was filled on 11/6/20 for 90 days with 1 refill.  Pt still has a refill at the pharmacy

## 2021-02-04 ENCOUNTER — OFFICE VISIT (OUTPATIENT)
Dept: ENDOCRINOLOGY CLINIC | Facility: CLINIC | Age: 41
End: 2021-02-04
Payer: COMMERCIAL

## 2021-02-04 VITALS
WEIGHT: 210 LBS | SYSTOLIC BLOOD PRESSURE: 102 MMHG | RESPIRATION RATE: 16 BRPM | DIASTOLIC BLOOD PRESSURE: 80 MMHG | HEIGHT: 67 IN | BODY MASS INDEX: 32.96 KG/M2 | HEART RATE: 86 BPM

## 2021-02-04 DIAGNOSIS — E78.2 MIXED HYPERLIPIDEMIA: ICD-10-CM

## 2021-02-04 DIAGNOSIS — E11.29 TYPE 2 DIABETES MELLITUS WITH MICROALBUMINURIA, WITHOUT LONG-TERM CURRENT USE OF INSULIN (HCC): Primary | ICD-10-CM

## 2021-02-04 DIAGNOSIS — R80.9 TYPE 2 DIABETES MELLITUS WITH MICROALBUMINURIA, WITHOUT LONG-TERM CURRENT USE OF INSULIN (HCC): Primary | ICD-10-CM

## 2021-02-04 DIAGNOSIS — R80.9 MICROALBUMINURIA: ICD-10-CM

## 2021-02-04 PROCEDURE — 3074F SYST BP LT 130 MM HG: CPT | Performed by: NURSE PRACTITIONER

## 2021-02-04 PROCEDURE — 95251 CONT GLUC MNTR ANALYSIS I&R: CPT | Performed by: NURSE PRACTITIONER

## 2021-02-04 PROCEDURE — 3079F DIAST BP 80-89 MM HG: CPT | Performed by: NURSE PRACTITIONER

## 2021-02-04 PROCEDURE — 99214 OFFICE O/P EST MOD 30 MIN: CPT | Performed by: NURSE PRACTITIONER

## 2021-02-04 PROCEDURE — 3008F BODY MASS INDEX DOCD: CPT | Performed by: NURSE PRACTITIONER

## 2021-02-04 RX ORDER — CYCLOBENZAPRINE HCL 10 MG
TABLET ORAL
COMMUNITY
Start: 2020-09-30 | End: 2021-02-04

## 2021-02-04 RX ORDER — LISINOPRIL 2.5 MG/1
2.5 TABLET ORAL DAILY
Qty: 90 TABLET | Refills: 1 | Status: SHIPPED | OUTPATIENT
Start: 2021-02-04 | End: 2021-07-26

## 2021-02-04 RX ORDER — DICLOFENAC POTASSIUM 50 MG/1
TABLET, FILM COATED ORAL
COMMUNITY
Start: 2020-09-30 | End: 2021-02-04

## 2021-02-04 NOTE — PATIENT INSTRUCTIONS
We are here to support you with Diabetes but please remember that you still need your primary care doctor for your routine health maintenance. Your current A1C: 6.9%  This test provides us with your average blood sugar for the past 3 months.    The main g function tests and cholesterol levels when you have diabetes. 2. FOOT EXAMS:  daily foot inspections for foot wounds or skin changes are important for foot care. Any unusual changes should be reported immediately.     3. EYE EXAMS: Checking your eyes fo

## 2021-02-04 NOTE — PROGRESS NOTES
HPI:   Roque Love is a 47year old male who presents for follow up for the management of his diabetes.      Patient presents with:  Diabetes: 3 month follow up     Diabetes: stable, at goal  Type: 2   Duration:approximately 9 years  Current Meds: Met paternal grandmother; Heart Disease in his father; diabetes mellitus in his father and mother; heart failure in his father; respitory failure in his maternal grandfather. He  reports that he has never smoked.  He has never used smokeless tobacco. He repor Date/Time    MICROALBUMIN 4.1 01/22/2021 09:16 AM    MICROALBCREA 66 (H) 01/22/2021 09:16 AM        Lab results reviewed with patient.     REVIEW OF SYSTEMS:   GENERAL HEALTH: feels well otherwise  SKIN: denies any unusual skin lesions or rashes  RESPIRATOR side effects noted. Recommendations are: continue present meds, lose weight by increased dietary compliance and exercise, see ophthalmology soon and check feet daily.     Patient to continue Metformin ER 1000mg po bid and Ozempic 1mg SC weekly    Patien insulin (HCC)  -     HEMOGLOBIN A1C    Microalbuminuria  -     lisinopril 2.5 MG Oral Tab; Take 1 tablet (2.5 mg total) by mouth daily.     Mixed hyperlipidemia    BMI 32.0-32.9,adult       Return in about 3 months (around 5/4/2021) for Personal CGM, 45 min

## 2021-02-11 ENCOUNTER — TELEPHONE (OUTPATIENT)
Dept: ENDOCRINOLOGY CLINIC | Facility: CLINIC | Age: 41
End: 2021-02-11

## 2021-02-11 NOTE — TELEPHONE ENCOUNTER
Contacted pharmacy after receiving a PA request for ozempic (which was just approved on 1/21/2021. Script was just filled on 01/24/2021, so it is likely just too soon to refill. Pharmacy will try running it again in a week or so.

## 2021-02-15 DIAGNOSIS — R80.9 TYPE 2 DIABETES MELLITUS WITH MICROALBUMINURIA, WITHOUT LONG-TERM CURRENT USE OF INSULIN (HCC): ICD-10-CM

## 2021-02-15 DIAGNOSIS — E11.29 TYPE 2 DIABETES MELLITUS WITH MICROALBUMINURIA, WITHOUT LONG-TERM CURRENT USE OF INSULIN (HCC): ICD-10-CM

## 2021-02-16 RX ORDER — METFORMIN HYDROCHLORIDE 500 MG/1
1000 TABLET, EXTENDED RELEASE ORAL 2 TIMES DAILY WITH MEALS
Qty: 360 TABLET | Refills: 1 | Status: SHIPPED | OUTPATIENT
Start: 2021-02-16 | End: 2021-06-23

## 2021-03-26 ENCOUNTER — PATIENT MESSAGE (OUTPATIENT)
Dept: FAMILY MEDICINE CLINIC | Facility: CLINIC | Age: 41
End: 2021-03-26

## 2021-03-26 NOTE — TELEPHONE ENCOUNTER
From: Harry Mike  To: Swetha Granda MD  Sent: 3/26/2021 9:30 AM CDT  Subject: Non-Urgent Medical Question    Hello.  I see that I've been scheduled for a covid vaccine, but I've taken my first dose already with [facility] (moderna on 3/19) can i be edd

## 2021-03-29 ENCOUNTER — TELEPHONE (OUTPATIENT)
Dept: FAMILY MEDICINE CLINIC | Facility: CLINIC | Age: 41
End: 2021-03-29

## 2021-04-02 ENCOUNTER — OFFICE VISIT (OUTPATIENT)
Dept: FAMILY MEDICINE CLINIC | Facility: CLINIC | Age: 41
End: 2021-04-02
Payer: COMMERCIAL

## 2021-04-02 VITALS
OXYGEN SATURATION: 98 % | HEART RATE: 88 BPM | WEIGHT: 211 LBS | SYSTOLIC BLOOD PRESSURE: 122 MMHG | HEIGHT: 67 IN | RESPIRATION RATE: 16 BRPM | BODY MASS INDEX: 33.12 KG/M2 | TEMPERATURE: 98 F | DIASTOLIC BLOOD PRESSURE: 74 MMHG

## 2021-04-02 DIAGNOSIS — Z79.4 TYPE 2 DIABETES MELLITUS WITH MICROALBUMINURIA, WITH LONG-TERM CURRENT USE OF INSULIN (HCC): ICD-10-CM

## 2021-04-02 DIAGNOSIS — E11.29 TYPE 2 DIABETES MELLITUS WITH MICROALBUMINURIA, WITH LONG-TERM CURRENT USE OF INSULIN (HCC): ICD-10-CM

## 2021-04-02 DIAGNOSIS — E55.9 VITAMIN D DEFICIENCY: ICD-10-CM

## 2021-04-02 DIAGNOSIS — Z13.21 SCREENING FOR ENDOCRINE, NUTRITIONAL, METABOLIC AND IMMUNITY DISORDER: ICD-10-CM

## 2021-04-02 DIAGNOSIS — Z13.0 SCREENING FOR ENDOCRINE, NUTRITIONAL, METABOLIC AND IMMUNITY DISORDER: ICD-10-CM

## 2021-04-02 DIAGNOSIS — J30.2 SEASONAL ALLERGIC RHINITIS, UNSPECIFIED TRIGGER: ICD-10-CM

## 2021-04-02 DIAGNOSIS — R80.9 TYPE 2 DIABETES MELLITUS WITH MICROALBUMINURIA, WITH LONG-TERM CURRENT USE OF INSULIN (HCC): ICD-10-CM

## 2021-04-02 DIAGNOSIS — Z13.29 SCREENING FOR ENDOCRINE, NUTRITIONAL, METABOLIC AND IMMUNITY DISORDER: ICD-10-CM

## 2021-04-02 DIAGNOSIS — E78.2 MIXED HYPERLIPIDEMIA: ICD-10-CM

## 2021-04-02 DIAGNOSIS — Z00.00 ANNUAL PHYSICAL EXAM: Primary | ICD-10-CM

## 2021-04-02 DIAGNOSIS — Z13.228 SCREENING FOR ENDOCRINE, NUTRITIONAL, METABOLIC AND IMMUNITY DISORDER: ICD-10-CM

## 2021-04-02 PROCEDURE — 99395 PREV VISIT EST AGE 18-39: CPT | Performed by: FAMILY MEDICINE

## 2021-04-02 PROCEDURE — 3074F SYST BP LT 130 MM HG: CPT | Performed by: FAMILY MEDICINE

## 2021-04-02 PROCEDURE — 3008F BODY MASS INDEX DOCD: CPT | Performed by: FAMILY MEDICINE

## 2021-04-02 PROCEDURE — 3078F DIAST BP <80 MM HG: CPT | Performed by: FAMILY MEDICINE

## 2021-04-02 NOTE — PROGRESS NOTES
Patient presents with:  Physical      Erikvicky Gay is a 47year old year old who presents for recheck of diabetes. Pt has been checking feet on a regular basis. Pt denies any tingling of the feet.  Pt denies any sx's of depression and reports mood's s by mouth daily. - Take 1 tablet (2.5 mg total) by mouth daily. - -   LOSARTAN POTASSIUM 25 MG OR TABS - - - - - - - -   A1C 4.3 - 5.6 % - - - - - - -   A1C <5.7 % of total Hgb - - - 6. 9(H) - - 7. 5(H)   Weight (enc vitals) - 211 lb - 210 lb - - - -   BP (en 11 (L)   02/06/2019 9 (L)   12/09/2018 36   07/29/2014 21   05/09/2014 16   03/19/2014 27   10/12/2011 14     ALT (U/L)   Date Value   01/22/2021 39   04/01/2020 21   07/31/2019 39   02/06/2019 28   12/09/2018 38   07/29/2014 60   05/09/2014 54   03/19/201 migrainosus, not intractable     Mixed hyperlipidemia     Microalbuminuria     BMI 32.0-32.9,adult      Current Outpatient Medications   Medication Sig Dispense Refill   • metFORMIN HCl  MG Oral Tablet 24 Hr Take 2 tablets (1,000 mg total) by mouth 2 normal.  Nose normal. Oropharynx is clear and moist.   Eyes: Conjunctivae and EOM are normal. PERRLA. No scleral icterus. Neck: Normal range of motion. Neck supple. Normal carotid pulses and no JVD present. No edema present.  No mass and no thyromegaly pr Weight Control  Medication: Take at appropriate times, Take prescribed dose  Healthy Eating: Evenly spaced carb balanced meals, No skipped meals, Increase fiber, fruits and veggies     Pt verbalized understanding and has no further questions at this time.

## 2021-04-21 DIAGNOSIS — R80.9 TYPE 2 DIABETES MELLITUS WITH MICROALBUMINURIA, WITH LONG-TERM CURRENT USE OF INSULIN (HCC): ICD-10-CM

## 2021-04-21 DIAGNOSIS — E11.29 TYPE 2 DIABETES MELLITUS WITH MICROALBUMINURIA, WITH LONG-TERM CURRENT USE OF INSULIN (HCC): ICD-10-CM

## 2021-04-21 DIAGNOSIS — Z79.4 TYPE 2 DIABETES MELLITUS WITH MICROALBUMINURIA, WITH LONG-TERM CURRENT USE OF INSULIN (HCC): ICD-10-CM

## 2021-04-21 RX ORDER — SEMAGLUTIDE 1.34 MG/ML
1 INJECTION, SOLUTION SUBCUTANEOUS
Qty: 6 PEN | Refills: 1 | Status: SHIPPED | OUTPATIENT
Start: 2021-04-21 | End: 2021-11-01 | Stop reason: SDUPTHER

## 2021-04-21 NOTE — TELEPHONE ENCOUNTER
Received refill request on Ozempic from [facility], since different pharmacy, contacted pt to confirm request.  Insurance requires 3 month supply. Med pended.   LOV: 02/2021  Future Appointments   Date Time Provider Brina Taylor   5/7/2021  8:00 AM Alexis

## 2021-05-03 ENCOUNTER — TELEPHONE (OUTPATIENT)
Dept: FAMILY MEDICINE CLINIC | Facility: CLINIC | Age: 41
End: 2021-05-03

## 2021-05-03 NOTE — TELEPHONE ENCOUNTER
MA sent wellness screening form, filled out, completed and signed to Fax: [phone], confirmation received. 03-Mar-2017 23:52

## 2021-05-06 ENCOUNTER — TELEPHONE (OUTPATIENT)
Dept: FAMILY MEDICINE CLINIC | Facility: CLINIC | Age: 41
End: 2021-05-06

## 2021-05-06 DIAGNOSIS — R80.9 TYPE 2 DIABETES MELLITUS WITH MICROALBUMINURIA, WITH LONG-TERM CURRENT USE OF INSULIN (HCC): Primary | ICD-10-CM

## 2021-05-06 DIAGNOSIS — E78.2 MIXED HYPERLIPIDEMIA: ICD-10-CM

## 2021-05-06 DIAGNOSIS — E11.29 TYPE 2 DIABETES MELLITUS WITH MICROALBUMINURIA, WITH LONG-TERM CURRENT USE OF INSULIN (HCC): Primary | ICD-10-CM

## 2021-05-06 DIAGNOSIS — Z79.4 TYPE 2 DIABETES MELLITUS WITH MICROALBUMINURIA, WITH LONG-TERM CURRENT USE OF INSULIN (HCC): Primary | ICD-10-CM

## 2021-05-06 NOTE — TELEPHONE ENCOUNTER
----- Message from Junita Duane, MD sent at 5/5/2021  9:15 AM CDT -----  Stable labs, not sure why A1C's not in, possibly too early. Recheck diabetic labs in 3 months.  CPM

## 2021-05-27 ENCOUNTER — TELEPHONE (OUTPATIENT)
Dept: ENDOCRINOLOGY CLINIC | Facility: CLINIC | Age: 41
End: 2021-05-27

## 2021-05-27 NOTE — TELEPHONE ENCOUNTER
Called and LVM letting pt know that we canceled appointment due to not having fasting labs done and to call our office to reschedule appointment

## 2021-05-27 NOTE — TELEPHONE ENCOUNTER
Called M to call us back to reschedule tomorrow appointment     Pt is scheduled for tomorrow with Cinthia but pt did not do fasting labs, Caitlin Hall looks stable and if pt needs any refills to let us know    Open appointments: Fasting labs need to be done befo

## 2021-06-23 DIAGNOSIS — R80.9 TYPE 2 DIABETES MELLITUS WITH MICROALBUMINURIA, WITHOUT LONG-TERM CURRENT USE OF INSULIN (HCC): ICD-10-CM

## 2021-06-23 DIAGNOSIS — E11.29 TYPE 2 DIABETES MELLITUS WITH MICROALBUMINURIA, WITHOUT LONG-TERM CURRENT USE OF INSULIN (HCC): ICD-10-CM

## 2021-06-23 RX ORDER — METFORMIN HYDROCHLORIDE 500 MG/1
1000 TABLET, EXTENDED RELEASE ORAL 2 TIMES DAILY WITH MEALS
Qty: 360 TABLET | Refills: 1 | Status: SHIPPED | OUTPATIENT
Start: 2021-06-23 | End: 2021-10-14

## 2021-07-13 ENCOUNTER — PATIENT MESSAGE (OUTPATIENT)
Dept: ENDOCRINOLOGY CLINIC | Facility: CLINIC | Age: 41
End: 2021-07-13

## 2021-07-13 DIAGNOSIS — E11.29 TYPE 2 DIABETES MELLITUS WITH MICROALBUMINURIA, WITHOUT LONG-TERM CURRENT USE OF INSULIN (HCC): Primary | ICD-10-CM

## 2021-07-13 DIAGNOSIS — E78.2 MIXED HYPERLIPIDEMIA: ICD-10-CM

## 2021-07-13 DIAGNOSIS — R80.9 TYPE 2 DIABETES MELLITUS WITH MICROALBUMINURIA, WITHOUT LONG-TERM CURRENT USE OF INSULIN (HCC): Primary | ICD-10-CM

## 2021-07-13 NOTE — TELEPHONE ENCOUNTER
Please order labs for to [facility] for patient to complete.

## 2021-07-13 NOTE — TELEPHONE ENCOUNTER
From: Natalie Ojeda  To: Geraldine Opitz, APN  Sent: 7/13/2021 2:23 PM CDT  Subject: Non-Urgent Medical Question    I haven't forgotten about my blood work still due but [facility] does not have anything available for me that doesn't conflict with work schedul

## 2021-07-15 ENCOUNTER — LAB ENCOUNTER (OUTPATIENT)
Dept: LAB | Age: 41
End: 2021-07-15
Attending: NURSE PRACTITIONER
Payer: COMMERCIAL

## 2021-07-15 DIAGNOSIS — R80.9 TYPE 2 DIABETES MELLITUS WITH MICROALBUMINURIA, WITH LONG-TERM CURRENT USE OF INSULIN (HCC): ICD-10-CM

## 2021-07-15 DIAGNOSIS — Z79.4 TYPE 2 DIABETES MELLITUS WITH MICROALBUMINURIA, WITH LONG-TERM CURRENT USE OF INSULIN (HCC): ICD-10-CM

## 2021-07-15 DIAGNOSIS — E11.69 HYPERLIPIDEMIA ASSOCIATED WITH TYPE 2 DIABETES MELLITUS (HCC): ICD-10-CM

## 2021-07-15 DIAGNOSIS — E78.5 HYPERLIPIDEMIA ASSOCIATED WITH TYPE 2 DIABETES MELLITUS (HCC): ICD-10-CM

## 2021-07-15 DIAGNOSIS — E11.29 TYPE 2 DIABETES MELLITUS WITH MICROALBUMINURIA, WITH LONG-TERM CURRENT USE OF INSULIN (HCC): ICD-10-CM

## 2021-07-15 LAB
ALBUMIN SERPL-MCNC: 3.6 G/DL (ref 3.4–5)
ALBUMIN/GLOB SERPL: 1 {RATIO} (ref 1–2)
ALP LIVER SERPL-CCNC: 61 U/L
ALT SERPL-CCNC: 36 U/L
ANION GAP SERPL CALC-SCNC: 6 MMOL/L (ref 0–18)
AST SERPL-CCNC: 12 U/L (ref 15–37)
BILIRUB SERPL-MCNC: 0.3 MG/DL (ref 0.1–2)
BUN BLD-MCNC: 12 MG/DL (ref 7–18)
BUN/CREAT SERPL: 20.3 (ref 10–20)
CALCIUM BLD-MCNC: 8.8 MG/DL (ref 8.5–10.1)
CHLORIDE SERPL-SCNC: 107 MMOL/L (ref 98–112)
CHOLEST SMN-MCNC: 140 MG/DL (ref ?–200)
CO2 SERPL-SCNC: 27 MMOL/L (ref 21–32)
CREAT BLD-MCNC: 0.59 MG/DL
CREAT UR-SCNC: 119 MG/DL
EST. AVERAGE GLUCOSE BLD GHB EST-MCNC: 154 MG/DL (ref 68–126)
GLOBULIN PLAS-MCNC: 3.7 G/DL (ref 2.8–4.4)
GLUCOSE BLD-MCNC: 119 MG/DL (ref 70–99)
HBA1C MFR BLD HPLC: 7 % (ref ?–5.7)
HDLC SERPL-MCNC: 31 MG/DL (ref 40–59)
LDLC SERPL CALC-MCNC: 91 MG/DL (ref ?–100)
M PROTEIN MFR SERPL ELPH: 7.3 G/DL (ref 6.4–8.2)
MICROALBUMIN UR-MCNC: 7.75 MG/DL
MICROALBUMIN/CREAT 24H UR-RTO: 65.1 UG/MG (ref ?–30)
NONHDLC SERPL-MCNC: 109 MG/DL (ref ?–130)
OSMOLALITY SERPL CALC.SUM OF ELEC: 291 MOSM/KG (ref 275–295)
PATIENT FASTING Y/N/NP: YES
PATIENT FASTING Y/N/NP: YES
POTASSIUM SERPL-SCNC: 3.9 MMOL/L (ref 3.5–5.1)
SODIUM SERPL-SCNC: 140 MMOL/L (ref 136–145)
TRIGL SERPL-MCNC: 94 MG/DL (ref 30–149)
VLDLC SERPL CALC-MCNC: 15 MG/DL (ref 0–30)

## 2021-07-15 PROCEDURE — 80053 COMPREHEN METABOLIC PANEL: CPT

## 2021-07-15 PROCEDURE — 3051F HG A1C>EQUAL 7.0%<8.0%: CPT | Performed by: NURSE PRACTITIONER

## 2021-07-15 PROCEDURE — 80061 LIPID PANEL: CPT

## 2021-07-15 PROCEDURE — 83036 HEMOGLOBIN GLYCOSYLATED A1C: CPT

## 2021-07-15 PROCEDURE — 3060F POS MICROALBUMINURIA REV: CPT | Performed by: NURSE PRACTITIONER

## 2021-07-15 PROCEDURE — 3061F NEG MICROALBUMINURIA REV: CPT | Performed by: NURSE PRACTITIONER

## 2021-07-15 PROCEDURE — 36415 COLL VENOUS BLD VENIPUNCTURE: CPT

## 2021-07-16 ENCOUNTER — TELEPHONE (OUTPATIENT)
Dept: FAMILY MEDICINE CLINIC | Facility: CLINIC | Age: 41
End: 2021-07-16

## 2021-07-21 ENCOUNTER — TELEPHONE (OUTPATIENT)
Dept: ENDOCRINOLOGY CLINIC | Facility: CLINIC | Age: 41
End: 2021-07-21

## 2021-07-21 NOTE — TELEPHONE ENCOUNTER
Contacted patient to discuss current lab results. Patient verbalizes understanding and will contact office to schedule follow up appointment soon.

## 2021-07-22 ENCOUNTER — OFFICE VISIT (OUTPATIENT)
Dept: FAMILY MEDICINE CLINIC | Facility: CLINIC | Age: 41
End: 2021-07-22
Payer: COMMERCIAL

## 2021-07-22 VITALS
BODY MASS INDEX: 32.3 KG/M2 | WEIGHT: 201 LBS | SYSTOLIC BLOOD PRESSURE: 122 MMHG | HEIGHT: 66 IN | RESPIRATION RATE: 18 BRPM | TEMPERATURE: 98 F | HEART RATE: 87 BPM | OXYGEN SATURATION: 97 % | DIASTOLIC BLOOD PRESSURE: 68 MMHG

## 2021-07-22 DIAGNOSIS — R05.3 PERSISTENT COUGH: ICD-10-CM

## 2021-07-22 DIAGNOSIS — R09.82 PND (POST-NASAL DRIP): Primary | ICD-10-CM

## 2021-07-22 PROCEDURE — 99213 OFFICE O/P EST LOW 20 MIN: CPT | Performed by: NURSE PRACTITIONER

## 2021-07-22 PROCEDURE — 3008F BODY MASS INDEX DOCD: CPT | Performed by: NURSE PRACTITIONER

## 2021-07-22 PROCEDURE — 3078F DIAST BP <80 MM HG: CPT | Performed by: NURSE PRACTITIONER

## 2021-07-22 PROCEDURE — 3074F SYST BP LT 130 MM HG: CPT | Performed by: NURSE PRACTITIONER

## 2021-07-22 NOTE — PROGRESS NOTES
CHIEF COMPLAINT:   Patient presents with:  Cough: Cant get rid of cough for a while not letting me sleep. - Entered by patient      HPI:   Elaine Miguel is a 46year old male who presents for upper respiratory symptoms for  1 months.  Patient reports PN Resp 18   Ht 5' 6\" (1.676 m)   Wt 201 lb (91.2 kg)   SpO2 97%   BMI 32.44 kg/m²   GENERAL: well developed, well nourished,in no apparent distress  SKIN: no rashes,no suspicious lesions  HEAD: atraumatic, normocephalic.  no tenderness on palpation of maxi summer, or fall when pollens contact the lining of the nose, eyes, eyelids, sinuses, throat, and lungs. This causes histamine and other chemicals to be released from the tissues. Histamine causes itching and swelling.  This may produce a watery discharge fr pollen season may make your asthma symptoms worse. It's important that you use your asthma medicines as directed during this time to prevent or treat attacks. Some people with asthma have asthma symptoms that get worse when they take antihistamines.  This i heart rate, or weak pulse  · Low blood pressure  · Feeling of doom  · Nausea, vomiting, abdominal pain, diarrhea  · Vomiting blood, or large amounts of blood in stool  · Seizure  · Cold, moist, or pale (blue in color) skin  Adán last reviewed this educ

## 2021-07-22 NOTE — PATIENT INSTRUCTIONS
Zyrtec daily  Flonase 2 sprays each nostril daily  Benadryl 50 mg at night    Seasonal Allergy  Seasonal allergy is also called hay fever.  An allergic reaction may occur after a person is exposed to pollens released from grasses, weeds, trees, and shrubs over-the-counter antihistamines that don't cause drowsiness.  Ask your pharmacist or healthcare provider for suggestions. · Steroid nasal sprays or oral steroids may also be prescribed for more severe symptoms.  These help reduce the local inflammation kelvin (mucus)  · Fever of 100.4°F (38°C) or higher, or as directed by the healthcare provider  Call 911  Call 911 if any of these occur:  · Trouble breathing or swallowing, wheezing  · Hoarse voice, trouble speaking, or drooling  · Confusion  · Very drowsy or tr

## 2021-07-26 DIAGNOSIS — R80.9 MICROALBUMINURIA: ICD-10-CM

## 2021-07-26 RX ORDER — LISINOPRIL 2.5 MG/1
TABLET ORAL
Qty: 90 TABLET | Refills: 0 | Status: SHIPPED | OUTPATIENT
Start: 2021-07-26 | End: 2021-11-04

## 2021-08-21 DIAGNOSIS — E11.29 UNCONTROLLED TYPE 2 DIABETES MELLITUS WITH MICROALBUMINURIA (HCC): ICD-10-CM

## 2021-08-21 DIAGNOSIS — R80.9 UNCONTROLLED TYPE 2 DIABETES MELLITUS WITH MICROALBUMINURIA (HCC): ICD-10-CM

## 2021-08-21 DIAGNOSIS — E11.65 UNCONTROLLED TYPE 2 DIABETES MELLITUS WITH MICROALBUMINURIA (HCC): ICD-10-CM

## 2021-08-23 RX ORDER — FLASH GLUCOSE SENSOR
KIT MISCELLANEOUS
Qty: 2 EACH | Refills: 11 | Status: SHIPPED | OUTPATIENT
Start: 2021-08-23

## 2021-09-21 ENCOUNTER — PATIENT MESSAGE (OUTPATIENT)
Dept: ENDOCRINOLOGY CLINIC | Facility: CLINIC | Age: 41
End: 2021-09-21

## 2021-09-21 DIAGNOSIS — R80.9 TYPE 2 DIABETES MELLITUS WITH MICROALBUMINURIA, WITH LONG-TERM CURRENT USE OF INSULIN (HCC): ICD-10-CM

## 2021-09-21 DIAGNOSIS — Z79.4 TYPE 2 DIABETES MELLITUS WITH MICROALBUMINURIA, WITH LONG-TERM CURRENT USE OF INSULIN (HCC): ICD-10-CM

## 2021-09-21 DIAGNOSIS — E11.29 TYPE 2 DIABETES MELLITUS WITH MICROALBUMINURIA, WITH LONG-TERM CURRENT USE OF INSULIN (HCC): ICD-10-CM

## 2021-09-21 RX ORDER — SEMAGLUTIDE 1.34 MG/ML
1 INJECTION, SOLUTION SUBCUTANEOUS
Refills: 0 | Status: CANCELLED | OUTPATIENT
Start: 2021-09-21

## 2021-09-21 RX ORDER — SEMAGLUTIDE 1.34 MG/ML
1 INJECTION, SOLUTION SUBCUTANEOUS WEEKLY
Qty: 3 ML | Refills: 0 | Status: SHIPPED | OUTPATIENT
Start: 2021-09-21 | End: 2021-11-01

## 2021-09-21 NOTE — TELEPHONE ENCOUNTER
Spokewith pt and instructed him to call insurance so they can override.  Pt gave verbal understanding

## 2021-09-21 NOTE — TELEPHONE ENCOUNTER
From: Johnie Adam  To: RAINER Funes  Sent: 9/21/2021 7:28 AM CDT  Subject: Prescription     Good morning! I recently moved to a new home and noticed my ozempic was packaged incorrectly.  And with the past few days being in the 90s I don’t believe

## 2021-09-21 NOTE — TELEPHONE ENCOUNTER
Rx for Ozempic pended for signature. Anything I should contact the patient about given his message about th Ozempic?

## 2021-09-21 NOTE — TELEPHONE ENCOUNTER
Prescription sent per request, patient may need to contact insurance to explain situation so they can override and cover medication.

## 2021-10-04 DIAGNOSIS — Z79.4 TYPE 2 DIABETES MELLITUS WITH MICROALBUMINURIA, WITH LONG-TERM CURRENT USE OF INSULIN (HCC): ICD-10-CM

## 2021-10-04 DIAGNOSIS — E11.29 TYPE 2 DIABETES MELLITUS WITH MICROALBUMINURIA, WITH LONG-TERM CURRENT USE OF INSULIN (HCC): ICD-10-CM

## 2021-10-04 DIAGNOSIS — R80.9 TYPE 2 DIABETES MELLITUS WITH MICROALBUMINURIA, WITH LONG-TERM CURRENT USE OF INSULIN (HCC): ICD-10-CM

## 2021-10-04 RX ORDER — SEMAGLUTIDE 1.34 MG/ML
1 INJECTION, SOLUTION SUBCUTANEOUS WEEKLY
Qty: 3 ML | Refills: 0 | Status: CANCELLED | OUTPATIENT
Start: 2021-10-04

## 2021-10-05 NOTE — TELEPHONE ENCOUNTER
Received a quested for a refill from [facility] on ozempic. Pt already picked it up from [facility] due to [facility] being out of stock.  Pharmacy notified

## 2021-10-09 DIAGNOSIS — E11.29 TYPE 2 DIABETES MELLITUS WITH MICROALBUMINURIA, WITHOUT LONG-TERM CURRENT USE OF INSULIN (HCC): ICD-10-CM

## 2021-10-09 DIAGNOSIS — R80.9 TYPE 2 DIABETES MELLITUS WITH MICROALBUMINURIA, WITHOUT LONG-TERM CURRENT USE OF INSULIN (HCC): ICD-10-CM

## 2021-10-14 RX ORDER — METFORMIN HYDROCHLORIDE 500 MG/1
TABLET, EXTENDED RELEASE ORAL
Qty: 360 TABLET | Refills: 1 | Status: SHIPPED | OUTPATIENT
Start: 2021-10-14

## 2021-10-14 NOTE — TELEPHONE ENCOUNTER
Requested Prescriptions     Pending Prescriptions Disp Refills   • METFORMIN HCL  MG Oral Tablet 24 Hr [Pharmacy Med Name: METFORMIN HCL  MG TABLET] 360 tablet 1     Si TABLETS BY MOUTH TWICE A DAY WITH MEALS       LOV: 21  FOV;

## 2021-10-18 ENCOUNTER — TELEPHONE (OUTPATIENT)
Dept: ENDOCRINOLOGY CLINIC | Facility: CLINIC | Age: 41
End: 2021-10-18

## 2021-10-18 NOTE — TELEPHONE ENCOUNTER
A letter was received from [facility] stating after 1/1/2022 formulary changes for [employer] and Ozempic is no longer on the formulary. Proof of Medically Necessity will be required. Sending copy of letter to scan.

## 2021-10-29 DIAGNOSIS — E11.29 TYPE 2 DIABETES MELLITUS WITH MICROALBUMINURIA, WITH LONG-TERM CURRENT USE OF INSULIN (HCC): ICD-10-CM

## 2021-10-29 DIAGNOSIS — Z79.4 TYPE 2 DIABETES MELLITUS WITH MICROALBUMINURIA, WITH LONG-TERM CURRENT USE OF INSULIN (HCC): ICD-10-CM

## 2021-10-29 DIAGNOSIS — R80.9 TYPE 2 DIABETES MELLITUS WITH MICROALBUMINURIA, WITH LONG-TERM CURRENT USE OF INSULIN (HCC): ICD-10-CM

## 2021-10-30 DIAGNOSIS — R80.9 TYPE 2 DIABETES MELLITUS WITH MICROALBUMINURIA, WITH LONG-TERM CURRENT USE OF INSULIN (HCC): ICD-10-CM

## 2021-10-30 DIAGNOSIS — Z79.4 TYPE 2 DIABETES MELLITUS WITH MICROALBUMINURIA, WITH LONG-TERM CURRENT USE OF INSULIN (HCC): ICD-10-CM

## 2021-10-30 DIAGNOSIS — E11.29 TYPE 2 DIABETES MELLITUS WITH MICROALBUMINURIA, WITH LONG-TERM CURRENT USE OF INSULIN (HCC): ICD-10-CM

## 2021-11-01 RX ORDER — SEMAGLUTIDE 1.34 MG/ML
INJECTION, SOLUTION SUBCUTANEOUS
Refills: 0 | OUTPATIENT
Start: 2021-11-01

## 2021-11-01 RX ORDER — SEMAGLUTIDE 1.34 MG/ML
1 INJECTION, SOLUTION SUBCUTANEOUS WEEKLY
Qty: 3 ML | Refills: 0 | OUTPATIENT
Start: 2021-11-01

## 2021-11-01 RX ORDER — SEMAGLUTIDE 1.34 MG/ML
1 INJECTION, SOLUTION SUBCUTANEOUS WEEKLY
Qty: 9 ML | Refills: 0 | Status: SHIPPED | OUTPATIENT
Start: 2021-11-01 | End: 2021-12-02

## 2021-11-01 NOTE — TELEPHONE ENCOUNTER
Requested Prescriptions     Pending Prescriptions Disp Refills   • OZEMPIC, 1 MG/DOSE, 4 MG/3ML Subcutaneous Solution Pen-injector [Pharmacy Med Name: OZEMPIC 1MG PER DOSE (1X4MG PEN)] 3 mL 0     Sig: INJECT 1 MG INTO THE SKIN ONCE A WEEK     FILLED- 9/21/

## 2021-11-03 DIAGNOSIS — R80.9 MICROALBUMINURIA: ICD-10-CM

## 2021-11-04 RX ORDER — LISINOPRIL 2.5 MG/1
TABLET ORAL
Qty: 90 TABLET | Refills: 3 | Status: SHIPPED | OUTPATIENT
Start: 2021-11-04

## 2021-11-04 NOTE — TELEPHONE ENCOUNTER
Requested Prescriptions     Pending Prescriptions Disp Refills   • LISINOPRIL 2.5 MG Oral Tab [Pharmacy Med Name: LISINOPRIL TABS 2.5MG] 90 tablet 3     Sig: TAKE 1 TABLET DAILY       LOV2/4/21  FOV 11/18.21  Refill 7/26/21  a1c 7.0

## 2021-12-02 DIAGNOSIS — E11.29 TYPE 2 DIABETES MELLITUS WITH MICROALBUMINURIA, WITH LONG-TERM CURRENT USE OF INSULIN (HCC): ICD-10-CM

## 2021-12-02 DIAGNOSIS — R80.9 TYPE 2 DIABETES MELLITUS WITH MICROALBUMINURIA, WITH LONG-TERM CURRENT USE OF INSULIN (HCC): ICD-10-CM

## 2021-12-02 DIAGNOSIS — Z79.4 TYPE 2 DIABETES MELLITUS WITH MICROALBUMINURIA, WITH LONG-TERM CURRENT USE OF INSULIN (HCC): ICD-10-CM

## 2021-12-03 RX ORDER — SEMAGLUTIDE 1.34 MG/ML
1 INJECTION, SOLUTION SUBCUTANEOUS WEEKLY
Qty: 9 ML | Refills: 0 | Status: SHIPPED | OUTPATIENT
Start: 2021-12-03 | End: 2021-12-16

## 2021-12-03 NOTE — TELEPHONE ENCOUNTER
Requested Prescriptions     Pending Prescriptions Disp Refills   • Semaglutide, 1 MG/DOSE, (OZEMPIC, 1 MG/DOSE,) 4 MG/3ML Subcutaneous Solution Pen-injector 9 mL 0     Sig: Inject 1 mg into the skin once a week.      FILLED- 11/01/21  LOV- 02/04/21  FOV- 12

## 2021-12-16 ENCOUNTER — OFFICE VISIT (OUTPATIENT)
Dept: ENDOCRINOLOGY CLINIC | Facility: CLINIC | Age: 41
End: 2021-12-16
Payer: COMMERCIAL

## 2021-12-16 VITALS
BODY MASS INDEX: 33.11 KG/M2 | RESPIRATION RATE: 16 BRPM | HEART RATE: 90 BPM | HEIGHT: 66 IN | WEIGHT: 206 LBS | DIASTOLIC BLOOD PRESSURE: 68 MMHG | SYSTOLIC BLOOD PRESSURE: 110 MMHG

## 2021-12-16 DIAGNOSIS — E11.29 TYPE 2 DIABETES MELLITUS WITH MICROALBUMINURIA, WITH LONG-TERM CURRENT USE OF INSULIN (HCC): Primary | ICD-10-CM

## 2021-12-16 DIAGNOSIS — R80.9 TYPE 2 DIABETES MELLITUS WITH MICROALBUMINURIA, WITH LONG-TERM CURRENT USE OF INSULIN (HCC): Primary | ICD-10-CM

## 2021-12-16 DIAGNOSIS — Z79.4 TYPE 2 DIABETES MELLITUS WITH MICROALBUMINURIA, WITH LONG-TERM CURRENT USE OF INSULIN (HCC): Primary | ICD-10-CM

## 2021-12-16 PROCEDURE — 83036 HEMOGLOBIN GLYCOSYLATED A1C: CPT | Performed by: NURSE PRACTITIONER

## 2021-12-16 PROCEDURE — 3074F SYST BP LT 130 MM HG: CPT | Performed by: NURSE PRACTITIONER

## 2021-12-16 PROCEDURE — 3008F BODY MASS INDEX DOCD: CPT | Performed by: NURSE PRACTITIONER

## 2021-12-16 PROCEDURE — 99213 OFFICE O/P EST LOW 20 MIN: CPT | Performed by: NURSE PRACTITIONER

## 2021-12-16 PROCEDURE — 3078F DIAST BP <80 MM HG: CPT | Performed by: NURSE PRACTITIONER

## 2021-12-16 RX ORDER — SEMAGLUTIDE 1.34 MG/ML
1 INJECTION, SOLUTION SUBCUTANEOUS WEEKLY
Qty: 9 ML | Refills: 1 | Status: SHIPPED | OUTPATIENT
Start: 2021-12-16 | End: 2022-01-07

## 2021-12-16 NOTE — PROGRESS NOTES
HPI:   Hugo Maciel is a 46year old male who presents for follow up for the management of his diabetes.      Patient presents with:  Diabetes: follow up Awa Pierre    Diabetes: stable, at goal  Type: 2   Duration:approximately 10 years  Current Meds: Met maternal grandmother; Fibromyalgia in his mother; Heart Attack in his paternal grandmother; Heart Disease in his father; diabetes mellitus in his father and mother; heart failure in his father; respitory failure in his maternal grandfather.    He  reports t MICROALBUMIN 4.1 01/22/2021 09:16 AM    MICROALBCREA 65.1 (H) 07/15/2021 07:37 AM        Lab results reviewed with patient.     REVIEW OF SYSTEMS:   GENERAL HEALTH: feels well otherwise  SKIN: denies any unusual skin lesions or rashes  RESPIRATORY: denies s Patient agreed to monitoring bid- fasting and 2 hours postprandial of one meal per day if not using CGM. Reinforced healthy eating in healthy portions and increasing daily physical activity.         As for his hypertension, Blood Pressure is well contro benefits of my recommendations, as well as other treatment options were discussed with the patient today. Questions were answered to the best of my knowledge.    25 min spent with patient and >50% time spent counseling and coordinating care related to their

## 2022-01-07 DIAGNOSIS — R80.9 TYPE 2 DIABETES MELLITUS WITH MICROALBUMINURIA, WITH LONG-TERM CURRENT USE OF INSULIN (HCC): ICD-10-CM

## 2022-01-07 DIAGNOSIS — E11.29 TYPE 2 DIABETES MELLITUS WITH MICROALBUMINURIA, WITH LONG-TERM CURRENT USE OF INSULIN (HCC): ICD-10-CM

## 2022-01-07 DIAGNOSIS — Z79.4 TYPE 2 DIABETES MELLITUS WITH MICROALBUMINURIA, WITH LONG-TERM CURRENT USE OF INSULIN (HCC): ICD-10-CM

## 2022-01-07 RX ORDER — SEMAGLUTIDE 1.34 MG/ML
1 INJECTION, SOLUTION SUBCUTANEOUS WEEKLY
Qty: 9 ML | Refills: 1 | Status: SHIPPED | OUTPATIENT
Start: 2022-01-07

## 2022-01-07 NOTE — TELEPHONE ENCOUNTER
Requested Prescriptions     Pending Prescriptions Disp Refills   • Semaglutide, 1 MG/DOSE, (OZEMPIC, 1 MG/DOSE,) 4 MG/3ML Subcutaneous Solution Pen-injector 9 mL 1     Sig: Inject 1 mg into the skin once a week.      FILLED- 12/16/21  LOV- 12/16/21  FOV- 06

## 2022-01-11 ENCOUNTER — TELEPHONE (OUTPATIENT)
Dept: ENDOCRINOLOGY CLINIC | Facility: CLINIC | Age: 42
End: 2022-01-11

## 2022-01-11 NOTE — TELEPHONE ENCOUNTER
Received request from [facility] for ozempic coverage. Completed and faxed to [phone].

## 2022-01-13 NOTE — TELEPHONE ENCOUNTER
Received fax from [place] pt is approved for ozempic from 1/11/22 to 1/13/2023.   Pt was informed sent to scan

## 2022-04-15 RX ORDER — METFORMIN HYDROCHLORIDE 500 MG/1
TABLET, EXTENDED RELEASE ORAL
Qty: 360 TABLET | Refills: 3 | Status: SHIPPED | OUTPATIENT
Start: 2022-04-15

## 2022-04-19 ENCOUNTER — PATIENT MESSAGE (OUTPATIENT)
Dept: ENDOCRINOLOGY CLINIC | Facility: CLINIC | Age: 42
End: 2022-04-19

## 2022-06-03 ENCOUNTER — PATIENT MESSAGE (OUTPATIENT)
Dept: ENDOCRINOLOGY CLINIC | Facility: CLINIC | Age: 42
End: 2022-06-03

## 2022-06-03 NOTE — TELEPHONE ENCOUNTER
From: Davy Clements  To: RAINER Poole  Sent: 6/3/2022 8:59 AM CDT  Subject: New insurance    Good morning. Not sure if it matters but I have a new insurance company and I am due for labs. Should I follow the same orders?  Not sure if my insurance company has a preference and they were not that helpful

## 2022-06-13 ENCOUNTER — TELEPHONE (OUTPATIENT)
Dept: ENDOCRINOLOGY CLINIC | Facility: CLINIC | Age: 42
End: 2022-06-13

## 2022-06-13 NOTE — TELEPHONE ENCOUNTER
Per patient, his preferred lab is now [facility] to lab without orders. Will fax to [phone], confirmed.

## 2022-06-16 LAB
AMB EXT BILIRUBIN, TOTAL: 0.3 MG/DL
AMB EXT BUN: 14 MG/DL
AMB EXT CALCIUM: 9.4
AMB EXT CARBON DIOXIDE: 23
AMB EXT CHLORIDE: 99
AMB EXT CHOLESTEROL, TOTAL: 171 MG/DL
AMB EXT CMP ALT: 43 U/L
AMB EXT CMP AST: 23 U/L
AMB EXT CREATININE, URINE: 188.8 MG/DL
AMB EXT CREATININE: 0.76 MG/DL
AMB EXT EGFR NON-AA: 117
AMB EXT GLUCOSE: 149 MG/DL
AMB EXT HDL CHOLESTEROL: 33 MG/DL
AMB EXT HGBA1C: 7.3 %
AMB EXT LDL CHOLESTEROL, DIRECT: 113 MG/DL
AMB EXT MALB/CRE CALC: 45 UG/MG
AMB EXT POSTASSIUM: 4.7 MMOL/L
AMB EXT SODIUM: 138 MMOL/L
AMB EXT TOTAL PROTEIN: 7.2
AMB EXT TRIGLYCERIDES: 136 MG/DL
AMB EXT VLDL: 25 MG/DL

## 2022-06-16 PROCEDURE — 3060F POS MICROALBUMINURIA REV: CPT | Performed by: NURSE PRACTITIONER

## 2022-06-16 PROCEDURE — 3051F HG A1C>EQUAL 7.0%<8.0%: CPT | Performed by: NURSE PRACTITIONER

## 2022-06-16 NOTE — TELEPHONE ENCOUNTER
From: Hugo Maciel  To: Miguelina Benitez, RAINER  Sent: 1/4/2021 12:23 PM CST  Subject: Prescription Question    Can I have my lisonipril and metformin [facility]?  I changed my insurance company and they requested these two to be ful
Diabetes regimen

## 2022-06-21 ENCOUNTER — OFFICE VISIT (OUTPATIENT)
Dept: ENDOCRINOLOGY CLINIC | Facility: CLINIC | Age: 42
End: 2022-06-21
Payer: COMMERCIAL

## 2022-06-21 VITALS
BODY MASS INDEX: 33.11 KG/M2 | HEIGHT: 66 IN | HEART RATE: 96 BPM | RESPIRATION RATE: 16 BRPM | DIASTOLIC BLOOD PRESSURE: 72 MMHG | WEIGHT: 206 LBS | SYSTOLIC BLOOD PRESSURE: 112 MMHG

## 2022-06-21 DIAGNOSIS — R80.9 TYPE 2 DIABETES MELLITUS WITH MICROALBUMINURIA, WITHOUT LONG-TERM CURRENT USE OF INSULIN (HCC): Primary | ICD-10-CM

## 2022-06-21 DIAGNOSIS — E11.29 TYPE 2 DIABETES MELLITUS WITH MICROALBUMINURIA, WITHOUT LONG-TERM CURRENT USE OF INSULIN (HCC): Primary | ICD-10-CM

## 2022-06-21 DIAGNOSIS — E78.2 MIXED HYPERLIPIDEMIA: ICD-10-CM

## 2022-06-21 DIAGNOSIS — E66.09 CLASS 1 OBESITY DUE TO EXCESS CALORIES WITH SERIOUS COMORBIDITY AND BODY MASS INDEX (BMI) OF 33.0 TO 33.9 IN ADULT: ICD-10-CM

## 2022-06-21 LAB
CARTRIDGE LOT#: 896 NUMERIC
HEMOGLOBIN A1C: 6.9 % (ref 4.3–5.6)

## 2022-06-21 PROCEDURE — 99214 OFFICE O/P EST MOD 30 MIN: CPT | Performed by: NURSE PRACTITIONER

## 2022-06-21 PROCEDURE — 3008F BODY MASS INDEX DOCD: CPT | Performed by: NURSE PRACTITIONER

## 2022-06-21 PROCEDURE — 3078F DIAST BP <80 MM HG: CPT | Performed by: NURSE PRACTITIONER

## 2022-06-21 PROCEDURE — 3074F SYST BP LT 130 MM HG: CPT | Performed by: NURSE PRACTITIONER

## 2022-06-21 PROCEDURE — 95251 CONT GLUC MNTR ANALYSIS I&R: CPT | Performed by: NURSE PRACTITIONER

## 2022-06-21 PROCEDURE — 83036 HEMOGLOBIN GLYCOSYLATED A1C: CPT | Performed by: NURSE PRACTITIONER

## 2022-06-21 PROCEDURE — 3044F HG A1C LEVEL LT 7.0%: CPT | Performed by: NURSE PRACTITIONER

## 2022-06-21 RX ORDER — SEMAGLUTIDE 1.34 MG/ML
1 INJECTION, SOLUTION SUBCUTANEOUS WEEKLY
Qty: 9 ML | Refills: 1 | Status: SHIPPED | OUTPATIENT
Start: 2022-06-21

## 2022-06-21 RX ORDER — LISINOPRIL 2.5 MG/1
2.5 TABLET ORAL DAILY
Qty: 90 TABLET | Refills: 3 | Status: SHIPPED | OUTPATIENT
Start: 2022-06-21

## 2022-06-21 RX ORDER — FLASH GLUCOSE SENSOR
1 KIT MISCELLANEOUS
Qty: 2 EACH | Refills: 11 | Status: SHIPPED | OUTPATIENT
Start: 2022-06-21

## 2022-06-21 RX ORDER — FLASH GLUCOSE SCANNING READER
1 EACH MISCELLANEOUS ONCE
Qty: 1 EACH | Refills: 0 | Status: SHIPPED | OUTPATIENT
Start: 2022-06-21 | End: 2022-06-21

## 2022-06-21 RX ORDER — METFORMIN HYDROCHLORIDE 500 MG/1
1000 TABLET, EXTENDED RELEASE ORAL 2 TIMES DAILY WITH MEALS
Qty: 360 TABLET | Refills: 3 | Status: SHIPPED | OUTPATIENT
Start: 2022-06-21

## 2022-06-21 NOTE — PATIENT INSTRUCTIONS
We are here to support you with Diabetes but please remember that you still need your primary care doctor for your routine health maintenance. Your current A1C: 6.9%  This test provides us with your average blood sugar for the past 3 months. The main goal of diabetes treatment is to keep your sugar from going too high. We measure your overall blood sugar trends with a Hemoglobin A1C test. (also called an A1C)  For most people the target is less than 7.0% but sometimes we make exceptions based on age, health history and other factors. Keeping an A1C less than 7% helps prevents diabetes related health problems. If your A1C goes too high, then we need to talk about changing your current diabetes treatment. MEDICATIONS:   It is important to take all of your medications as prescribed. Please call me if you cannot get the prescriptions filled or are having issues with refills. Also, please call me if you have any issues with medication questions, side effects, dosing questions or problems with your blood sugar trends BEFORE CHANGING OR STOPPING ANY MEDICATIONS. Continue Metformin ER 1000mg 2x/day with breakfast and dinner  Continue Ozempic 1mg injection ONCE WEEKLY    Blood sugar testing:   Always bring your glucose meter or blood sugar logbook to every appointment here at the diabetes center. This allows me to safely make adjustments to your diabetes plan. In order for me to determine any patterns in your blood sugars, you will need to continue to use personal Freestyle Angela CGM or test your blood sugar 2 times daily     Blood sugar targets:  Before breakfast:   (preferably < 110)  2 hours After meals: less than 180 (preferably less than 150)   Call for persistent blood sugars < 75 or > 200. Blood sugars greater than 200 are not acceptable to reach your goal of improving diabetes      Health Maintenance:   1.  LABS: It is important to monitor your kidney function (blood and urine protein levels) , liver function tests and cholesterol levels when you have diabetes. 2. FOOT EXAMS:  daily foot inspections for foot wounds or skin changes are important for foot care. Any unusual changes should be reported immediately. 3. EYE EXAMS: Checking your eyes for diabetes changes is important and you should have a dilated eye exam done by an eye doctor EVERY year since these changes occur in the BACK of the eye and not visible by you. Please let me know if you need provider list for eye doctor. Lifestyle Therapy:    1. NUTRITION: Maintain optimal weight, calorie restriction if overweight, plant-based diet    2. PHYSICAL ACTIVITY: 150 minutes per week (30 minutes a day 5 days a week) of moderate exertion such as walking, stair climbing. Include strength training 2-3 times per week. Increase as tolerated. 3. SLEEP: Try and get 7-8 hours of sleep per night    4. BEHAVIOR:  Tobacco cessation and alcohol in moderation      Reminders:  Meet with Tomy Burgos - dietitilito  Meet with [address] Phone: [phone]   Due for dilated eye exam  Send lab results or have done      Ruddy 14, BC-ADM, [facility]  [address] [facility], [facility] [address], [address]  [address], [address] [place] Alberto, [address]  [address], [address]  Diabetes Services at [address] [facility] [address]

## 2022-06-22 ENCOUNTER — TELEPHONE (OUTPATIENT)
Dept: ENDOCRINOLOGY CLINIC | Facility: CLINIC | Age: 42
End: 2022-06-22

## 2022-06-23 ENCOUNTER — PATIENT MESSAGE (OUTPATIENT)
Dept: ENDOCRINOLOGY CLINIC | Facility: CLINIC | Age: 42
End: 2022-06-23

## 2022-06-23 ENCOUNTER — TELEMEDICINE (OUTPATIENT)
Dept: ENDOCRINOLOGY CLINIC | Facility: CLINIC | Age: 42
End: 2022-06-23

## 2022-06-23 DIAGNOSIS — R80.9 TYPE 2 DIABETES MELLITUS WITH MICROALBUMINURIA, WITHOUT LONG-TERM CURRENT USE OF INSULIN (HCC): Primary | ICD-10-CM

## 2022-06-23 DIAGNOSIS — E11.29 TYPE 2 DIABETES MELLITUS WITH MICROALBUMINURIA, WITHOUT LONG-TERM CURRENT USE OF INSULIN (HCC): Primary | ICD-10-CM

## 2022-06-23 PROCEDURE — 97802 MEDICAL NUTRITION INDIV IN: CPT | Performed by: DIETITIAN, REGISTERED

## 2022-07-05 ENCOUNTER — PATIENT MESSAGE (OUTPATIENT)
Dept: ENDOCRINOLOGY CLINIC | Facility: CLINIC | Age: 42
End: 2022-07-05

## 2022-07-05 DIAGNOSIS — E78.2 MIXED HYPERLIPIDEMIA: Primary | ICD-10-CM

## 2022-07-05 RX ORDER — ATORVASTATIN CALCIUM 10 MG/1
10 TABLET, FILM COATED ORAL NIGHTLY
Qty: 30 TABLET | Refills: 2 | Status: SHIPPED | OUTPATIENT
Start: 2022-07-05

## 2022-07-05 NOTE — TELEPHONE ENCOUNTER
From: Fred Thomas  To: RAINER Huang  Sent: 7/5/2022 11:41 AM CDT  Subject: Question regarding Lab Result    Was there still another medication that will be prescribed? I thought after the labs there was another possible medication?

## 2022-07-05 NOTE — TELEPHONE ENCOUNTER
Labs reviewed. Per ACC/AHA/ADA guidelines patient will start on moderate intensity statin therapy - atorvastatin 10mg po @ bedtime sent to local pharmacy.     Sabino HERBERT, BC-ADM, [facility]

## 2022-07-31 ENCOUNTER — OFFICE VISIT (OUTPATIENT)
Dept: FAMILY MEDICINE CLINIC | Facility: CLINIC | Age: 42
End: 2022-07-31
Payer: COMMERCIAL

## 2022-07-31 VITALS
BODY MASS INDEX: 28.92 KG/M2 | RESPIRATION RATE: 18 BRPM | HEIGHT: 70 IN | SYSTOLIC BLOOD PRESSURE: 110 MMHG | HEART RATE: 78 BPM | OXYGEN SATURATION: 98 % | DIASTOLIC BLOOD PRESSURE: 70 MMHG | TEMPERATURE: 98 F | WEIGHT: 202 LBS

## 2022-07-31 DIAGNOSIS — J01.00 ACUTE MAXILLARY SINUSITIS, RECURRENCE NOT SPECIFIED: Primary | ICD-10-CM

## 2022-07-31 PROCEDURE — 3078F DIAST BP <80 MM HG: CPT | Performed by: NURSE PRACTITIONER

## 2022-07-31 PROCEDURE — 3008F BODY MASS INDEX DOCD: CPT | Performed by: NURSE PRACTITIONER

## 2022-07-31 PROCEDURE — 3074F SYST BP LT 130 MM HG: CPT | Performed by: NURSE PRACTITIONER

## 2022-07-31 PROCEDURE — 99213 OFFICE O/P EST LOW 20 MIN: CPT | Performed by: NURSE PRACTITIONER

## 2022-07-31 RX ORDER — DOXYCYCLINE HYCLATE 100 MG
100 TABLET ORAL 2 TIMES DAILY
Qty: 14 TABLET | Refills: 0 | Status: SHIPPED | OUTPATIENT
Start: 2022-07-31 | End: 2022-08-07

## 2022-07-31 RX ORDER — ALBUTEROL SULFATE 90 UG/1
2 AEROSOL, METERED RESPIRATORY (INHALATION) EVERY 4 HOURS PRN
Qty: 1 EACH | Refills: 0 | Status: SHIPPED | OUTPATIENT
Start: 2022-07-31 | End: 2022-08-14

## 2022-08-05 ENCOUNTER — PATIENT MESSAGE (OUTPATIENT)
Dept: ENDOCRINOLOGY CLINIC | Facility: CLINIC | Age: 42
End: 2022-08-05

## 2022-08-15 ENCOUNTER — TELEMEDICINE (OUTPATIENT)
Dept: FAMILY MEDICINE CLINIC | Facility: CLINIC | Age: 42
End: 2022-08-15

## 2022-08-15 DIAGNOSIS — U07.1 COVID-19: Primary | ICD-10-CM

## 2022-08-15 DIAGNOSIS — R80.9 TYPE 2 DIABETES MELLITUS WITH MICROALBUMINURIA, WITH LONG-TERM CURRENT USE OF INSULIN (HCC): ICD-10-CM

## 2022-08-15 DIAGNOSIS — E11.29 TYPE 2 DIABETES MELLITUS WITH MICROALBUMINURIA, WITH LONG-TERM CURRENT USE OF INSULIN (HCC): ICD-10-CM

## 2022-08-15 DIAGNOSIS — R68.89 FLU-LIKE SYMPTOMS: ICD-10-CM

## 2022-08-15 DIAGNOSIS — Z79.4 TYPE 2 DIABETES MELLITUS WITH MICROALBUMINURIA, WITH LONG-TERM CURRENT USE OF INSULIN (HCC): ICD-10-CM

## 2022-08-15 PROCEDURE — 99214 OFFICE O/P EST MOD 30 MIN: CPT | Performed by: FAMILY MEDICINE

## 2022-09-17 ENCOUNTER — LAB ENCOUNTER (OUTPATIENT)
Dept: LAB | Age: 42
End: 2022-09-17
Attending: NURSE PRACTITIONER
Payer: COMMERCIAL

## 2022-09-17 LAB
ALBUMIN SERPL-MCNC: 3.6 G/DL (ref 3.4–5)
ALBUMIN/GLOB SERPL: 0.9 {RATIO} (ref 1–2)
ALP LIVER SERPL-CCNC: 68 U/L
ALT SERPL-CCNC: 46 U/L
ANION GAP SERPL CALC-SCNC: 7 MMOL/L (ref 0–18)
AST SERPL-CCNC: 11 U/L (ref 15–37)
BILIRUB SERPL-MCNC: 0.3 MG/DL (ref 0.1–2)
BUN BLD-MCNC: 17 MG/DL (ref 7–18)
CALCIUM BLD-MCNC: 9.5 MG/DL (ref 8.5–10.1)
CHLORIDE SERPL-SCNC: 103 MMOL/L (ref 98–112)
CHOLEST SERPL-MCNC: 124 MG/DL (ref ?–200)
CO2 SERPL-SCNC: 27 MMOL/L (ref 21–32)
CREAT BLD-MCNC: 0.77 MG/DL
CREAT UR-SCNC: 144 MG/DL
FASTING PATIENT LIPID ANSWER: YES
FASTING STATUS PATIENT QL REPORTED: YES
GFR SERPLBLD BASED ON 1.73 SQ M-ARVRAT: 116 ML/MIN/1.73M2 (ref 60–?)
GLOBULIN PLAS-MCNC: 4.2 G/DL (ref 2.8–4.4)
GLUCOSE BLD-MCNC: 162 MG/DL (ref 70–99)
HDLC SERPL-MCNC: 29 MG/DL (ref 40–59)
LDLC SERPL CALC-MCNC: 65 MG/DL (ref ?–100)
MICROALBUMIN UR-MCNC: 10.4 MG/DL
MICROALBUMIN/CREAT 24H UR-RTO: 72.2 UG/MG (ref ?–30)
NONHDLC SERPL-MCNC: 95 MG/DL (ref ?–130)
OSMOLALITY SERPL CALC.SUM OF ELEC: 289 MOSM/KG (ref 275–295)
POTASSIUM SERPL-SCNC: 4 MMOL/L (ref 3.5–5.1)
PROT SERPL-MCNC: 7.8 G/DL (ref 6.4–8.2)
SODIUM SERPL-SCNC: 137 MMOL/L (ref 136–145)
TRIGL SERPL-MCNC: 178 MG/DL (ref 30–149)
VLDLC SERPL CALC-MCNC: 27 MG/DL (ref 0–30)

## 2022-09-17 PROCEDURE — 3060F POS MICROALBUMINURIA REV: CPT | Performed by: FAMILY MEDICINE

## 2022-09-17 PROCEDURE — 3061F NEG MICROALBUMINURIA REV: CPT | Performed by: FAMILY MEDICINE

## 2022-09-20 ENCOUNTER — TELEPHONE (OUTPATIENT)
Dept: ENDOCRINOLOGY CLINIC | Facility: CLINIC | Age: 42
End: 2022-09-20

## 2022-09-21 ENCOUNTER — OFFICE VISIT (OUTPATIENT)
Dept: ENDOCRINOLOGY CLINIC | Facility: CLINIC | Age: 42
End: 2022-09-21

## 2022-09-21 VITALS
RESPIRATION RATE: 16 BRPM | HEART RATE: 97 BPM | BODY MASS INDEX: 28.49 KG/M2 | SYSTOLIC BLOOD PRESSURE: 114 MMHG | DIASTOLIC BLOOD PRESSURE: 74 MMHG | WEIGHT: 199 LBS | HEIGHT: 70 IN

## 2022-09-21 DIAGNOSIS — E11.29 TYPE 2 DIABETES MELLITUS WITH MICROALBUMINURIA, WITHOUT LONG-TERM CURRENT USE OF INSULIN (HCC): Primary | ICD-10-CM

## 2022-09-21 DIAGNOSIS — R80.9 TYPE 2 DIABETES MELLITUS WITH MICROALBUMINURIA, WITHOUT LONG-TERM CURRENT USE OF INSULIN (HCC): Primary | ICD-10-CM

## 2022-09-21 LAB
CARTRIDGE LOT#: 992 NUMERIC
HEMOGLOBIN A1C: 6.7 % (ref 4.3–5.6)

## 2022-09-21 PROCEDURE — 3074F SYST BP LT 130 MM HG: CPT | Performed by: NURSE PRACTITIONER

## 2022-09-21 PROCEDURE — 3078F DIAST BP <80 MM HG: CPT | Performed by: NURSE PRACTITIONER

## 2022-09-21 PROCEDURE — 3044F HG A1C LEVEL LT 7.0%: CPT | Performed by: FAMILY MEDICINE

## 2022-09-21 PROCEDURE — 83036 HEMOGLOBIN GLYCOSYLATED A1C: CPT | Performed by: NURSE PRACTITIONER

## 2022-09-21 PROCEDURE — 3008F BODY MASS INDEX DOCD: CPT | Performed by: NURSE PRACTITIONER

## 2022-09-21 PROCEDURE — 99213 OFFICE O/P EST LOW 20 MIN: CPT | Performed by: NURSE PRACTITIONER

## 2022-09-21 RX ORDER — SEMAGLUTIDE 2.68 MG/ML
2 INJECTION, SOLUTION SUBCUTANEOUS WEEKLY
Qty: 3 ML | Refills: 1 | COMMUNITY
Start: 2022-09-21

## 2022-09-21 NOTE — PATIENT INSTRUCTIONS
We are here to support you with Diabetes but please remember that you still need your primary care doctor for your routine health maintenance. Your current A1C: 6.9%  This test provides us with your average blood sugar for the past 3 months. The main goal of diabetes treatment is to keep your sugar from going too high. We measure your overall blood sugar trends with a Hemoglobin A1C test. (also called an A1C)  For most people the target is less than 7.0% but sometimes we make exceptions based on age, health history and other factors. Keeping an A1C less than 7% helps prevents diabetes related health problems. If your A1C goes too high, then we need to talk about changing your current diabetes treatment. MEDICATIONS:   It is important to take all of your medications as prescribed. Please call me if you cannot get the prescriptions filled or are having issues with refills. Also, please call me if you have any issues with medication questions, side effects, dosing questions or problems with your blood sugar trends BEFORE CHANGING OR STOPPING ANY MEDICATIONS. Continue Metformin ER 1000mg 2x/day with breakfast and dinner  Increase Ozempic to 2mg injection WEEKLY    Blood sugar testing:   Always bring your glucose meter or blood sugar logbook to every appointment here at the diabetes center. This allows me to safely make adjustments to your diabetes plan. In order for me to determine any patterns in your blood sugars, you will need to continue to use personal Freestyle Angela CGM or test your blood sugar 2 times daily     Blood sugar targets:  Before breakfast:   (preferably < 110)  2 hours After meals: less than 180 (preferably less than 150)   Call for persistent blood sugars < 75 or > 200. Blood sugars greater than 200 are not acceptable to reach your goal of improving diabetes      Health Maintenance:   1.  LABS: It is important to monitor your kidney function (blood and urine protein levels) , liver function tests and cholesterol levels when you have diabetes. 2. FOOT EXAMS:  daily foot inspections for foot wounds or skin changes are important for foot care. Any unusual changes should be reported immediately. 3. EYE EXAMS: Checking your eyes for diabetes changes is important and you should have a dilated eye exam done by an eye doctor EVERY year since these changes occur in the BACK of the eye and not visible by you. Please let me know if you need provider list for eye doctor. Lifestyle Therapy:    1. NUTRITION: Maintain optimal weight, calorie restriction if overweight, plant-based diet    2. PHYSICAL ACTIVITY: 150 minutes per week (30 minutes a day 5 days a week) of moderate exertion such as walking, stair climbing. Include strength training 2-3 times per week. Increase as tolerated. 3. SLEEP: Try and get 7-8 hours of sleep per night    4. BEHAVIOR:  Tobacco cessation and alcohol in moderation      Cinthia HERBERT, BC-ADM, [facility]  [address], [address], [address]  [address] [place] [address], [address] [facility], [address]  [address]  Maria De Jesus [address]acios, [address] [place] [address]  Diabetes Services at [address] [facility] [address]

## 2022-10-11 ENCOUNTER — PATIENT MESSAGE (OUTPATIENT)
Dept: FAMILY MEDICINE CLINIC | Facility: CLINIC | Age: 42
End: 2022-10-11

## 2022-10-11 NOTE — TELEPHONE ENCOUNTER
From: Yanez Police  To: Johanna Lyons MD  Sent: 10/11/2022 4:36 PM CDT  Subject: Question    I have a brown patch of skin on my fave that I have noticed is expanding. Would I first go to you? Or would I need to see a specialist? Thanks for any guidance!

## 2022-10-17 DIAGNOSIS — E78.2 MIXED HYPERLIPIDEMIA: ICD-10-CM

## 2022-10-18 RX ORDER — ATORVASTATIN CALCIUM 10 MG/1
TABLET, FILM COATED ORAL
Qty: 30 TABLET | Refills: 2 | Status: SHIPPED | OUTPATIENT
Start: 2022-10-18

## 2022-10-23 ENCOUNTER — PATIENT MESSAGE (OUTPATIENT)
Dept: ENDOCRINOLOGY CLINIC | Facility: CLINIC | Age: 42
End: 2022-10-23

## 2022-10-24 RX ORDER — BLOOD-GLUCOSE SENSOR
1 EACH MISCELLANEOUS
Qty: 2 EACH | Refills: 5 | Status: SHIPPED | OUTPATIENT
Start: 2022-10-24

## 2022-10-24 NOTE — TELEPHONE ENCOUNTER
From: Fred Thomas  To: Diogo HilloleRAINER  Sent: 10/23/2022 12:30 PM CDT  Subject: Switch in sensors    Could my prescription be changed to the [place] 3 sensors?

## 2022-10-25 ENCOUNTER — OFFICE VISIT (OUTPATIENT)
Dept: FAMILY MEDICINE CLINIC | Facility: CLINIC | Age: 42
End: 2022-10-25
Payer: COMMERCIAL

## 2022-10-25 VITALS
OXYGEN SATURATION: 100 % | HEIGHT: 70 IN | HEART RATE: 84 BPM | WEIGHT: 201 LBS | RESPIRATION RATE: 16 BRPM | BODY MASS INDEX: 28.77 KG/M2

## 2022-10-25 DIAGNOSIS — R80.9 TYPE 2 DIABETES MELLITUS WITH MICROALBUMINURIA, WITH LONG-TERM CURRENT USE OF INSULIN (HCC): ICD-10-CM

## 2022-10-25 DIAGNOSIS — J30.2 SEASONAL ALLERGIES: ICD-10-CM

## 2022-10-25 DIAGNOSIS — Z00.00 ANNUAL PHYSICAL EXAM: Primary | ICD-10-CM

## 2022-10-25 DIAGNOSIS — E78.2 MIXED HYPERLIPIDEMIA: ICD-10-CM

## 2022-10-25 DIAGNOSIS — Z79.4 TYPE 2 DIABETES MELLITUS WITH MICROALBUMINURIA, WITH LONG-TERM CURRENT USE OF INSULIN (HCC): ICD-10-CM

## 2022-10-25 DIAGNOSIS — E11.29 TYPE 2 DIABETES MELLITUS WITH MICROALBUMINURIA, WITH LONG-TERM CURRENT USE OF INSULIN (HCC): ICD-10-CM

## 2022-10-25 DIAGNOSIS — R21 MACULAR RASH: ICD-10-CM

## 2022-10-25 PROCEDURE — 99214 OFFICE O/P EST MOD 30 MIN: CPT | Performed by: FAMILY MEDICINE

## 2022-10-25 PROCEDURE — 3008F BODY MASS INDEX DOCD: CPT | Performed by: FAMILY MEDICINE

## 2022-10-25 PROCEDURE — 99396 PREV VISIT EST AGE 40-64: CPT | Performed by: FAMILY MEDICINE

## 2022-11-21 DIAGNOSIS — E11.29 TYPE 2 DIABETES MELLITUS WITH MICROALBUMINURIA, WITHOUT LONG-TERM CURRENT USE OF INSULIN (HCC): Primary | ICD-10-CM

## 2022-11-21 DIAGNOSIS — R80.9 TYPE 2 DIABETES MELLITUS WITH MICROALBUMINURIA, WITHOUT LONG-TERM CURRENT USE OF INSULIN (HCC): Primary | ICD-10-CM

## 2022-11-22 RX ORDER — SEMAGLUTIDE 2.68 MG/ML
2 INJECTION, SOLUTION SUBCUTANEOUS WEEKLY
Qty: 3 ML | Refills: 1 | Status: SHIPPED | OUTPATIENT
Start: 2022-11-22

## 2023-01-06 ENCOUNTER — TELEPHONE (OUTPATIENT)
Dept: ENDOCRINOLOGY CLINIC | Facility: CLINIC | Age: 43
End: 2023-01-06

## 2023-01-07 DIAGNOSIS — R80.9 TYPE 2 DIABETES MELLITUS WITH MICROALBUMINURIA, WITHOUT LONG-TERM CURRENT USE OF INSULIN (HCC): ICD-10-CM

## 2023-01-07 DIAGNOSIS — E11.29 TYPE 2 DIABETES MELLITUS WITH MICROALBUMINURIA, WITHOUT LONG-TERM CURRENT USE OF INSULIN (HCC): ICD-10-CM

## 2023-01-07 RX ORDER — SEMAGLUTIDE 2.68 MG/ML
2 INJECTION, SOLUTION SUBCUTANEOUS WEEKLY
Qty: 3 ML | Refills: 1 | Status: CANCELLED | OUTPATIENT
Start: 2023-01-07

## 2023-01-10 ENCOUNTER — PATIENT MESSAGE (OUTPATIENT)
Dept: ENDOCRINOLOGY CLINIC | Facility: CLINIC | Age: 43
End: 2023-01-10

## 2023-01-12 ENCOUNTER — NURSE ONLY (OUTPATIENT)
Dept: ENDOCRINOLOGY CLINIC | Facility: CLINIC | Age: 43
End: 2023-01-12
Payer: COMMERCIAL

## 2023-01-12 DIAGNOSIS — E11.29 TYPE 2 DIABETES MELLITUS WITH MICROALBUMINURIA, WITHOUT LONG-TERM CURRENT USE OF INSULIN (HCC): Primary | ICD-10-CM

## 2023-01-12 DIAGNOSIS — R80.9 TYPE 2 DIABETES MELLITUS WITH MICROALBUMINURIA, WITHOUT LONG-TERM CURRENT USE OF INSULIN (HCC): Primary | ICD-10-CM

## 2023-01-12 RX ORDER — SEMAGLUTIDE 2.68 MG/ML
INJECTION, SOLUTION SUBCUTANEOUS
Qty: 3 ML | Refills: 0 | COMMUNITY
Start: 2023-01-12

## 2023-01-12 NOTE — TELEPHONE ENCOUNTER
Called insurance to correct the original PA. They restarted the PA and was approved but for the wrong dose. Spoke with PA department again to correct the dosage.      Pt is approved from 1/12/23 through 1/12/24    Pharmacy and pt are aware

## 2023-01-17 DIAGNOSIS — E11.29 TYPE 2 DIABETES MELLITUS WITH MICROALBUMINURIA, WITHOUT LONG-TERM CURRENT USE OF INSULIN (HCC): ICD-10-CM

## 2023-01-17 DIAGNOSIS — R80.9 TYPE 2 DIABETES MELLITUS WITH MICROALBUMINURIA, WITHOUT LONG-TERM CURRENT USE OF INSULIN (HCC): ICD-10-CM

## 2023-01-17 RX ORDER — SEMAGLUTIDE 2.68 MG/ML
INJECTION, SOLUTION SUBCUTANEOUS
Qty: 3 ML | Refills: 0 | Status: CANCELLED | OUTPATIENT
Start: 2023-01-17

## 2023-01-21 ENCOUNTER — PATIENT MESSAGE (OUTPATIENT)
Dept: ENDOCRINOLOGY CLINIC | Facility: CLINIC | Age: 43
End: 2023-01-21

## 2023-01-23 NOTE — TELEPHONE ENCOUNTER
From: Anup Lux  To: RAINER Matamoros  Sent: 1/21/2023 1:52 PM CST  Subject: Atorvastatin    I started eating grapefruits as a way to settle my sweet tooth. Do I need to be concerned?  It states to ask you if it is ok for me to eat grapefruits

## 2023-02-27 ENCOUNTER — OFFICE VISIT (OUTPATIENT)
Dept: ENDOCRINOLOGY CLINIC | Facility: CLINIC | Age: 43
End: 2023-02-27
Payer: COMMERCIAL

## 2023-02-27 VITALS
WEIGHT: 199 LBS | RESPIRATION RATE: 16 BRPM | HEART RATE: 90 BPM | HEIGHT: 70 IN | DIASTOLIC BLOOD PRESSURE: 82 MMHG | BODY MASS INDEX: 28.49 KG/M2 | SYSTOLIC BLOOD PRESSURE: 106 MMHG

## 2023-02-27 DIAGNOSIS — E78.2 MIXED HYPERLIPIDEMIA: ICD-10-CM

## 2023-02-27 DIAGNOSIS — R80.9 TYPE 2 DIABETES MELLITUS WITH MICROALBUMINURIA, WITHOUT LONG-TERM CURRENT USE OF INSULIN (HCC): Primary | ICD-10-CM

## 2023-02-27 DIAGNOSIS — E11.29 TYPE 2 DIABETES MELLITUS WITH MICROALBUMINURIA, WITHOUT LONG-TERM CURRENT USE OF INSULIN (HCC): Primary | ICD-10-CM

## 2023-02-27 DIAGNOSIS — R80.9 MICROALBUMINURIA: ICD-10-CM

## 2023-02-27 LAB
CARTRIDGE LOT#: 30 NUMERIC
HEMOGLOBIN A1C: 6.7 % (ref 4.3–5.6)

## 2023-02-27 PROCEDURE — 3044F HG A1C LEVEL LT 7.0%: CPT | Performed by: FAMILY MEDICINE

## 2023-02-27 RX ORDER — ATORVASTATIN CALCIUM 10 MG/1
10 TABLET, FILM COATED ORAL NIGHTLY
Qty: 90 TABLET | Refills: 3 | Status: SHIPPED | OUTPATIENT
Start: 2023-02-27

## 2023-03-02 ENCOUNTER — APPOINTMENT (OUTPATIENT)
Dept: URBAN - METROPOLITAN AREA CLINIC 247 | Age: 43
Setting detail: DERMATOLOGY
End: 2023-03-02

## 2023-03-02 DIAGNOSIS — L82.1 OTHER SEBORRHEIC KERATOSIS: ICD-10-CM

## 2023-03-02 PROCEDURE — OTHER COUNSELING: OTHER

## 2023-03-02 PROCEDURE — 99202 OFFICE O/P NEW SF 15 MIN: CPT

## 2023-03-02 PROCEDURE — OTHER DEFER: OTHER

## 2023-03-02 PROCEDURE — OTHER MIPS QUALITY: OTHER

## 2023-03-02 ASSESSMENT — LOCATION DETAILED DESCRIPTION DERM: LOCATION DETAILED: RIGHT INFERIOR CENTRAL MALAR CHEEK

## 2023-03-02 ASSESSMENT — LOCATION ZONE DERM: LOCATION ZONE: FACE

## 2023-03-02 ASSESSMENT — LOCATION SIMPLE DESCRIPTION DERM: LOCATION SIMPLE: RIGHT CHEEK

## 2023-03-06 DIAGNOSIS — E11.29 TYPE 2 DIABETES MELLITUS WITH MICROALBUMINURIA, WITHOUT LONG-TERM CURRENT USE OF INSULIN (HCC): ICD-10-CM

## 2023-03-06 DIAGNOSIS — R80.9 TYPE 2 DIABETES MELLITUS WITH MICROALBUMINURIA, WITHOUT LONG-TERM CURRENT USE OF INSULIN (HCC): ICD-10-CM

## 2023-03-07 RX ORDER — SEMAGLUTIDE 2.68 MG/ML
INJECTION, SOLUTION SUBCUTANEOUS
Qty: 9 ML | Refills: 1 | Status: SHIPPED | OUTPATIENT
Start: 2023-03-07

## 2023-03-21 ENCOUNTER — OFFICE VISIT (OUTPATIENT)
Dept: FAMILY MEDICINE CLINIC | Facility: CLINIC | Age: 43
End: 2023-03-21
Payer: COMMERCIAL

## 2023-03-21 VITALS
BODY MASS INDEX: 28.63 KG/M2 | HEIGHT: 70 IN | HEART RATE: 87 BPM | RESPIRATION RATE: 16 BRPM | DIASTOLIC BLOOD PRESSURE: 80 MMHG | OXYGEN SATURATION: 99 % | SYSTOLIC BLOOD PRESSURE: 120 MMHG | WEIGHT: 200 LBS

## 2023-03-21 DIAGNOSIS — E11.29 TYPE 2 DIABETES MELLITUS WITH MICROALBUMINURIA, WITH LONG-TERM CURRENT USE OF INSULIN (HCC): Primary | ICD-10-CM

## 2023-03-21 DIAGNOSIS — Z79.4 TYPE 2 DIABETES MELLITUS WITH MICROALBUMINURIA, WITH LONG-TERM CURRENT USE OF INSULIN (HCC): Primary | ICD-10-CM

## 2023-03-21 DIAGNOSIS — R06.09 DOE (DYSPNEA ON EXERTION): ICD-10-CM

## 2023-03-21 DIAGNOSIS — R80.9 TYPE 2 DIABETES MELLITUS WITH MICROALBUMINURIA, WITH LONG-TERM CURRENT USE OF INSULIN (HCC): Primary | ICD-10-CM

## 2023-03-21 DIAGNOSIS — E78.2 MIXED HYPERLIPIDEMIA: ICD-10-CM

## 2023-03-21 PROCEDURE — 99214 OFFICE O/P EST MOD 30 MIN: CPT | Performed by: FAMILY MEDICINE

## 2023-03-21 PROCEDURE — 3008F BODY MASS INDEX DOCD: CPT | Performed by: FAMILY MEDICINE

## 2023-03-21 PROCEDURE — 3079F DIAST BP 80-89 MM HG: CPT | Performed by: FAMILY MEDICINE

## 2023-03-21 PROCEDURE — 3074F SYST BP LT 130 MM HG: CPT | Performed by: FAMILY MEDICINE

## 2023-04-11 ENCOUNTER — HOSPITAL ENCOUNTER (OUTPATIENT)
Dept: CV DIAGNOSTICS | Age: 43
Discharge: HOME | End: 2023-04-11
Attending: FAMILY MEDICINE
Payer: COMMERCIAL

## 2023-04-11 DIAGNOSIS — E11.29 TYPE 2 DIABETES MELLITUS WITH MICROALBUMINURIA, WITH LONG-TERM CURRENT USE OF INSULIN (HCC): ICD-10-CM

## 2023-04-11 DIAGNOSIS — R80.9 TYPE 2 DIABETES MELLITUS WITH MICROALBUMINURIA, WITH LONG-TERM CURRENT USE OF INSULIN (HCC): ICD-10-CM

## 2023-04-11 DIAGNOSIS — Z79.4 TYPE 2 DIABETES MELLITUS WITH MICROALBUMINURIA, WITH LONG-TERM CURRENT USE OF INSULIN (HCC): ICD-10-CM

## 2023-04-11 DIAGNOSIS — R06.09 DOE (DYSPNEA ON EXERTION): ICD-10-CM

## 2023-04-11 DIAGNOSIS — E78.2 MIXED HYPERLIPIDEMIA: ICD-10-CM

## 2023-04-11 PROCEDURE — 93306 TTE W/DOPPLER COMPLETE: CPT | Performed by: FAMILY MEDICINE

## 2023-04-26 ENCOUNTER — PATIENT MESSAGE (OUTPATIENT)
Dept: FAMILY MEDICINE CLINIC | Facility: CLINIC | Age: 43
End: 2023-04-26

## 2023-04-26 DIAGNOSIS — F98.8 ATTENTION DEFICIT DISORDER: ICD-10-CM

## 2023-04-27 NOTE — TELEPHONE ENCOUNTER
Pt asks if he may continue taking ADHD medication. And if so can we send a prescription to [facility] though  I'm not seeing a recent Rx.     -Also asks if it is ok to use minoxidil?     LOV 03/21/23 Please advise

## 2023-05-01 RX ORDER — DEXTROAMPHETAMINE SACCHARATE, AMPHETAMINE ASPARTATE MONOHYDRATE, DEXTROAMPHETAMINE SULFATE AND AMPHETAMINE SULFATE 5; 5; 5; 5 MG/1; MG/1; MG/1; MG/1
20 CAPSULE, EXTENDED RELEASE ORAL DAILY
Qty: 30 CAPSULE | Refills: 0 | Status: CANCELLED
Start: 2023-05-01 | End: 2023-05-31

## 2023-05-01 NOTE — TELEPHONE ENCOUNTER
[employer] sent requesting pt verify availability of adderall at pharmacy.  Rx pended for correction of pharmacy before forwarding to Dr. Ashly Glover for approval.

## 2023-05-03 NOTE — TELEPHONE ENCOUNTER
Rx pended for approval. Pt requests it be sent to [facility] in [place].

## 2023-05-04 RX ORDER — DEXTROAMPHETAMINE SACCHARATE, AMPHETAMINE ASPARTATE MONOHYDRATE, DEXTROAMPHETAMINE SULFATE AND AMPHETAMINE SULFATE 5; 5; 5; 5 MG/1; MG/1; MG/1; MG/1
20 CAPSULE, EXTENDED RELEASE ORAL EVERY MORNING
Qty: 30 CAPSULE | Refills: 0 | Status: SHIPPED | OUTPATIENT
Start: 2023-05-04 | End: 2023-06-03

## 2023-05-15 DIAGNOSIS — R80.9 TYPE 2 DIABETES MELLITUS WITH MICROALBUMINURIA, WITHOUT LONG-TERM CURRENT USE OF INSULIN (HCC): ICD-10-CM

## 2023-05-15 DIAGNOSIS — E11.29 TYPE 2 DIABETES MELLITUS WITH MICROALBUMINURIA, WITHOUT LONG-TERM CURRENT USE OF INSULIN (HCC): ICD-10-CM

## 2023-05-15 RX ORDER — SEMAGLUTIDE 2.68 MG/ML
2 INJECTION, SOLUTION SUBCUTANEOUS WEEKLY
Qty: 9 ML | Refills: 1 | Status: SHIPPED | OUTPATIENT
Start: 2023-05-15

## 2023-05-18 DIAGNOSIS — R80.9 TYPE 2 DIABETES MELLITUS WITH MICROALBUMINURIA, WITHOUT LONG-TERM CURRENT USE OF INSULIN (HCC): Primary | ICD-10-CM

## 2023-05-18 DIAGNOSIS — R80.9 TYPE 2 DIABETES MELLITUS WITH MICROALBUMINURIA, WITHOUT LONG-TERM CURRENT USE OF INSULIN (HCC): ICD-10-CM

## 2023-05-18 DIAGNOSIS — E11.29 TYPE 2 DIABETES MELLITUS WITH MICROALBUMINURIA, WITHOUT LONG-TERM CURRENT USE OF INSULIN (HCC): Primary | ICD-10-CM

## 2023-05-18 DIAGNOSIS — E11.29 TYPE 2 DIABETES MELLITUS WITH MICROALBUMINURIA, WITHOUT LONG-TERM CURRENT USE OF INSULIN (HCC): ICD-10-CM

## 2023-05-18 RX ORDER — BLOOD-GLUCOSE SENSOR
1 EACH MISCELLANEOUS
Qty: 6 EACH | Refills: 3 | Status: SHIPPED | OUTPATIENT
Start: 2023-05-18

## 2023-05-18 RX ORDER — METFORMIN HYDROCHLORIDE 500 MG/1
1000 TABLET, EXTENDED RELEASE ORAL 2 TIMES DAILY WITH MEALS
Qty: 360 TABLET | Refills: 3 | Status: SHIPPED | OUTPATIENT
Start: 2023-05-18

## 2023-06-05 ENCOUNTER — OFFICE VISIT (OUTPATIENT)
Dept: ENDOCRINOLOGY CLINIC | Facility: CLINIC | Age: 43
End: 2023-06-05
Payer: COMMERCIAL

## 2023-06-05 VITALS
WEIGHT: 198.81 LBS | OXYGEN SATURATION: 77 % | RESPIRATION RATE: 17 BRPM | HEART RATE: 98 BPM | DIASTOLIC BLOOD PRESSURE: 60 MMHG | SYSTOLIC BLOOD PRESSURE: 120 MMHG | BODY MASS INDEX: 29 KG/M2

## 2023-06-05 DIAGNOSIS — E78.2 MIXED HYPERLIPIDEMIA: ICD-10-CM

## 2023-06-05 DIAGNOSIS — E11.29 TYPE 2 DIABETES MELLITUS WITH MICROALBUMINURIA, WITHOUT LONG-TERM CURRENT USE OF INSULIN (HCC): Primary | ICD-10-CM

## 2023-06-05 DIAGNOSIS — R80.9 TYPE 2 DIABETES MELLITUS WITH MICROALBUMINURIA, WITHOUT LONG-TERM CURRENT USE OF INSULIN (HCC): Primary | ICD-10-CM

## 2023-06-05 LAB
CARTRIDGE LOT#: 587 NUMERIC
CREAT UR-SCNC: 160 MG/DL
HEMOGLOBIN A1C: 6.6 % (ref 4.3–5.6)
MICROALBUMIN UR-MCNC: 11.2 MG/DL
MICROALBUMIN/CREAT 24H UR-RTO: 70 UG/MG (ref ?–30)

## 2023-06-05 PROCEDURE — 99214 OFFICE O/P EST MOD 30 MIN: CPT | Performed by: NURSE PRACTITIONER

## 2023-06-05 PROCEDURE — 3061F NEG MICROALBUMINURIA REV: CPT | Performed by: NURSE PRACTITIONER

## 2023-06-05 PROCEDURE — 82043 UR ALBUMIN QUANTITATIVE: CPT | Performed by: NURSE PRACTITIONER

## 2023-06-05 PROCEDURE — 83036 HEMOGLOBIN GLYCOSYLATED A1C: CPT | Performed by: NURSE PRACTITIONER

## 2023-06-05 PROCEDURE — 95251 CONT GLUC MNTR ANALYSIS I&R: CPT | Performed by: NURSE PRACTITIONER

## 2023-06-05 PROCEDURE — 3078F DIAST BP <80 MM HG: CPT | Performed by: NURSE PRACTITIONER

## 2023-06-05 PROCEDURE — 82570 ASSAY OF URINE CREATININE: CPT | Performed by: NURSE PRACTITIONER

## 2023-06-05 PROCEDURE — 3060F POS MICROALBUMINURIA REV: CPT | Performed by: NURSE PRACTITIONER

## 2023-06-05 PROCEDURE — 3074F SYST BP LT 130 MM HG: CPT | Performed by: NURSE PRACTITIONER

## 2023-06-05 PROCEDURE — 3044F HG A1C LEVEL LT 7.0%: CPT | Performed by: NURSE PRACTITIONER

## 2023-06-05 RX ORDER — SEMAGLUTIDE 2.68 MG/ML
2 INJECTION, SOLUTION SUBCUTANEOUS WEEKLY
Qty: 9 ML | Refills: 1 | Status: SHIPPED | OUTPATIENT
Start: 2023-06-05

## 2023-06-05 RX ORDER — METFORMIN HYDROCHLORIDE 500 MG/1
1000 TABLET, EXTENDED RELEASE ORAL 2 TIMES DAILY WITH MEALS
Qty: 360 TABLET | Refills: 3 | Status: SHIPPED | OUTPATIENT
Start: 2023-06-05

## 2023-06-05 RX ORDER — DEXTROAMPHETAMINE SACCHARATE, AMPHETAMINE ASPARTATE MONOHYDRATE, DEXTROAMPHETAMINE SULFATE AND AMPHETAMINE SULFATE 5; 5; 5; 5 MG/1; MG/1; MG/1; MG/1
20 CAPSULE, EXTENDED RELEASE ORAL EVERY MORNING
COMMUNITY

## 2023-06-05 RX ORDER — ATORVASTATIN CALCIUM 10 MG/1
10 TABLET, FILM COATED ORAL NIGHTLY
Qty: 90 TABLET | Refills: 3 | Status: SHIPPED | OUTPATIENT
Start: 2023-06-05

## 2023-06-05 RX ORDER — LISINOPRIL 2.5 MG/1
2.5 TABLET ORAL DAILY
Qty: 90 TABLET | Refills: 3 | Status: SHIPPED | OUTPATIENT
Start: 2023-06-05

## 2023-07-10 RX ORDER — DEXTROAMPHETAMINE SACCHARATE, AMPHETAMINE ASPARTATE MONOHYDRATE, DEXTROAMPHETAMINE SULFATE AND AMPHETAMINE SULFATE 5; 5; 5; 5 MG/1; MG/1; MG/1; MG/1
20 CAPSULE, EXTENDED RELEASE ORAL DAILY
Qty: 30 CAPSULE | Refills: 0 | Status: SHIPPED | OUTPATIENT
Start: 2023-07-10 | End: 2023-08-09

## 2023-07-10 RX ORDER — DEXTROAMPHETAMINE SACCHARATE, AMPHETAMINE ASPARTATE MONOHYDRATE, DEXTROAMPHETAMINE SULFATE AND AMPHETAMINE SULFATE 5; 5; 5; 5 MG/1; MG/1; MG/1; MG/1
20 CAPSULE, EXTENDED RELEASE ORAL DAILY
Qty: 30 CAPSULE | Refills: 0 | Status: SHIPPED | OUTPATIENT
Start: 2023-08-10 | End: 2023-09-09

## 2023-07-10 RX ORDER — DEXTROAMPHETAMINE SACCHARATE, AMPHETAMINE ASPARTATE MONOHYDRATE, DEXTROAMPHETAMINE SULFATE AND AMPHETAMINE SULFATE 5; 5; 5; 5 MG/1; MG/1; MG/1; MG/1
20 CAPSULE, EXTENDED RELEASE ORAL EVERY MORNING
Refills: 0 | Status: CANCELLED | OUTPATIENT
Start: 2023-07-10

## 2023-07-10 RX ORDER — DEXTROAMPHETAMINE SACCHARATE, AMPHETAMINE ASPARTATE MONOHYDRATE, DEXTROAMPHETAMINE SULFATE AND AMPHETAMINE SULFATE 5; 5; 5; 5 MG/1; MG/1; MG/1; MG/1
20 CAPSULE, EXTENDED RELEASE ORAL DAILY
Qty: 30 CAPSULE | Refills: 0 | Status: SHIPPED | OUTPATIENT
Start: 2023-09-10 | End: 2023-10-10

## 2023-08-30 ENCOUNTER — LAB ENCOUNTER (OUTPATIENT)
Dept: LAB | Age: 43
End: 2023-08-30
Attending: NURSE PRACTITIONER
Payer: COMMERCIAL

## 2023-08-30 DIAGNOSIS — E78.2 MIXED HYPERLIPIDEMIA: ICD-10-CM

## 2023-08-30 DIAGNOSIS — R80.9 TYPE 2 DIABETES MELLITUS WITH MICROALBUMINURIA, WITHOUT LONG-TERM CURRENT USE OF INSULIN: ICD-10-CM

## 2023-08-30 DIAGNOSIS — E11.29 TYPE 2 DIABETES MELLITUS WITH MICROALBUMINURIA, WITHOUT LONG-TERM CURRENT USE OF INSULIN: ICD-10-CM

## 2023-08-30 LAB
ALBUMIN SERPL-MCNC: 3.7 G/DL (ref 3.4–5)
ALBUMIN/GLOB SERPL: 0.9 {RATIO} (ref 1–2)
ALP LIVER SERPL-CCNC: 72 U/L
ALT SERPL-CCNC: 69 U/L
ANION GAP SERPL CALC-SCNC: 5 MMOL/L (ref 0–18)
AST SERPL-CCNC: 20 U/L (ref 15–37)
BILIRUB SERPL-MCNC: 0.4 MG/DL (ref 0.1–2)
BUN BLD-MCNC: 14 MG/DL (ref 7–18)
CALCIUM BLD-MCNC: 9 MG/DL (ref 8.5–10.1)
CHLORIDE SERPL-SCNC: 105 MMOL/L (ref 98–112)
CHOLEST SERPL-MCNC: 102 MG/DL (ref ?–200)
CO2 SERPL-SCNC: 27 MMOL/L (ref 21–32)
CREAT BLD-MCNC: 0.82 MG/DL
EGFRCR SERPLBLD CKD-EPI 2021: 113 ML/MIN/1.73M2 (ref 60–?)
EST. AVERAGE GLUCOSE BLD GHB EST-MCNC: 160 MG/DL (ref 68–126)
FASTING PATIENT LIPID ANSWER: YES
FASTING STATUS PATIENT QL REPORTED: YES
GLOBULIN PLAS-MCNC: 4 G/DL (ref 2.8–4.4)
GLUCOSE BLD-MCNC: 149 MG/DL (ref 70–99)
HBA1C MFR BLD: 7.2 % (ref ?–5.7)
HDLC SERPL-MCNC: 32 MG/DL (ref 40–59)
LDLC SERPL CALC-MCNC: 48 MG/DL (ref ?–100)
NONHDLC SERPL-MCNC: 70 MG/DL (ref ?–130)
OSMOLALITY SERPL CALC.SUM OF ELEC: 287 MOSM/KG (ref 275–295)
POTASSIUM SERPL-SCNC: 3.9 MMOL/L (ref 3.5–5.1)
PROT SERPL-MCNC: 7.7 G/DL (ref 6.4–8.2)
SODIUM SERPL-SCNC: 137 MMOL/L (ref 136–145)
TRIGL SERPL-MCNC: 118 MG/DL (ref 30–149)
VLDLC SERPL CALC-MCNC: 17 MG/DL (ref 0–30)

## 2023-08-30 PROCEDURE — 80053 COMPREHEN METABOLIC PANEL: CPT

## 2023-08-30 PROCEDURE — 80061 LIPID PANEL: CPT

## 2023-08-30 PROCEDURE — 36415 COLL VENOUS BLD VENIPUNCTURE: CPT

## 2023-08-30 PROCEDURE — 3051F HG A1C>EQUAL 7.0%<8.0%: CPT | Performed by: NURSE PRACTITIONER

## 2023-08-30 PROCEDURE — 83036 HEMOGLOBIN GLYCOSYLATED A1C: CPT

## 2023-09-07 ENCOUNTER — OFFICE VISIT (OUTPATIENT)
Dept: FAMILY MEDICINE CLINIC | Facility: CLINIC | Age: 43
End: 2023-09-07
Payer: COMMERCIAL

## 2023-09-07 VITALS
WEIGHT: 200 LBS | SYSTOLIC BLOOD PRESSURE: 110 MMHG | OXYGEN SATURATION: 99 % | RESPIRATION RATE: 16 BRPM | DIASTOLIC BLOOD PRESSURE: 74 MMHG | TEMPERATURE: 99 F | HEIGHT: 70 IN | BODY MASS INDEX: 28.63 KG/M2 | HEART RATE: 120 BPM

## 2023-09-07 DIAGNOSIS — R00.0 TACHYCARDIA: ICD-10-CM

## 2023-09-07 DIAGNOSIS — R05.1 ACUTE COUGH: ICD-10-CM

## 2023-09-07 DIAGNOSIS — J30.2 SEASONAL ALLERGIES: Primary | ICD-10-CM

## 2023-09-07 PROCEDURE — 3078F DIAST BP <80 MM HG: CPT | Performed by: NURSE PRACTITIONER

## 2023-09-07 PROCEDURE — 99213 OFFICE O/P EST LOW 20 MIN: CPT | Performed by: NURSE PRACTITIONER

## 2023-09-07 PROCEDURE — 3008F BODY MASS INDEX DOCD: CPT | Performed by: NURSE PRACTITIONER

## 2023-09-07 PROCEDURE — 3074F SYST BP LT 130 MM HG: CPT | Performed by: NURSE PRACTITIONER

## 2023-09-07 RX ORDER — BENZONATATE 200 MG/1
200 CAPSULE ORAL 3 TIMES DAILY PRN
Qty: 30 CAPSULE | Refills: 0 | Status: SHIPPED | OUTPATIENT
Start: 2023-09-07

## 2023-09-07 RX ORDER — AZELASTINE HYDROCHLORIDE, FLUTICASONE PROPIONATE 137; 50 UG/1; UG/1
SPRAY, METERED NASAL
COMMUNITY
Start: 2023-09-06

## 2023-09-07 RX ORDER — MONTELUKAST SODIUM 10 MG/1
TABLET ORAL
COMMUNITY
Start: 2023-09-06

## 2023-09-07 NOTE — PATIENT INSTRUCTIONS
Your heart rate was elevated today. As discussed in clinic, I am unable to rule out other potentially more serious causes of elevated heart rate in the walk in clinic. Follow up closely with your doctor this week. Go to the ER for new or worsening symptoms or persistently elevated heart rate.

## 2023-09-12 ENCOUNTER — TELEMEDICINE (OUTPATIENT)
Dept: FAMILY MEDICINE CLINIC | Facility: CLINIC | Age: 43
End: 2023-09-12
Payer: COMMERCIAL

## 2023-09-12 DIAGNOSIS — R00.0 SINUS TACHYCARDIA: ICD-10-CM

## 2023-09-12 DIAGNOSIS — E11.29 TYPE 2 DIABETES MELLITUS WITH MICROALBUMINURIA, WITHOUT LONG-TERM CURRENT USE OF INSULIN: ICD-10-CM

## 2023-09-12 DIAGNOSIS — R80.9 TYPE 2 DIABETES MELLITUS WITH MICROALBUMINURIA, WITHOUT LONG-TERM CURRENT USE OF INSULIN: ICD-10-CM

## 2023-09-12 DIAGNOSIS — J01.01 ACUTE RECURRENT MAXILLARY SINUSITIS: Primary | ICD-10-CM

## 2023-09-12 PROCEDURE — 99214 OFFICE O/P EST MOD 30 MIN: CPT | Performed by: FAMILY MEDICINE

## 2023-09-12 RX ORDER — AMOXICILLIN AND CLAVULANATE POTASSIUM 875; 125 MG/1; MG/1
1 TABLET, FILM COATED ORAL 2 TIMES DAILY
Qty: 14 TABLET | Refills: 0 | Status: SHIPPED | OUTPATIENT
Start: 2023-09-12 | End: 2023-09-19

## 2023-09-12 RX ORDER — CODEINE PHOSPHATE AND GUAIFENESIN 10; 100 MG/5ML; MG/5ML
5 SOLUTION ORAL EVERY 6 HOURS PRN
Qty: 120 ML | Refills: 0 | Status: SHIPPED | OUTPATIENT
Start: 2023-09-12 | End: 2023-09-17

## 2023-09-21 ENCOUNTER — PATIENT MESSAGE (OUTPATIENT)
Dept: ENDOCRINOLOGY CLINIC | Facility: CLINIC | Age: 43
End: 2023-09-21

## 2023-09-22 NOTE — TELEPHONE ENCOUNTER
Meds confirmed, [place] report to provider. Changes needed? Hyperglycemia triage:   Confirm current Diabetes medications with patient (not from chart note)   Onset, frequency and duration of symptoms? Recent illness or steroid prescription/injection? Obtain Blood sugar readings: BG log or CGM? Changes in diet? Changes in any medications?

## 2023-09-22 NOTE — TELEPHONE ENCOUNTER
Contacted patient to discuss concerns with elevated glucose due to sinusitis. Angela report reviewed, glucose slightly elevated but stable. Do not recommend changes to diabetes medication regimen at this time. Patient to stay hydrated, continue Singulair for symptoms and eat healthy carbohydrates with protein to keep glucose level. Patient verbalizes understanding and has no further questions at this time. Will follow up as scheduled on 10/13.     Rajesh Navarrete APRN, BC-ADM, [facility]

## 2023-10-13 ENCOUNTER — TELEMEDICINE (OUTPATIENT)
Dept: ENDOCRINOLOGY CLINIC | Facility: CLINIC | Age: 43
End: 2023-10-13
Payer: COMMERCIAL

## 2023-10-13 DIAGNOSIS — E11.29 TYPE 2 DIABETES MELLITUS WITH MICROALBUMINURIA, WITHOUT LONG-TERM CURRENT USE OF INSULIN: Primary | ICD-10-CM

## 2023-10-13 DIAGNOSIS — R80.9 TYPE 2 DIABETES MELLITUS WITH MICROALBUMINURIA, WITHOUT LONG-TERM CURRENT USE OF INSULIN: Primary | ICD-10-CM

## 2023-10-13 DIAGNOSIS — R80.9 MICROALBUMINURIA: ICD-10-CM

## 2023-10-13 PROCEDURE — 95251 CONT GLUC MNTR ANALYSIS I&R: CPT | Performed by: NURSE PRACTITIONER

## 2023-10-13 PROCEDURE — 99213 OFFICE O/P EST LOW 20 MIN: CPT | Performed by: NURSE PRACTITIONER

## 2023-10-13 NOTE — PROGRESS NOTES
HPI:   Juliana Ramos is a 41year old male who presents virtually for the management of his diabetes. This visit is conducted using Telemedicine with live, two way, interactive video and audio. Patient verbally consents to Telemedicine visit. Patient understands and accepts financial responsibility for any deductible, co-insurance and/or co-pays associated with this service. Chief Complaint: Diabetes Follow Up    Diabetes: needs improvement  Type: 2   Duration:approximately 11 years  Current Meds: Metformin ER 1000mg po bid, Ozempic 2mg injection weekly    SE: denies  Long term insulin use: no  Failed Meds: Farxiga - yeast infection, Metformin IR - diarrhea    Complications: Proteinuria    Personal Freestyle Angela CGM  Analysis of data: 9/29/2023 - 10/12/2023  % Time CGM is Active: 75%  Sensor download: full report  in media  Average glucose : 200 mg/dl     CV (coefficient of variation) : 17.9%     59% time above 180mg /dl   11% time above 250 mg/dl  30% time in target range:  mg/dl   0% time below target range: 70mg/dl     Evaluation   1. Baseline glucose stable but elevated  2. Occasional postprandial elevations  3. Low likelihood of hypoglycemia  4. Normal glucose variability         Overall glucose control:   HGBA1C:    Lab Results   Component Value Date    A1C 7.2 (H) 08/30/2023    A1C 6.6 (A) 06/05/2023    A1C 6.7 (A) 02/27/2023     (H) 08/30/2023          Wt Readings from Last 3 Encounters:  09/07/23 : 200 lb (90.7 kg)  06/05/23 : 198 lb 12.8 oz (90.2 kg)  03/21/23 : 200 lb (90.7 kg)          Past History:   He  has a past medical history of Bell's palsy, Bell's palsy, Blood pressure elevated without history of HTN, COVID-19 (07/15/2022), COVID-19 (10/15/2022), Essential hypertension, Other and unspecified hyperlipidemia, Seasonal allergies, and Type II or unspecified type diabetes mellitus without mention of complication, not stated as uncontrolled.    His family history includes Diabetes in his maternal grandmother; Fibromyalgia in his mother; Heart Attack in his paternal grandmother; Heart Disease in his father; diabetes mellitus in his father and mother; heart failure in his father; respitory failure in his maternal grandfather. He  reports that he has never smoked. He has never used smokeless tobacco. He reports current alcohol use. He reports that he does not use drugs. He is allergic to allergy, grass, mold, pollen, and seasonal.   Azelastine-Fluticasone 137-50 MCG/ACT Nasal Suspension, ONE SPRAY IN EACH NOSTRIL EVERY 12 HOURS  montelukast 10 MG Oral Tab,   Amphetamine-Dextroamphet ER 20 MG Oral Capsule SR 24 Hr, Take 1 capsule (20 mg total) by mouth every morning. lisinopril 2.5 MG Oral Tab, Take 1 tablet (2.5 mg total) by mouth daily. metFORMIN  MG Oral Tablet 24 Hr, Take 2 tablets (1,000 mg total) by mouth 2 (two) times daily with meals. atorvastatin 10 MG Oral Tab, Take 1 tablet (10 mg total) by mouth nightly. semaglutide (OZEMPIC, 2 MG/DOSE,) 8 MG/3ML Subcutaneous Solution Pen-injector, Inject 2 mg into the skin once a week. FREESTYLE DANILO 3 SENSOR Does not apply Misc, 1 each every 14 (fourteen) days. No current facility-administered medications on file prior to visit.       CMP  (most recent labs)   Lab Results   Component Value Date/Time     (H) 08/30/2023 07:31 AM    BUN 14 08/30/2023 07:31 AM    CREATSERUM 0.82 08/30/2023 07:31 AM    GFRNAA 117 06/16/2022 12:00 AM    GFRAA 147 07/15/2021 07:37 AM    EGFRCR 113 08/30/2023 07:31 AM    CA 9.0 08/30/2023 07:31 AM    ALKPHO 72 08/30/2023 07:31 AM    AST 20 08/30/2023 07:31 AM    ALT 69 (H) 08/30/2023 07:31 AM    BILT 0.4 08/30/2023 07:31 AM    TP 7.7 08/30/2023 07:31 AM    ALB 3.7 08/30/2023 07:31 AM     08/30/2023 07:31 AM    K 3.9 08/30/2023 07:31 AM     08/30/2023 07:31 AM    CO2 27.0 08/30/2023 07:31 AM           Lipids  (most recent labs)   Lab Results   Component Value Date/Time CHOLEST 102 08/30/2023 07:31 AM    TRIG 118 08/30/2023 07:31 AM    HDL 32 (L) 08/30/2023 07:31 AM    LDL 48 08/30/2023 07:31 AM    NONHDLC 70 08/30/2023 07:31 AM          Diabetes  (most recent labs)   Lab Results   Component Value Date/Time    A1C 7.2 (H) 08/30/2023 07:31 AM          Microalb (most recent labs)   Lab Results   Component Value Date/Time    MICROALBUMIN 4.1 01/22/2021 09:16 AM    MICROALBCREA 70.0 (H) 06/05/2023 09:18 AM        Lab results reviewed with patient. REVIEW OF SYSTEMS:   GENERAL HEALTH: feels well otherwise  SKIN: denies any unusual skin lesions or rashes  RESPIRATORY: denies shortness of breath with exertion  CARDIOVASCULAR: denies chest pain on exertion  GI: denies abdominal pain and denies heartburn  NEURO: denies headaches     EXAM:   Physical Exam  Pulmonary:      Comments: Able to speak in complete sentences without difficulty  Neurological:      Mental Status: He is alert and oriented to person, place, and time. Psychiatric:         Mood and Affect: Mood normal.         Behavior: Behavior normal.         ASSESSMENT AND PLAN:   Diabetes:  Patient to continue Metformin ER 1000mg po bid and Ozempic 2mg injection weekly. Start Jardiance 10mg po daily  Discussed the risk of and benefits of SGLT2 inhibitor class for treatment of type 2 Diabetes. This class of meds results in the renal excretion of glucose and can lower blood sugars. eGfr: [address] rx was sent to pharmacy  Patient was instructed to drink at least 6 eight ounce glasses of water daily to avoid dehydration. Patient to continue to use personal Freestyle Angela CGM for glucose monitoring. Discussed self monitoring blood glucose and the importance of monitoring. Patient agreed to monitoring bid - fasting and 2 hours postprandial of one meal per day if not using CGM. Reinforced healthy eating in healthy portions and increasing daily physical activity.   Reviewed ways to improve the protein in the urine:   Keep blood sugars in target range   Keep blood pressure in target range   Watch over the counter medicines like NSAID (non steroidal anti-inflammatory drugs) these can be harmful to  kidneys (examples include: , Motrin , Advil, ibuprofen, naprosyn, Aleve)   Continue ACEI therapy - renoprotective    DM Health Maintenance  Last dilated eye exam: No data recorded Exam shows retinopathy? No data recorded  Last diabetic foot exam: Last Foot Exam: 23    Date of last PHQ-2 depression screen: PHQ-2 - Date of last depression screenin2023    Patient  reports that he has never smoked. He has never used smokeless tobacco.  When is flu vaccine due? Influenza Vaccine(1) due on 10/01/2023  When is pneumonia vaccine due? Pneumococcal Vaccination(1 - PCV) Never done    The patient indicates understanding of these issues and agrees to the plan. Refills addressed at time of office visit. Diagnoses and all orders for this visit:    Type 2 diabetes mellitus with microalbuminuria, without long-term current use of insulin   -     empagliflozin (JARDIANCE) 10 MG Oral Tab; Take 1 tablet (10 mg total) by mouth daily. Microalbuminuria       Return in about 5 weeks (around 2023) for Personal CGM, 45 minute appointment, Virtual Visit. The risks and benefits of my recommendations, as well as other treatment options were discussed with the patient today. questions were answered to the best of my knowledge. Patient verbalizes understanding and amendable to plan of care.   Duration of this service:  12 minutes   Sarah HERBERT, BC-ADM, [facility]

## 2023-10-27 ENCOUNTER — PATIENT MESSAGE (OUTPATIENT)
Dept: FAMILY MEDICINE CLINIC | Facility: CLINIC | Age: 43
End: 2023-10-27

## 2023-10-27 NOTE — TELEPHONE ENCOUNTER
Pt is requesting job accommodations r/t illness and his DM. He states he has had respiratory illnesses so is requesting additional time off work when he does get sick-2 days at home to rest and avoid public. As for DM he is requesting accommodations for this but has not been specific as to what he is requesting. Could you please advise if this is something medically appropriate? OV? Thanks.

## 2023-10-27 NOTE — TELEPHONE ENCOUNTER
From: Van Ratliff  To: Haim Freire  Sent: 10/27/2023 11:15 AM CDT  Subject: Job accommodations     Good morning! Geofm Lexi been getting sick a lot recently I have even taken a Covid shot/booster and flu shot to try to get ahead of illness. Because of this, Soni missed some days of work. My job is asking for an accommodations request and not sure how I would go about this? I have documentation from them what is needed from me on your end? Thanks for any guidance.

## 2023-11-02 ENCOUNTER — PATIENT MESSAGE (OUTPATIENT)
Dept: ENDOCRINOLOGY CLINIC | Facility: CLINIC | Age: 43
End: 2023-11-02

## 2023-11-02 NOTE — TELEPHONE ENCOUNTER
From: Jaime Akhtar  Sent: 11/2/2023 1:29 PM CDT  To: [facility]  Subject: Job accommodations request    This is the form that would need to be filled out. Soni had my flu shot and new Covid booster. I tend to get sick easily and I just need time to recover so I ask to work from home instead of the office. I appreciate the assistance, it is due today but I have asked for an extension. I originally went through Dr Frida Tam and he stated to go through [employer].

## 2023-11-06 NOTE — TELEPHONE ENCOUNTER
Provider completed form for patient and nurse visit scheduled for  tomorrow morning.     Ese HERBERT, BC-ADM, [facility]

## 2023-11-07 ENCOUNTER — NURSE ONLY (OUTPATIENT)
Dept: ENDOCRINOLOGY CLINIC | Facility: CLINIC | Age: 43
End: 2023-11-07

## 2023-11-17 ENCOUNTER — TELEMEDICINE (OUTPATIENT)
Dept: ENDOCRINOLOGY CLINIC | Facility: CLINIC | Age: 43
End: 2023-11-17
Payer: COMMERCIAL

## 2023-11-17 DIAGNOSIS — E11.29 TYPE 2 DIABETES MELLITUS WITH MICROALBUMINURIA, WITHOUT LONG-TERM CURRENT USE OF INSULIN: Primary | ICD-10-CM

## 2023-11-17 DIAGNOSIS — R80.9 TYPE 2 DIABETES MELLITUS WITH MICROALBUMINURIA, WITHOUT LONG-TERM CURRENT USE OF INSULIN: Primary | ICD-10-CM

## 2023-11-17 DIAGNOSIS — E78.2 MIXED HYPERLIPIDEMIA: ICD-10-CM

## 2023-11-17 PROCEDURE — 95251 CONT GLUC MNTR ANALYSIS I&R: CPT | Performed by: NURSE PRACTITIONER

## 2023-11-17 PROCEDURE — 99213 OFFICE O/P EST LOW 20 MIN: CPT | Performed by: NURSE PRACTITIONER

## 2023-11-17 NOTE — PROGRESS NOTES
HPI:   Hattie Valente is a 41year old male who presents virtually for the management of his diabetes. This visit is conducted using Telemedicine with live, two way, interactive video and audio. Patient verbally consents to Telemedicine visit. Patient understands and accepts financial responsibility for any deductible, co-insurance and/or co-pays associated with this service. Chief Complaint: Diabetes Follow Up    Diabetes: improving  Type: 2   Duration:approximately 11 years  Current Meds: Metformin ER 1000mg po bid, Ozempic 2mg injection weekly, Jardiance 10mg po daily    SE: denies  Long term insulin use: no  Failed Meds: Farxiga - yeast infection, Metformin IR - diarrhea    Complications: Proteinuria    Personal Freestyle Angela CGM  Analysis of data: 11/3/2023 - 11/16/2023  % Time CGM is Active: 90%  Sensor download: full report  in media  Average glucose : 142 mg/dl     CV (coefficient of variation) : 15.9%     6% time above 180mg /dl   0% time above 250 mg/dl  94% time in target range:  mg/dl   0% time below target range: 70mg/dl     Evaluation   1. Baseline glucose stable and in target range  2. Minimal postprandial elevations  3. Low likelihood of hypoglycemia  4. Normal glucose variability         Overall glucose control:   HGBA1C:    Lab Results   Component Value Date    A1C 7.2 (H) 08/30/2023    A1C 6.6 (A) 06/05/2023    A1C 6.7 (A) 02/27/2023     (H) 08/30/2023          Wt Readings from Last 3 Encounters:   09/07/23 200 lb (90.7 kg)   06/05/23 198 lb 12.8 oz (90.2 kg)   03/21/23 200 lb (90.7 kg)           Past History:   He  has a past medical history of Bell's palsy, Bell's palsy, Blood pressure elevated without history of HTN, COVID-19 (07/15/2022), COVID-19 (10/15/2022), Essential hypertension, Other and unspecified hyperlipidemia, Seasonal allergies, and Type II or unspecified type diabetes mellitus without mention of complication, not stated as uncontrolled.    His family history includes Diabetes in his maternal grandmother; Fibromyalgia in his mother; Heart Attack in his paternal grandmother; Heart Disease in his father; diabetes mellitus in his father and mother; heart failure in his father; respitory failure in his maternal grandfather. He  reports that he has never smoked. He has never used smokeless tobacco. He reports current alcohol use. He reports that he does not use drugs. He is allergic to allergy, grass, mold, pollen, and seasonal.     Current Outpatient Medications on File Prior to Visit   Medication Sig    [DISCONTINUED] empagliflozin (JARDIANCE) 10 MG Oral Tab Take 1 tablet (10 mg total) by mouth daily. Azelastine-Fluticasone 137-50 MCG/ACT Nasal Suspension ONE SPRAY IN EACH NOSTRIL EVERY 12 HOURS    montelukast 10 MG Oral Tab     Amphetamine-Dextroamphet ER 20 MG Oral Capsule SR 24 Hr Take 1 capsule (20 mg total) by mouth every morning. lisinopril 2.5 MG Oral Tab Take 1 tablet (2.5 mg total) by mouth daily. metFORMIN  MG Oral Tablet 24 Hr Take 2 tablets (1,000 mg total) by mouth 2 (two) times daily with meals. atorvastatin 10 MG Oral Tab Take 1 tablet (10 mg total) by mouth nightly. semaglutide (OZEMPIC, 2 MG/DOSE,) 8 MG/3ML Subcutaneous Solution Pen-injector Inject 2 mg into the skin once a week. FREESTYLE DANILO 3 SENSOR Does not apply Misc 1 each every 14 (fourteen) days. No current facility-administered medications on file prior to visit.        CMP  (most recent labs)   Lab Results   Component Value Date/Time     (H) 08/30/2023 07:31 AM    BUN 14 08/30/2023 07:31 AM    CREATSERUM 0.82 08/30/2023 07:31 AM    GFRNAA 117 06/16/2022 12:00 AM    GFRAA 147 07/15/2021 07:37 AM    EGFRCR 113 08/30/2023 07:31 AM    CA 9.0 08/30/2023 07:31 AM    ALKPHO 72 08/30/2023 07:31 AM    AST 20 08/30/2023 07:31 AM    ALT 69 (H) 08/30/2023 07:31 AM    BILT 0.4 08/30/2023 07:31 AM    TP 7.7 08/30/2023 07:31 AM    ALB 3.7 08/30/2023 07:31 AM  08/30/2023 07:31 AM    K 3.9 08/30/2023 07:31 AM     08/30/2023 07:31 AM    CO2 27.0 08/30/2023 07:31 AM           Lipids  (most recent labs)   Lab Results   Component Value Date/Time    CHOLEST 102 08/30/2023 07:31 AM    TRIG 118 08/30/2023 07:31 AM    HDL 32 (L) 08/30/2023 07:31 AM    LDL 48 08/30/2023 07:31 AM    NONHDLC 70 08/30/2023 07:31 AM          Diabetes  (most recent labs)   Lab Results   Component Value Date/Time    A1C 7.2 (H) 08/30/2023 07:31 AM          Microalb (most recent labs)   Lab Results   Component Value Date/Time    MICROALBUMIN 4.1 01/22/2021 09:16 AM    MICROALBCREA 70.0 (H) 06/05/2023 09:18 AM        Lab results reviewed with patient. REVIEW OF SYSTEMS:   GENERAL HEALTH: feels well otherwise  SKIN: denies any unusual skin lesions or rashes  RESPIRATORY: denies shortness of breath with exertion  CARDIOVASCULAR: denies chest pain on exertion  GI: denies abdominal pain and denies heartburn  NEURO: denies headaches     EXAM:   Physical Exam  Pulmonary:      Comments: Able to speak in complete sentences without difficulty  Neurological:      Mental Status: He is alert and oriented to person, place, and time. Psychiatric:         Mood and Affect: Mood normal.         Behavior: Behavior normal.         ASSESSMENT AND PLAN:   Diabetes:  Patient to continue Metformin ER 1000mg po bid, Ozempic 2mg injection weekly, and Jardiance 10mg po daily. Per ADA guidelines, in patients with type 2 diabetes and established ASCVD, incorporating agent proven to reduce major adverse CV events and CV mortality   GLP-1 Ozempic rx chosen since it not only lowers A1C, but studies have shown it can also reduce the risk of major CV events such as heart attack, stroke, or death in adults with type 2 diabetes who are currently treating their CV disease.   Per FDA indications, Tharon [place] Paz is shown to reduce the risk of cardiovascular death in adults with type 2 diabetes and established cardiovascular 60 disease, and to reduce the risk of death and hospitalization in patients with heart failure and low ejection fraction when added to standard of care therapies for diabetes and patients with known atherosclerotic cardiovascular disease. Patient to continue to use personal Freestyle Angela CGM for glucose monitoring. Discussed self monitoring blood glucose and the importance of monitoring. Patient agreed to monitoring bid - fasting and 2 hours postprandial of one meal per day if not using CGM. Reinforced healthy eating in healthy portions and increasing daily physical activity. Reviewed ways to improve the protein in the urine:   Keep blood sugars in target range   Keep blood pressure in target range   Watch over the counter medicines like NSAID (non steroidal anti-inflammatory drugs) these can be harmful to  kidneys (examples include: , Motrin , Advil, ibuprofen, naprosyn, Aleve)   Continue ACEI & SGLT2 therapy - renoprotective    DM Health Maintenance  Last dilated eye exam: No data recorded Exam shows retinopathy? No data recorded  Last diabetic foot exam: Last Foot Exam: 23    Date of last PHQ-2 depression screen: PHQ-2 - Date of last depression screenin2023    Patient  reports that he has never smoked. He has never used smokeless tobacco.  When is flu vaccine due? Influenza Vaccine(1) due on 10/01/2023  When is pneumonia vaccine due? Pneumococcal Vaccination(1 - PCV) Never done    The patient indicates understanding of these issues and agrees to the plan. Refills addressed at time of office visit. Diagnoses and all orders for this visit:    Type 2 diabetes mellitus with microalbuminuria, without long-term current use of insulin   -     empagliflozin (JARDIANCE) 10 MG Oral Tab;  Take 1 tablet (10 mg total) by mouth daily.  -     COMP METABOLIC PANEL [56231] [Q]  -     HGB A1C [496] [Q]  -     MICROALB/CREAT RATIO, RANDOM URINE [4217] [Q]    Mixed hyperlipidemia  -     LIPID PANEL [4060] [Q]         Return in about 3 months (around 2/17/2024) for 45 minute appointment, Personal CGM. The risks and benefits of my recommendations, as well as other treatment options were discussed with the patient today. questions were answered to the best of my knowledge. Patient verbalizes understanding and amendable to plan of care.   Duration of this service:  12 minutes   Esau HERBERT, BC-ADM, [facility]

## 2023-12-11 ENCOUNTER — PATIENT MESSAGE (OUTPATIENT)
Dept: ENDOCRINOLOGY CLINIC | Facility: CLINIC | Age: 43
End: 2023-12-11

## 2023-12-11 NOTE — TELEPHONE ENCOUNTER
From: Maritza Smalls  To: Jearld Phoenix  Sent: 12/11/2023 6:58 AM CST  Subject: Jardiance and work accommodation     Good morning. I think I spoke too soon about Jardiance not affecting me as this weekend I started having problems with it. Also my job would like me to still complete the accommodation form just to allow the work from home capability when I have side effects due to medication. Is it possible to send the form again with the time frame just mentioning 6 months to re evaluate?

## 2023-12-12 NOTE — TELEPHONE ENCOUNTER
Spoke with patient, instructed to stop Jardiance. Will continue Metformin and Ozempic at this time. After the first of the year will change patient to [facility] which will be preferred by insurance. Patient will send work form via [address] for completion.     Diogo Hillole KEON, BC-ADM, [facility]

## 2023-12-14 ENCOUNTER — NURSE ONLY (OUTPATIENT)
Dept: ENDOCRINOLOGY CLINIC | Facility: CLINIC | Age: 43
End: 2023-12-14

## 2023-12-14 NOTE — TELEPHONE ENCOUNTER
Form completed and signed by provider. Please contact patient to  from [place] office location and make copy for file and send to scan.     Iveth Napoles APRN, BC-ADM, [facility]

## 2023-12-14 NOTE — TELEPHONE ENCOUNTER
Called pt - his wife will try to stop by before 4 today.  Sent copy to scan and copy in [employer] folder

## 2023-12-15 ENCOUNTER — PATIENT MESSAGE (OUTPATIENT)
Dept: ENDOCRINOLOGY CLINIC | Facility: CLINIC | Age: 43
End: 2023-12-15

## 2023-12-15 RX ORDER — TIRZEPATIDE 10 MG/.5ML
10 INJECTION, SOLUTION SUBCUTANEOUS WEEKLY
Qty: 2 ML | Refills: 0 | Status: SHIPPED | OUTPATIENT
Start: 2023-12-15

## 2023-12-15 NOTE — TELEPHONE ENCOUNTER
From: Latonia Cantor  To: Vishnu Sportsman  Sent: 12/15/2023 1:34 PM CST  Subject: Replacing Ozempic    Would it be possible to go ahead and change me from ozempic to the new medication?  I thought about it and felt it may be a wiser decision due to insurance cost.

## 2024-01-08 RX ORDER — TIRZEPATIDE 12.5 MG/.5ML
12.5 INJECTION, SOLUTION SUBCUTANEOUS WEEKLY
Qty: 2 ML | Refills: 1 | Status: SHIPPED | OUTPATIENT
Start: 2024-01-08

## 2024-01-22 RX ORDER — DEXTROAMPHETAMINE SACCHARATE, AMPHETAMINE ASPARTATE MONOHYDRATE, DEXTROAMPHETAMINE SULFATE AND AMPHETAMINE SULFATE 5; 5; 5; 5 MG/1; MG/1; MG/1; MG/1
20 CAPSULE, EXTENDED RELEASE ORAL EVERY MORNING
Qty: 30 CAPSULE | Refills: 0 | OUTPATIENT
Start: 2024-01-22

## 2024-01-30 ENCOUNTER — PATIENT MESSAGE (OUTPATIENT)
Dept: ENDOCRINOLOGY CLINIC | Facility: CLINIC | Age: 44
End: 2024-01-30

## 2024-02-07 ENCOUNTER — PATIENT MESSAGE (OUTPATIENT)
Dept: FAMILY MEDICINE CLINIC | Facility: CLINIC | Age: 44
End: 2024-02-07

## 2024-02-07 LAB
ALBUMIN/GLOBULIN RATIO: 1.4 (CALC) (ref 1–2.5)
ALBUMIN: 4 G/DL (ref 3.6–5.1)
ALKALINE PHOSPHATASE: 67 U/L (ref 36–130)
ALT: 29 U/L (ref 9–46)
AST: 13 U/L (ref 10–40)
BILIRUBIN, TOTAL: 0.3 MG/DL (ref 0.2–1.2)
BUN: 16 MG/DL (ref 7–25)
CALCIUM: 9.1 MG/DL (ref 8.6–10.3)
CARBON DIOXIDE: 30 MMOL/L (ref 20–32)
CHLORIDE: 102 MMOL/L (ref 98–110)
CHOL/HDLC RATIO: 3.3 (CALC)
CHOLESTEROL, TOTAL: 107 MG/DL
CREATININE, RANDOM URINE: 37 MG/DL (ref 20–320)
CREATININE: 0.63 MG/DL (ref 0.6–1.29)
EGFR: 123 ML/MIN/1.73M2
GLOBULIN: 2.8 G/DL (CALC) (ref 1.9–3.7)
GLUCOSE: 141 MG/DL (ref 65–99)
HDL CHOLESTEROL: 32 MG/DL
HEMOGLOBIN A1C: 6.4 % OF TOTAL HGB
LDL-CHOLESTEROL: 55 MG/DL (CALC)
MICROALBUMIN/CREATININE RATIO, RANDOM URINE: 41 MCG/MG CREAT
MICROALBUMIN: 1.5 MG/DL
NON-HDL CHOLESTEROL: 75 MG/DL (CALC)
POTASSIUM: 4.5 MMOL/L (ref 3.5–5.3)
PROTEIN, TOTAL: 6.8 G/DL (ref 6.1–8.1)
SODIUM: 138 MMOL/L (ref 135–146)
TRIGLYCERIDES: 121 MG/DL

## 2024-02-07 NOTE — TELEPHONE ENCOUNTER
9/12/23 last VV  Needs appt for this med refill  last Ov 3/2023 for this med refill.  Mychart to pt

## 2024-02-07 NOTE — TELEPHONE ENCOUNTER
From: Delroy Lanier  To: Rajesh Saleh  Sent: 2/7/2024 7:57 AM CST  Subject: Refill request    Good Morning! I have been requesting a refill for a while now with no response. Is there an appointment I need to make? Please let me know what’s needed from me to complete this request.

## 2024-02-08 ENCOUNTER — TELEPHONE (OUTPATIENT)
Dept: ENDOCRINOLOGY CLINIC | Facility: CLINIC | Age: 44
End: 2024-02-08

## 2024-02-08 NOTE — TELEPHONE ENCOUNTER
Received diabetic eye exam for pt exam date 2/6/2024 NO DR abstracted into epic email scanned to [place] /GLENN

## 2024-02-13 ENCOUNTER — TELEPHONE (OUTPATIENT)
Dept: ENDOCRINOLOGY CLINIC | Facility: CLINIC | Age: 44
End: 2024-02-13

## 2024-02-13 NOTE — TELEPHONE ENCOUNTER
Received PA for mounjaro 12.5 mg dose.  Completed and faxed with chart note to [phone],confirmed.

## 2024-02-15 ENCOUNTER — PATIENT MESSAGE (OUTPATIENT)
Dept: ENDOCRINOLOGY CLINIC | Facility: CLINIC | Age: 44
End: 2024-02-15

## 2024-02-15 DIAGNOSIS — E11.29 TYPE 2 DIABETES MELLITUS WITH MICROALBUMINURIA, WITHOUT LONG-TERM CURRENT USE OF INSULIN  (HCC): Primary | ICD-10-CM

## 2024-02-15 DIAGNOSIS — R80.9 TYPE 2 DIABETES MELLITUS WITH MICROALBUMINURIA, WITHOUT LONG-TERM CURRENT USE OF INSULIN  (HCC): Primary | ICD-10-CM

## 2024-02-16 RX ORDER — TIRZEPATIDE 15 MG/.5ML
15 INJECTION, SOLUTION SUBCUTANEOUS WEEKLY
Qty: 2 ML | Refills: 0 | Status: SHIPPED | OUTPATIENT
Start: 2024-02-16

## 2024-02-16 RX ORDER — TIRZEPATIDE 15 MG/.5ML
15 INJECTION, SOLUTION SUBCUTANEOUS WEEKLY
Qty: 2 ML | Refills: 0 | Status: SHIPPED | OUTPATIENT
Start: 2024-02-16 | End: 2024-02-16

## 2024-02-16 NOTE — TELEPHONE ENCOUNTER
Pt out of Mounjaro 12.5 mg dosage, and cannot find supply at his pharmacy or other local pharmacies, dose is due today. Pt also states that a PA would be needed for [facility] when he tried to transfer the prescription, it appears that a PA was submitted on 2/13/24, still no response noted in [employer] at this time.     According to conversation with our reps Mounjaro 12.5 mg dose has been on back order, recommendations included [facility] or maybe trying other doses if available. Asked pt to confirm about other doses if he knows. He states that the pharmacies he spoke with were out of both 10 mg and 12.5 mg. He stated [facility] might have had Mounjaro 10 mg, called pharmacy to confirm, staff stated they do not have 10 mg, 12.5 mg, or 15 mg dosages at this time, just the lower doses.     We currently only have Mounjaro 2.5 mg samples in [place].

## 2024-02-16 NOTE — TELEPHONE ENCOUNTER
Prescription for increase dose of Mounjaro 15mg injection weekly sent to [facility].  If patient unable to obtain let us know and we can send to [facility].    Cinthia HERBERT, BC-ADM, [facility]

## 2024-02-16 NOTE — TELEPHONE ENCOUNTER
Pt states that [facility] does not have the Mounjaro 15 mg dosage of the medication, pended the order to [facility] in [place].     Since they are a mail order pharmacy is it okay to tell pt he can just dose the Mounjaro when he gets it as long as 3 days before next dose?

## 2024-02-16 NOTE — TELEPHONE ENCOUNTER
From: Delroy Lanier  To: Cinthia Espinoza  Sent: 2/15/2024 9:53 PM CST  Subject: Mounjaro refill    Hello! I have ran out of Mounjaro and apparently so has every pharmacy. I tried transferring the request/ replenishing through [facility] but it requires a prior authorization. What should I do? I’m scheduled to take my next dose tomorrow.

## 2024-02-19 ENCOUNTER — TELEPHONE (OUTPATIENT)
Dept: ENDOCRINOLOGY CLINIC | Facility: CLINIC | Age: 44
End: 2024-02-19

## 2024-02-19 NOTE — TELEPHONE ENCOUNTER
Received PA for Mounjaro 15mg patient picked up 2/8/24 Mounjaro 12.5mg       Dispensed Written Strength Quantity Refills Days Supply Provider Pharmacy    MOUNJARO 12.5 MG/0.5 ML PEN 02/08/2024 01/08/2024 12.5MG/0.5 2 Undefined 28 Cinthia Espinoza APRN [facility] [id number] ...     Still do PA?

## 2024-02-20 ENCOUNTER — OFFICE VISIT (OUTPATIENT)
Dept: ENDOCRINOLOGY CLINIC | Facility: CLINIC | Age: 44
End: 2024-02-20
Payer: COMMERCIAL

## 2024-02-20 VITALS
OXYGEN SATURATION: 98 % | SYSTOLIC BLOOD PRESSURE: 120 MMHG | HEART RATE: 81 BPM | DIASTOLIC BLOOD PRESSURE: 60 MMHG | BODY MASS INDEX: 27 KG/M2 | RESPIRATION RATE: 17 BRPM | WEIGHT: 190.13 LBS

## 2024-02-20 DIAGNOSIS — R80.9 TYPE 2 DIABETES MELLITUS WITH MICROALBUMINURIA, WITHOUT LONG-TERM CURRENT USE OF INSULIN (HCC): Primary | ICD-10-CM

## 2024-02-20 DIAGNOSIS — E78.2 MIXED HYPERLIPIDEMIA: ICD-10-CM

## 2024-02-20 DIAGNOSIS — E11.29 TYPE 2 DIABETES MELLITUS WITH MICROALBUMINURIA, WITHOUT LONG-TERM CURRENT USE OF INSULIN (HCC): Primary | ICD-10-CM

## 2024-02-20 PROCEDURE — 99214 OFFICE O/P EST MOD 30 MIN: CPT | Performed by: NURSE PRACTITIONER

## 2024-02-20 PROCEDURE — 95251 CONT GLUC MNTR ANALYSIS I&R: CPT | Performed by: NURSE PRACTITIONER

## 2024-02-20 RX ORDER — TIRZEPATIDE 15 MG/.5ML
15 INJECTION, SOLUTION SUBCUTANEOUS WEEKLY
Qty: 2 ML | Refills: 1 | Status: SHIPPED | OUTPATIENT
Start: 2024-02-20

## 2024-02-20 NOTE — TELEPHONE ENCOUNTER
Contacted number and spoke to alden GALVEZ over the phone with her   Case# [id number] will take 72 hours can check back

## 2024-02-20 NOTE — PROGRESS NOTES
HPI:   Delroy Lanier is a 43 year old male who presents for follow up for the management his diabetes.     Chief Complaint   Patient presents with    Diabetes     Follow up (angela)        Diabetes: improved, stable, at goal  Type: 2   Duration: > 10 years  Current Meds: Metformin ER 1000mg po bid, Mounjaro 15mg injection weekly   SE: denies  Long term insulin use: n/a  Failed Meds: Farxiga - mycotic infection, Metformin IR - diarrhea, Jardiance - mycotic infection, Ozempic - GI complaints     Complications:  Proteinuria      Personal Freestyle Angela 3 CGM  Analysis of data: 2024 - 2024  % Time CGM is Active: 93%  Sensor download: full report  in media  Average glucose : 154 mg/dl     CV (coefficient of variation) : 19.5%     19% time above 180mg /dl   0% time above 250 mg/dl  81% time in target range:  mg/dl   0% time below target range: 70mg/dl     Evaluation   1. Baseline glucose stable and in target range however somewhat elevated  2. Occasional postprandial elevations with return to baseline  3. Low likelihood of hypoglycemia  4. Normal glucose variability         Overall glucose control:   HGBA1C:    Lab Results   Component Value Date    A1C 6.4 (H) 2024    A1C 7.2 (H) 2023    A1C 6.6 (A) 2023     (H) 2023       Hypertension: stable, at goal  Blood Pressure: 120/60   Medication prescribed: lisinopril  SE: denies       Hyperlipidemia: stable, at goal  LDL: 55   Tri  Medication prescribed: atorvastatin  SE: denies       Wt Readings from Last 3 Encounters:   24 190 lb 1.9 oz (86.2 kg)   23 200 lb (90.7 kg)   23 198 lb 12.8 oz (90.2 kg)     BP Readings from Last 3 Encounters:   24 120/60   23 110/74   23 120/60          Past History:   He  has a past medical history of Bell's palsy, Bell's palsy, Blood pressure elevated without history of HTN, COVID-19 (07/15/2022), COVID-19 (10/15/2022), Essential hypertension, Other and  unspecified hyperlipidemia, Seasonal allergies, and Type II or unspecified type diabetes mellitus without mention of complication, not stated as uncontrolled.   His family history includes Diabetes in his maternal grandmother; Fibromyalgia in his mother; Heart Attack in his paternal grandmother; Heart Disease in his father; diabetes mellitus in his father and mother; heart failure in his father; respitory failure in his maternal grandfather.   He  reports that he has never smoked. He has never used smokeless tobacco. He reports current alcohol use. He reports that he does not use drugs.     He is allergic to allergy, grass, mold, pollen, and seasonal.     Current Outpatient Medications on File Prior to Visit   Medication Sig    montelukast 10 MG Oral Tab     lisinopril 2.5 MG Oral Tab Take 1 tablet (2.5 mg total) by mouth daily.    metFORMIN  MG Oral Tablet 24 Hr Take 2 tablets (1,000 mg total) by mouth 2 (two) times daily with meals.    atorvastatin 10 MG Oral Tab Take 1 tablet (10 mg total) by mouth nightly.    [DISCONTINUED] Tirzepatide (MOUNJARO) 15 MG/0.5ML Subcutaneous Solution Pen-injector Inject 15 mg into the skin once a week.    Amphetamine-Dextroamphet ER (ADDERALL XR) 20 MG Oral Capsule SR 24 Hr Take 1 capsule (20 mg total) by mouth daily.    [START ON 3/15/2024] Amphetamine-Dextroamphet ER (ADDERALL XR) 20 MG Oral Capsule SR 24 Hr Take 1 capsule (20 mg total) by mouth daily.    [START ON 4/15/2024] Amphetamine-Dextroamphet ER (ADDERALL XR) 20 MG Oral Capsule SR 24 Hr Take 1 capsule (20 mg total) by mouth daily.    triamcinolone 55 MCG/ACT Nasal Aerosol 2 sprays by Nasal route daily.    Amphetamine-Dextroamphet ER 20 MG Oral Capsule SR 24 Hr Take 1 capsule (20 mg total) by mouth every morning.    FREESTYLE DANILO 3 SENSOR Does not apply Misc 1 each every 14 (fourteen) days.     No current facility-administered medications on file prior to visit.       CMP  (most recent labs)   Lab Results    Component Value Date/Time     (H) 02/06/2024 08:07 AM    BUN 16 02/06/2024 08:07 AM    CREATSERUM 0.63 02/06/2024 08:07 AM    GFRNAA 117 06/16/2022 12:00 AM    GFRAA 147 07/15/2021 07:37 AM    EGFRCR 123 02/06/2024 08:07 AM    CA 9.1 02/06/2024 08:07 AM    ALKPHO 67 02/06/2024 08:07 AM    AST 13 02/06/2024 08:07 AM    ALT 29 02/06/2024 08:07 AM    BILT 0.3 02/06/2024 08:07 AM    TP 6.8 02/06/2024 08:07 AM    ALB 4.0 02/06/2024 08:07 AM     02/06/2024 08:07 AM    K 4.5 02/06/2024 08:07 AM     02/06/2024 08:07 AM    CO2 30 02/06/2024 08:07 AM           Lipids  (most recent labs)   Lab Results   Component Value Date/Time    CHOLEST 107 02/06/2024 08:07 AM    TRIG 121 02/06/2024 08:07 AM    HDL 32 (L) 02/06/2024 08:07 AM    LDL 55 02/06/2024 08:07 AM    NONHDLC 75 02/06/2024 08:07 AM          Diabetes  (most recent labs)   Lab Results   Component Value Date/Time    A1C 6.4 (H) 02/06/2024 08:07 AM          Microalb (most recent labs)   Lab Results   Component Value Date/Time    MICROALBUMIN 1.5 02/06/2024 08:07 AM    MICROALBCREA 70.0 (H) 06/05/2023 09:18 AM        Lab results reviewed with patient.    REVIEW OF SYSTEMS:   GENERAL HEALTH: feels well otherwise  SKIN: denies any unusual skin lesions or rashes  RESPIRATORY: denies shortness of breath with exertion  CARDIOVASCULAR: denies chest pain on exertion  GI: denies nausea, abdominal pain or heartburn  NEURO: denies headaches and denies numbness and tingling to extremities  ENDO: denies polydipsia, polyuria or polyphagia, denies unexplained weight changes    EXAM:   /60   Pulse 81   Resp 17   Wt 190 lb 1.9 oz (86.2 kg)   SpO2 98%   BMI 27.28 kg/m²  Estimated body mass index is 27.28 kg/m² as calculated from the following:    Height as of 9/7/23: 5' 10\" (1.778 m).    Weight as of this encounter: 190 lb 1.9 oz (86.2 kg).   Physical Exam  Vitals reviewed.   Constitutional:       Appearance: Normal appearance.   Cardiovascular:      Pulses:            Dorsalis pedis pulses are 2+ on the right side and 2+ on the left side.   Pulmonary:      Effort: Pulmonary effort is normal.   Feet:      Right foot:      Protective Sensation: 5 sites tested.  5 sites sensed.      Skin integrity: Callus present.      Toenail Condition: Right toenails are normal.      Left foot:      Protective Sensation: 5 sites tested.  5 sites sensed.      Skin integrity: Callus present.      Toenail Condition: Left toenails are normal.      Comments: Diabetes foot exam performed: Vibration to dorsum to the first toe perceived bilaterally.  Foot care practices reviewed with patient.    Neurological:      Mental Status: He is alert and oriented to person, place, and time.   Psychiatric:         Mood and Affect: Mood normal.         Behavior: Behavior normal.         ASSESSMENT AND PLAN:   As for his Diabetes, it is well controlled, stable, improved, no significant medication side effects noted.   Recommendations are: continue present meds, lose weight by increased dietary compliance and exercise, and check feet daily    Patient to continue Metformin ER 1000mg po bid and Mounjaro 15mg injection weekly.    Patient to continue to use personal Freestyle Angela 3 CGM for glucose monitoring.  Discussed self monitoring blood glucose and the importance of monitoring.  Patient agreed to monitoring bid - fasting and 2 hours postprandial of one meal per day if not using CGM.    Reinforced healthy eating in healthy portions and increasing daily physical activity.    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       As for  his hypertension, Blood Pressure is well controlled, stable, no significant medication side effects noted.   PLAN: will continue present medications, reviewed diet, exercise and weight control, continue ACEI therapy.     As for his cholesterol, Lipids are well controlled, stable, no significant medication side effects noted.   PLAN: will continue present medications, reviewed diet, exercise and weight control, continue statin therapy.   Discussed increasing fiber intake in diet to improve HDL levels.    DM Health Maintenance  Last dilated eye exam: Last Dilated Eye Exam: 24   Exam shows retinopathy? Eye Exam shows Diabetic Retinopathy?: No    Last diabetic foot exam: Last Foot Exam: 24    Date of last PHQ-2 depression screen: PHQ-2 - Date of last depression screenin2024    Patient  reports that he has never smoked. He has never used smokeless tobacco.  When is flu vaccine due? No recommendations at this time  When is pneumonia vaccine due? Pneumococcal Vaccination(1 of 2 - PCV) Never done    The patient indicates understanding of these issues and agrees to the plan.  Refills addressed at time of office visit.    Diagnoses and all orders for this visit:    Type 2 diabetes mellitus with microalbuminuria, without long-term current use of insulin  (HCC)  -     Tirzepatide (MOUNJARO) 15 MG/0.5ML Subcutaneous Solution Pen-injector; Inject 15 mg into the skin once a week.  -     HGB A1C [496] [Q]    Mixed hyperlipidemia       Return in about 3 months (around 2024) for 45 minute appointment, Personal CGM, Video Visit.    The risks and benefits of my recommendations, as well as other treatment options were discussed with the patient today. Questions were answered to the best of my knowledge.   25 min spent with patient and >50% time spent counseling and coordinating care related to their office visit.      Cinthia HERBERT, BC-ADM, [facility]

## 2024-02-20 NOTE — TELEPHONE ENCOUNTER
Delroy Lanier (Last: [id number])    [facility] is unable to respond with clinical questions. Please contact [facility] at [phone].

## 2024-02-21 ENCOUNTER — PATIENT MESSAGE (OUTPATIENT)
Dept: FAMILY MEDICINE CLINIC | Facility: CLINIC | Age: 44
End: 2024-02-21

## 2024-02-21 NOTE — TELEPHONE ENCOUNTER
Per OV note dated 2024:     ASSESSMENT/PLAN:    Seasonal allergies (primary encounter diagnosis)  Chronic cough  Nasal congestion    Nasal congestion, rhinorrhea, and post nasal drip. Exam consistent with allergic rhinitis. I have recommended sinus rinse and nasal steroids. Can add antihistamine as well. Will see back in one month to discuss improvement. Could benefit from septoplasty. Will assess in 1-2 months.      Orders This Visit:  No orders of the defined types were placed in this encounter.    Meds This Visit:  Requested Prescriptions    Signed Prescriptions Disp Refills  fluticasone propionate 50 MCG/ACT Nasal Suspension 30 mL 0  Si sprays by Each Nare route daily.    Imaging & Referrals:  None

## 2024-02-21 NOTE — TELEPHONE ENCOUNTER
From: Delroy Lanier  To: Rajesh Saleh  Sent: 2/21/2024 11:38 AM CST  Subject: Consistent cough    Good morning. After an ENT visit he saw nothing wrong with my sinuses and ended up doing an endoscopy but my cough still won’t go away and it’s hurting as the coughs are getting more of a force feel. Is there anything I should do?

## 2024-02-26 ENCOUNTER — TELEPHONE (OUTPATIENT)
Dept: ENDOCRINOLOGY CLINIC | Facility: CLINIC | Age: 44
End: 2024-02-26

## 2024-02-26 NOTE — TELEPHONE ENCOUNTER
Received call from [facility] spoke to Alysia for Mounajro 15mg asked if they had it in stock stated they didn't informed they could just cancel Rx since there is no stock. Alysia will notify patient and I notified to [employer] that patient is to look at local pharmacies

## 2024-04-08 ENCOUNTER — PATIENT MESSAGE (OUTPATIENT)
Dept: ENDOCRINOLOGY CLINIC | Facility: CLINIC | Age: 44
End: 2024-04-08

## 2024-04-08 DIAGNOSIS — R80.9 TYPE 2 DIABETES MELLITUS WITH MICROALBUMINURIA, WITHOUT LONG-TERM CURRENT USE OF INSULIN (HCC): ICD-10-CM

## 2024-04-08 DIAGNOSIS — E11.29 TYPE 2 DIABETES MELLITUS WITH MICROALBUMINURIA, WITHOUT LONG-TERM CURRENT USE OF INSULIN (HCC): ICD-10-CM

## 2024-04-08 RX ORDER — TIRZEPATIDE 15 MG/.5ML
15 INJECTION, SOLUTION SUBCUTANEOUS WEEKLY
Qty: 6 ML | Refills: 1 | Status: SHIPPED | OUTPATIENT
Start: 2024-04-08

## 2024-04-08 NOTE — TELEPHONE ENCOUNTER
Pt requesting [facility]ill to [facility] - pended    Requested Prescriptions     Pending Prescriptions Disp Refills    Tirzepatide (MOUNJARO) 15 MG/0.5ML Subcutaneous Solution Pen-injector 2 mL 1     Sig: Inject 15 mg into the skin once a week.     Your appointments       Date & Time Appointment Department ([place])    May 24, 2024  7:30 AM CDT Video Visit with Cinthia Espinoza APRN [address] ([facility] [place])    Please verify your telehealth insurance benefits prior to your appointment.    You must be in the [facility] during the virtual visit.     Please use the [employer] Mobile [employer] and launch the video visit 10 minutes prior to your scheduled appointment time to ensure your camera and microphone are working properly. Once the video visit has started you will be placed in a waiting room until the provider begins the visit.     You will receive an email confirmation with instructions.  If you have questions, call your doctor's office directly.    If you are having issues or need to use a desktop/laptop, please follow the below steps:        1.       Close out all other open apps (could be competing for audio resources)  2.       Disable Bluetooth  3.       Reboot mobile device before joining the video  4.       Come off Wi-Fi and switch over to Data    Please see our Video Visit Tip Sheet if you need additional assistance.     If you believe this is an emergency, please dial 911 immediately.                [address]  [facility] [place]  [address] [facility] [address] [facility] [address]  [facility] [address] [phone]          Last A1c value was 6.4% done 2/6/2024.    Refill 2/20/24  LOV 2/20/24

## 2024-05-02 DIAGNOSIS — E11.29 TYPE 2 DIABETES MELLITUS WITH MICROALBUMINURIA, WITHOUT LONG-TERM CURRENT USE OF INSULIN (HCC): ICD-10-CM

## 2024-05-02 DIAGNOSIS — R80.9 TYPE 2 DIABETES MELLITUS WITH MICROALBUMINURIA, WITHOUT LONG-TERM CURRENT USE OF INSULIN (HCC): ICD-10-CM

## 2024-05-02 RX ORDER — TIRZEPATIDE 15 MG/.5ML
15 INJECTION, SOLUTION SUBCUTANEOUS WEEKLY
Qty: 6 ML | Refills: 0 | Status: SHIPPED | OUTPATIENT
Start: 2024-05-02

## 2024-05-02 NOTE — TELEPHONE ENCOUNTER
Requested Prescriptions     Pending Prescriptions Disp Refills    MOUNJARO 15 MG/0.5ML Subcutaneous Solution Pen-injector [Pharmacy Med Name: MOUNJARO 15MG/0.5ML 15MG/0.5ML INJ] 2 mL 0     Sig: INJECT 15 MG INTO THE SKIN ONCE A WEEK.     Your appointments       Date & Time Appointment Department ([place])    May 24, 2024 7:30 AM CDT Video Visit with Cinthia Espinoza APRN [address] ([facility] [place])    Please verify your telehealth insurance benefits prior to your appointment.    You must be in the [facility] during the virtual visit.     Please use the [employer] Mobile Edu[employer] and launch the video visit 10 minutes prior to your scheduled appointment time to ensure your camera and microphone are working properly. Once the video visit has started you will be placed in a waiting room until the provider begins the visit.     You will receive an email confirmation with instructions.  If you have questions, call your doctor's office directly.    If you are having issues or need to use a desktop/laptop, please follow the below steps:        1.       Close out all other open apps (could be competing for audio resources)  2.       Disable Bluetooth  3.       Reboot mobile device before joining the video  4.       Come off Wi-Fi and switch over to Data    Please see our Video Visit Tip Sheet if you need additional assistance.     If you believe this is an emergency, please dial 911 immediately.                [address]  [facility] [place]  [address] [facility] [address] [facility] [phone]          Last A1c value was 6.4% done 2/6/2024.    Refill 4/8/24  LOV 2/20/24

## 2024-05-18 ENCOUNTER — LAB ENCOUNTER (OUTPATIENT)
Dept: LAB | Age: 44
End: 2024-05-18
Attending: FAMILY MEDICINE

## 2024-05-18 LAB
CHOLEST SERPL-MCNC: 104 MG/DL (ref ?–200)
CREAT UR-SCNC: 74.9 MG/DL
EST. AVERAGE GLUCOSE BLD GHB EST-MCNC: 126 MG/DL (ref 68–126)
FASTING PATIENT LIPID ANSWER: YES
HBA1C MFR BLD: 6 % (ref ?–5.7)
HDLC SERPL-MCNC: 39 MG/DL (ref 40–59)
LDLC SERPL CALC-MCNC: 50 MG/DL (ref ?–100)
MICROALBUMIN UR-MCNC: 3.12 MG/DL
MICROALBUMIN/CREAT 24H UR-RTO: 41.7 UG/MG (ref ?–30)
NONHDLC SERPL-MCNC: 65 MG/DL (ref ?–130)
TRIGL SERPL-MCNC: 73 MG/DL (ref 30–149)
VLDLC SERPL CALC-MCNC: 10 MG/DL (ref 0–30)

## 2024-05-21 DIAGNOSIS — E11.29 TYPE 2 DIABETES MELLITUS WITH MICROALBUMINURIA, WITHOUT LONG-TERM CURRENT USE OF INSULIN (HCC): ICD-10-CM

## 2024-05-21 DIAGNOSIS — R80.9 TYPE 2 DIABETES MELLITUS WITH MICROALBUMINURIA, WITHOUT LONG-TERM CURRENT USE OF INSULIN (HCC): ICD-10-CM

## 2024-05-22 RX ORDER — BLOOD-GLUCOSE SENSOR
EACH MISCELLANEOUS
Qty: 6 EACH | Refills: 0 | Status: SHIPPED | OUTPATIENT
Start: 2024-05-22

## 2024-05-22 NOTE — TELEPHONE ENCOUNTER
Requested Prescriptions     Pending Prescriptions Disp Refills    FREESTYLE DANILO 3 SENSOR Does not apply Misc [Pharmacy Med Name: FREESTYLE DANILO 3 SENSOR KIT] 6 each 0     Sig: REPLACE SENSOR EVERY OTHER WEEK     Your appointments       Date & Time Appointment Department ([place])    May 24, 2024 7:30 AM CDT Video Visit with Cinthia Espinoza APRN [facility], [address] ([facility] [place])    Please verify your telehealth insurance benefits prior to your appointment.    You must be in the [facility] during the virtual visit.     Please use the [employer] Mobile [employer] and launch the video visit 10 minutes prior to your scheduled appointment time to ensure your camera and microphone are working properly. Once the video visit has started you will be placed in a waiting room until the provider begins the visit.     You will receive an email confirmation with instructions.  If you have questions, call your doctor's office directly.    If you are having issues or need to use a desktop/laptop, please follow the below steps:        1.       Close out all other open apps (could be competing for audio resources)  2.       Disable Bluetooth  3.       Reboot mobile device before joining the video  4.       Come off Wi-Fi and switch over to Data    Please see our Video Visit Tip Sheet if you need additional assistance.     If you believe this is an emergency, please dial 911 immediately.                [address]  [facility] [place]  [address] [facility] [address] [facility] [address]  [phone]          Last A1c value was 6% done 5/18/2024.    Refill 5/18/23  LOV 2/20/24

## 2024-05-24 ENCOUNTER — TELEMEDICINE (OUTPATIENT)
Dept: ENDOCRINOLOGY CLINIC | Facility: CLINIC | Age: 44
End: 2024-05-24

## 2024-05-24 DIAGNOSIS — R80.9 TYPE 2 DIABETES MELLITUS WITH MICROALBUMINURIA, WITHOUT LONG-TERM CURRENT USE OF INSULIN (HCC): Primary | ICD-10-CM

## 2024-05-24 DIAGNOSIS — E78.2 MIXED HYPERLIPIDEMIA: ICD-10-CM

## 2024-05-24 DIAGNOSIS — E11.29 TYPE 2 DIABETES MELLITUS WITH MICROALBUMINURIA, WITHOUT LONG-TERM CURRENT USE OF INSULIN (HCC): Primary | ICD-10-CM

## 2024-05-24 PROCEDURE — 99214 OFFICE O/P EST MOD 30 MIN: CPT | Performed by: NURSE PRACTITIONER

## 2024-05-24 PROCEDURE — 95251 CONT GLUC MNTR ANALYSIS I&R: CPT | Performed by: NURSE PRACTITIONER

## 2024-05-24 RX ORDER — METFORMIN HYDROCHLORIDE 500 MG/1
1000 TABLET, EXTENDED RELEASE ORAL 2 TIMES DAILY WITH MEALS
Qty: 360 TABLET | Refills: 3 | Status: SHIPPED | OUTPATIENT
Start: 2024-05-24

## 2024-05-24 RX ORDER — ATORVASTATIN CALCIUM 10 MG/1
10 TABLET, FILM COATED ORAL NIGHTLY
Qty: 90 TABLET | Refills: 3 | Status: SHIPPED | OUTPATIENT
Start: 2024-05-24

## 2024-05-24 RX ORDER — LISINOPRIL 2.5 MG/1
2.5 TABLET ORAL DAILY
Qty: 90 TABLET | Refills: 3 | Status: SHIPPED | OUTPATIENT
Start: 2024-05-24

## 2024-05-24 RX ORDER — TIRZEPATIDE 10 MG/.5ML
10 INJECTION, SOLUTION SUBCUTANEOUS WEEKLY
Qty: 2 ML | Refills: 1 | Status: SHIPPED | OUTPATIENT
Start: 2024-05-24

## 2024-05-24 NOTE — PROGRESS NOTES
HPI:   Delroy Lanier is a 43 year old male who presents virtually for the management of his diabetes.   This visit is conducted using Telemedicine with live, two way, interactive video and audio.  Patient verbally consents to Telemedicine visit.  Patient understands and accepts financial responsibility for any deductible, co-insurance and/or co-pays associated with this service.    Chief Complaint: Diabetes Follow Up    Diabetes: stable, at goal  Type: 2   Duration:approximately 11 years  Current Meds: Metformin ER 1000mg po bid, Mounjaro 15mg injection weekly    SE: loss of appetite, fatigue  Long term insulin use: no  Failed Meds: Farxiga & Jardiance - mycotic infection, Metformin IR - diarrhea, Ozempic - GI effects    Complications: Proteinuria    Personal Freestyle Angela CGM  Analysis of data: 5/9/2024 - 5/22/2024  % Time CGM is Active: 92%  Sensor download: full report  in media  Average glucose : 107 mg/dl     CV (coefficient of variation) : 16.7%     0% time above 180mg /dl   0% time above 250 mg/dl  100% time in target range:  mg/dl   0% time below target range: 70mg/dl     Evaluation   1. Baseline glucose stable and in target range  2. Minimal postprandial elevations  3. Low likelihood of hypoglycemia  4. Normal glucose variability         Overall glucose control:   HGBA1C:    Lab Results   Component Value Date    A1C 6.0 (H) 05/18/2024    A1C 6.4 (H) 02/06/2024    A1C 7.2 (H) 08/30/2023     05/18/2024          Wt Readings from Last 3 Encounters:   02/20/24 190 lb 1.9 oz (86.2 kg)   09/07/23 200 lb (90.7 kg)   06/05/23 198 lb 12.8 oz (90.2 kg)           Past History:   He  has a past medical history of Bell's palsy, Bell's palsy, Blood pressure elevated without history of HTN, COVID-19 (07/15/2022), COVID-19 (10/15/2022), Essential hypertension, Other and unspecified hyperlipidemia, Seasonal allergies, and Type II or unspecified type diabetes mellitus without mention of complication, not  stated as uncontrolled.   His family history includes Diabetes in his maternal grandmother; Fibromyalgia in his mother; Heart Attack in his paternal grandmother; Heart Disease in his father; diabetes mellitus in his father and mother; heart failure in his father; respitory failure in his maternal grandfather.   He  reports that he has never smoked. He has never used smokeless tobacco. He reports current alcohol use. He reports that he does not use drugs.     He is allergic to allergy, grass, mold, pollen, and seasonal.     Current Outpatient Medications on File Prior to Visit   Medication Sig    FREESTYLE DANILO 3 SENSOR Does not apply Misc REPLACE SENSOR EVERY OTHER WEEK    [DISCONTINUED] Tirzepatide (MOUNJARO) 15 MG/0.5ML Subcutaneous Solution Pen-injector INJECT 15 MG INTO THE SKIN ONCE A WEEK.    triamcinolone 55 MCG/ACT Nasal Aerosol 2 sprays by Nasal route daily.    montelukast 10 MG Oral Tab     Amphetamine-Dextroamphet ER 20 MG Oral Capsule SR 24 Hr Take 1 capsule (20 mg total) by mouth every morning.    [DISCONTINUED] lisinopril 2.5 MG Oral Tab Take 1 tablet (2.5 mg total) by mouth daily.    [DISCONTINUED] metFORMIN  MG Oral Tablet 24 Hr Take 2 tablets (1,000 mg total) by mouth 2 (two) times daily with meals.    [DISCONTINUED] atorvastatin 10 MG Oral Tab Take 1 tablet (10 mg total) by mouth nightly.    [DISCONTINUED] FREESTYLE DANILO 3 SENSOR Does not apply Misc 1 each every 14 (fourteen) days.     No current facility-administered medications on file prior to visit.       CMP  (most recent labs)   Lab Results   Component Value Date/Time     (H) 02/06/2024 08:07 AM    BUN 16 02/06/2024 08:07 AM    CREATSERUM 0.63 02/06/2024 08:07 AM    GFRNAA 117 06/16/2022 12:00 AM    GFRAA 147 07/15/2021 07:37 AM    EGFRCR 123 02/06/2024 08:07 AM    CA 9.1 02/06/2024 08:07 AM    ALKPHO 67 02/06/2024 08:07 AM    AST 13 02/06/2024 08:07 AM    ALT 29 02/06/2024 08:07 AM    BILT 0.3 02/06/2024 08:07 AM    TP 6.8  02/06/2024 08:07 AM    ALB 4.0 02/06/2024 08:07 AM     02/06/2024 08:07 AM    K 4.5 02/06/2024 08:07 AM     02/06/2024 08:07 AM    CO2 30 02/06/2024 08:07 AM           Lipids  (most recent labs)   Lab Results   Component Value Date/Time    CHOLEST 104 05/18/2024 07:30 AM    TRIG 73 05/18/2024 07:30 AM    HDL 39 (L) 05/18/2024 07:30 AM    LDL 50 05/18/2024 07:30 AM    NONHDLC 65 05/18/2024 07:30 AM          Diabetes  (most recent labs)   Lab Results   Component Value Date/Time    A1C 6.0 (H) 05/18/2024 07:30 AM          Microalb (most recent labs)   Lab Results   Component Value Date/Time    MICROALBUMIN 1.5 02/06/2024 08:07 AM    MICROALBCREA 41.7 (H) 05/18/2024 07:30 AM        Lab results reviewed with patient.    REVIEW OF SYSTEMS:   GENERAL HEALTH: c/o fatigue  SKIN: denies any unusual skin lesions or rashes  RESPIRATORY: denies shortness of breath with exertion  CARDIOVASCULAR: denies chest pain on exertion  GI: c/o loss of appetite, denies abdominal pain and denies heartburn  NEURO: denies headaches     EXAM:   Physical Exam  Pulmonary:      Comments: Able to speak in complete sentences without difficulty  Neurological:      Mental Status: He is alert and oriented to person, place, and time.   Psychiatric:         Mood and Affect: Mood normal.         Behavior: Behavior normal.         ASSESSMENT AND PLAN:   Diabetes:  Patient to continue Metformin ER 1000mg po bid and decrease Mounjaro to 10mg injection weekly  Patient to continue to use personal Freestyle Angela CGM for glucose monitoring.  Discussed self monitoring blood glucose and the importance of monitoring.  Patient agreed to monitoring bid - fasting and 2 hours postprandial of one meal per day if not using CGM.  Reinforced healthy eating in healthy portions and increasing daily physical activity.  Reviewed ways to improve the protein in the urine:   Keep blood sugars in target range   Keep blood pressure in target range   Watch over the  counter medicines like NSAID (non steroidal anti-inflammatory drugs) these can be harmful to  kidneys (examples include: , Motrin , Advil, ibuprofen, naprosyn, Aleve)   Continue ACEI therapy - renoprotective    DM Health Maintenance  Last dilated eye exam: Last Dilated Eye Exam: 24   Exam shows retinopathy? Eye Exam shows Diabetic Retinopathy?: No    Last diabetic foot exam: Last Foot Exam: 24    Date of last PHQ-2 depression screen: PHQ-2 - Date of last depression screenin2024    Patient  reports that he has never smoked. He has never used smokeless tobacco.  When is flu vaccine due? No recommendations at this time  When is pneumonia vaccine due? Pneumococcal Vaccination(1 of 2 - PCV) Never done    The patient indicates understanding of these issues and agrees to the plan.  Refills addressed at time of office visit.    Diagnoses and all orders for this visit:    Type 2 diabetes mellitus with microalbuminuria, without long-term current use of insulin (HCC)  -     Tirzepatide (MOUNJARO) 10 MG/0.5ML Subcutaneous Solution Pen-injector; Inject 10 mg into the skin once a week.  -     lisinopril 2.5 MG Oral Tab; Take 1 tablet (2.5 mg total) by mouth daily.  -     metFORMIN  MG Oral Tablet 24 Hr; Take 2 tablets (1,000 mg total) by mouth 2 (two) times daily with meals.    Mixed hyperlipidemia  -     atorvastatin 10 MG Oral Tab; Take 1 tablet (10 mg total) by mouth nightly.           Return in about 3 months (around 2024) for 45 minute appointment, Personal CGM, Virtual Visit.    The risks and benefits of my recommendations, as well as other treatment options were discussed with the patient today. questions were answered to the best of my knowledge.  Patient verbalizes understanding and amendable to plan of care.  Duration of this service:  12 minutes   Cinthia HERBERT, BC-ADM, [facility]

## 2024-06-20 ENCOUNTER — TELEPHONE (OUTPATIENT)
Dept: ENDOCRINOLOGY CLINIC | Facility: CLINIC | Age: 44
End: 2024-06-20

## 2024-06-20 NOTE — TELEPHONE ENCOUNTER
Fax received from [email or web address] Donor Participation form.    Prepped and placed on [facility] desk for GLENN

## 2024-08-12 ENCOUNTER — TELEPHONE (OUTPATIENT)
Dept: ENDOCRINOLOGY CLINIC | Facility: CLINIC | Age: 44
End: 2024-08-12

## 2024-08-16 ENCOUNTER — TELEPHONE (OUTPATIENT)
Dept: ENDOCRINOLOGY CLINIC | Facility: CLINIC | Age: 44
End: 2024-08-16

## 2024-08-16 NOTE — TELEPHONE ENCOUNTER
Fax received from [employer] Donor Participation form.     Prepped and placed Cinthia's desk for completion.

## 2024-08-21 LAB — HEMOGLOBIN A1C: 5.8 % OF TOTAL HGB

## 2024-08-23 ENCOUNTER — TELEMEDICINE (OUTPATIENT)
Dept: ENDOCRINOLOGY CLINIC | Facility: CLINIC | Age: 44
End: 2024-08-23
Payer: COMMERCIAL

## 2024-08-23 DIAGNOSIS — E78.2 MIXED HYPERLIPIDEMIA: ICD-10-CM

## 2024-08-23 DIAGNOSIS — E11.29 TYPE 2 DIABETES MELLITUS WITH MICROALBUMINURIA, WITHOUT LONG-TERM CURRENT USE OF INSULIN (HCC): Primary | ICD-10-CM

## 2024-08-23 DIAGNOSIS — R80.9 TYPE 2 DIABETES MELLITUS WITH MICROALBUMINURIA, WITHOUT LONG-TERM CURRENT USE OF INSULIN (HCC): Primary | ICD-10-CM

## 2024-08-23 PROCEDURE — 99214 OFFICE O/P EST MOD 30 MIN: CPT | Performed by: NURSE PRACTITIONER

## 2024-08-23 PROCEDURE — 95251 CONT GLUC MNTR ANALYSIS I&R: CPT | Performed by: NURSE PRACTITIONER

## 2024-08-23 RX ORDER — METFORMIN HCL 500 MG
500 TABLET, EXTENDED RELEASE 24 HR ORAL 2 TIMES DAILY WITH MEALS
Qty: 180 TABLET | Refills: 1 | Status: SHIPPED | OUTPATIENT
Start: 2024-08-23

## 2024-08-23 NOTE — PROGRESS NOTES
HPI:   Delroy Lanier is a 44 year old male who presents virtually for the management of his diabetes.   This visit is conducted using Telemedicine with live, two way, interactive video and audio.  Patient verbally consents to Telemedicine visit.  Patient understands and accepts financial responsibility for any deductible, co-insurance and/or co-pays associated with this service.    Chief Complaint: Diabetes Follow Up    Diabetes: stable, at goal  Type: 2   Duration:approximately 11 years  Current Meds: Metformin ER 1000mg po bid, Mounjaro 10mg injection weekly    SE: denies  Long term insulin use: no  Failed Meds: Farxiga & Jardiance - mycotic infection, Metformin IR - diarrhea, Ozempic - GI effects    Complications: Proteinuria    Personal Freestyle Angela CGM  Analysis of data: 8/9/2024 - 8/22/2024  % Time CGM is Active: 91%  Sensor download: full report  in media  Average glucose : 108 mg/dl   GMI: 5.9%    CV (coefficient of variation) : 17.8%     1% time above 180mg /dl   0% time above 250 mg/dl  99% time in target range:  mg/dl   0% time below target range: 70mg/dl     Evaluation   1. Baseline glucose stable and in target range  2. Minimal postprandial elevations  3. Low likelihood of hypoglycemia  4. Normal glucose variability         Overall glucose control:   HGBA1C:    Lab Results   Component Value Date    A1C 5.8 (H) 08/20/2024    A1C 6.0 (H) 05/18/2024    A1C 6.4 (H) 02/06/2024     05/18/2024          Wt Readings from Last 3 Encounters:   02/20/24 190 lb 1.9 oz (86.2 kg)   09/07/23 200 lb (90.7 kg)   06/05/23 198 lb 12.8 oz (90.2 kg)           Past History:   He  has a past medical history of Bell's palsy, Bell's palsy, Blood pressure elevated without history of HTN, COVID-19 (07/15/2022), COVID-19 (10/15/2022), Essential hypertension, Other and unspecified hyperlipidemia, Seasonal allergies, and Type II or unspecified type diabetes mellitus without mention of complication, not stated as  uncontrolled.   His family history includes Diabetes in his maternal grandmother; Fibromyalgia in his mother; Heart Attack in his paternal grandmother; Heart Disease in his father; diabetes mellitus in his father and mother; heart failure in his father; respitory failure in his maternal grandfather.   He  reports that he has never smoked. He has never used smokeless tobacco. He reports current alcohol use. He reports that he does not use drugs.     He is allergic to allergy, grass, mold, pollen, and seasonal.     Current Outpatient Medications on File Prior to Visit   Medication Sig    Tirzepatide (MOUNJARO) 10 MG/0.5ML Subcutaneous Solution Pen-injector Inject 10 mg into the skin once a week.    lisinopril 2.5 MG Oral Tab Take 1 tablet (2.5 mg total) by mouth daily.    [DISCONTINUED] metFORMIN  MG Oral Tablet 24 Hr Take 2 tablets (1,000 mg total) by mouth 2 (two) times daily with meals.    atorvastatin 10 MG Oral Tab Take 1 tablet (10 mg total) by mouth nightly.    FREESTYLE DANILO 3 SENSOR Does not apply Misc REPLACE SENSOR EVERY OTHER WEEK    triamcinolone 55 MCG/ACT Nasal Aerosol 2 sprays by Nasal route daily.    montelukast 10 MG Oral Tab     Amphetamine-Dextroamphet ER 20 MG Oral Capsule SR 24 Hr Take 1 capsule (20 mg total) by mouth every morning.     No current facility-administered medications on file prior to visit.       CMP  (most recent labs)   Lab Results   Component Value Date/Time     (H) 02/06/2024 08:07 AM    BUN 16 02/06/2024 08:07 AM    CREATSERUM 0.63 02/06/2024 08:07 AM    GFRNAA 117 06/16/2022 12:00 AM    GFRAA 147 07/15/2021 07:37 AM    EGFRCR 123 02/06/2024 08:07 AM    CA 9.1 02/06/2024 08:07 AM    ALKPHO 67 02/06/2024 08:07 AM    AST 13 02/06/2024 08:07 AM    ALT 29 02/06/2024 08:07 AM    BILT 0.3 02/06/2024 08:07 AM    TP 6.8 02/06/2024 08:07 AM    ALB 4.0 02/06/2024 08:07 AM     02/06/2024 08:07 AM    K 4.5 02/06/2024 08:07 AM     02/06/2024 08:07 AM    CO2 30  02/06/2024 08:07 AM           Lipids  (most recent labs)   Lab Results   Component Value Date/Time    CHOLEST 104 05/18/2024 07:30 AM    TRIG 73 05/18/2024 07:30 AM    HDL 39 (L) 05/18/2024 07:30 AM    LDL 50 05/18/2024 07:30 AM    NONHDLC 65 05/18/2024 07:30 AM          Diabetes  (most recent labs)   Lab Results   Component Value Date/Time    A1C 5.8 (H) 08/20/2024 07:45 AM          Microalb (most recent labs)   Lab Results   Component Value Date/Time    MICROALBUMIN 1.5 02/06/2024 08:07 AM    MICROALBCREA 41.7 (H) 05/18/2024 07:30 AM        Lab results reviewed with patient.    REVIEW OF SYSTEMS:   GENERAL HEALTH:feels well overall  SKIN: denies any unusual skin lesions or rashes  RESPIRATORY: denies shortness of breath with exertion  CARDIOVASCULAR: denies chest pain on exertion  GI: denies abdominal pain and denies heartburn  NEURO: denies headaches     EXAM:   Physical Exam  Pulmonary:      Comments: Able to speak in complete sentences without difficulty  Neurological:      Mental Status: He is alert and oriented to person, place, and time.   Psychiatric:         Mood and Affect: Mood normal.         Behavior: Behavior normal.         ASSESSMENT AND PLAN:   Diabetes:  Patient to continue Mounjaro 10mg injection weekly and decrease Metformin ER to 500 mg po bid.  Patient to continue to use personal Freestyle Angela CGM for glucose monitoring.  Discussed self monitoring blood glucose and the importance of monitoring.  Patient agreed to monitoring bid - fasting and 2 hours postprandial of one meal per day if not using CGM.  Reinforced healthy eating in healthy portions and increasing daily physical activity.  Reviewed ways to improve the protein in the urine:   Keep blood sugars in target range   Keep blood pressure in target range   Watch over the counter medicines like NSAID (non steroidal anti-inflammatory drugs) these can be harmful to  kidneys (examples include: , Motrin , Advil, ibuprofen, naprosyn, Aleve)    Continue ACEI therapy - renoprotective      Hyperlipidemia:  Patient to continue statin therapy.      DM Health Maintenance  Last dilated eye exam: Last Dilated Eye Exam: 24   Exam shows retinopathy? Eye Exam shows Diabetic Retinopathy?: No    Last diabetic foot exam: Last Foot Exam: 24    Date of last PHQ-2 depression screen: PHQ-2 - Date of last depression screenin2024    Patient  reports that he has never smoked. He has never used smokeless tobacco.  When is flu vaccine due? No recommendations at this time  When is pneumonia vaccine due? Pneumococcal Vaccination(1 of 2 - PCV) Never done    The patient indicates understanding of these issues and agrees to the plan.  Refills addressed at time of office visit.    Diagnoses and all orders for this visit:    Type 2 diabetes mellitus with microalbuminuria, without long-term current use of insulin (HCC)  -     COMP METABOLIC PANEL [88638] [Q]  -     HGB A1C [496] [Q]  -     MICROALB/CREAT RATIO, RANDOM URINE [6517] [Q]  -     metFORMIN  MG Oral Tablet 24 Hr; Take 1 tablet (500 mg total) by mouth 2 (two) times daily with meals.    Mixed hyperlipidemia  -     LIPID PANEL [7600] [Q]             Return in about 6 months (around 2025) for Personal CGM, 45 minute appointment.    The risks and benefits of my recommendations, as well as other treatment options were discussed with the patient today. questions were answered to the best of my knowledge.  Patient verbalizes understanding and amendable to plan of care.  Duration of this service:  12 minutes   Cinthia HERBERT, BC-ADM, [facility]

## 2024-08-24 DIAGNOSIS — R80.9 TYPE 2 DIABETES MELLITUS WITH MICROALBUMINURIA, WITHOUT LONG-TERM CURRENT USE OF INSULIN (HCC): ICD-10-CM

## 2024-08-24 DIAGNOSIS — E11.29 TYPE 2 DIABETES MELLITUS WITH MICROALBUMINURIA, WITHOUT LONG-TERM CURRENT USE OF INSULIN (HCC): ICD-10-CM

## 2024-08-26 RX ORDER — BLOOD-GLUCOSE SENSOR
EACH MISCELLANEOUS
Qty: 6 EACH | Refills: 3 | Status: SHIPPED | OUTPATIENT
Start: 2024-08-26

## 2024-08-26 NOTE — TELEPHONE ENCOUNTER
Requested Prescriptions     Pending Prescriptions Disp Refills    FREESTYLE DANILO 3 SENSOR Does not apply Misc [Pharmacy Med Name: FREESTYLE DANILO 3 SENSOR KIT] 6 each 0     Sig: APPLY 1  EVERY OTHER WEEK     HGBA1C:    Lab Results   Component Value Date    A1C 5.8 (H) 08/20/2024    A1C 6.0 (H) 05/18/2024    A1C 6.4 (H) 02/06/2024     05/18/2024     Your Appointments      Monday February 24, 2025 7:30 AM  Diabetes Pump follow up with KEON Thao  [facility] [address] ([facility] [place] [address] [place] [address] [phone]               Last OFFICE VISIT:  8--DH    Last refill:  5--

## 2024-09-25 NOTE — TELEPHONE ENCOUNTER
Requested Prescriptions     Pending Prescriptions Disp Refills    MOUNJARO 10 MG/0.5ML Subcutaneous Solution Pen-injector [Pharmacy Med Name: Mounjaro 10 MG/0.5ML Subcutaneous Solution Pen-injector] 4 mL 0     Sig: INJECT 10 MG SUBCUTANEOUSLY ONCE A WEEK     Your appointments       Date & Time Appointment Department ([place])    Feb 20, 2024  7:30 AM CST Diabetes Pump follow up with Cinthia Espinoza APRN [address] ([facility] [place])              [facility] [address]  [facility] [place]  [address] [facility] [address]  [phone]          HGBA1C:    Lab Results   Component Value Date    A1C 7.2 (H) 08/30/2023    A1C 6.6 (A) 06/05/2023    A1C 6.7 (A) 02/27/2023     (H) 08/30/2023     LOV: 10-13-23    REFILL: 12-15-23   What Type Of Note Output Would You Prefer (Optional)?: Standard Output How Severe Is Your Skin Lesion?: moderate Has Your Skin Lesion Been Treated?: not been treated Is This A New Presentation, Or A Follow-Up?: Skin Lesions

## 2024-10-18 DIAGNOSIS — E11.29 TYPE 2 DIABETES MELLITUS WITH MICROALBUMINURIA, WITHOUT LONG-TERM CURRENT USE OF INSULIN (HCC): ICD-10-CM

## 2024-10-18 DIAGNOSIS — R80.9 TYPE 2 DIABETES MELLITUS WITH MICROALBUMINURIA, WITHOUT LONG-TERM CURRENT USE OF INSULIN (HCC): ICD-10-CM

## 2024-10-18 NOTE — TELEPHONE ENCOUNTER
Requested Prescriptions     Pending Prescriptions Disp Refills    MOUNJARO 10 MG/0.5ML Subcutaneous Solution Pen-injector [Pharmacy Med Name: MOUNJARO 10 MG/0.5 ML PEN]  3     Sig: INJECT 1 SYRINGE UNDER THE SKIN ONCE A WEEK     HGBA1C:    Lab Results   Component Value Date    A1C 5.8 (H) 08/20/2024    A1C 6.0 (H) 05/18/2024    A1C 6.4 (H) 02/06/2024     05/18/2024     Your Appointments      Monday February 24, 2025 7:30 AM  Diabetes Pump follow up with KEON Thao  [facility] [address] ([facility] [place] [facility] [place] [address] [place] [id number] [phone]             Last OFFICE VISIT:8--    Last refill:  5--2 ml with 1 refills-

## 2024-11-20 ENCOUNTER — OFFICE VISIT (OUTPATIENT)
Dept: FAMILY MEDICINE CLINIC | Facility: CLINIC | Age: 44
End: 2024-11-20
Payer: COMMERCIAL

## 2024-11-20 VITALS
DIASTOLIC BLOOD PRESSURE: 62 MMHG | HEIGHT: 67 IN | RESPIRATION RATE: 16 BRPM | BODY MASS INDEX: 28.31 KG/M2 | WEIGHT: 180.38 LBS | OXYGEN SATURATION: 98 % | SYSTOLIC BLOOD PRESSURE: 122 MMHG | TEMPERATURE: 98 F | HEART RATE: 84 BPM

## 2024-11-20 DIAGNOSIS — J01.40 ACUTE PANSINUSITIS, RECURRENCE NOT SPECIFIED: Primary | ICD-10-CM

## 2024-11-20 PROCEDURE — 99213 OFFICE O/P EST LOW 20 MIN: CPT | Performed by: PHYSICIAN ASSISTANT

## 2024-11-20 NOTE — PROGRESS NOTES
CHIEF COMPLAINT:     Chief Complaint   Patient presents with    Allergies     Right ear itchiness, difficulty hearing. Sinus Drainage  Began 11/16  Zyrtec and propionate fluticasone OTC       HPI:   Delroy Lanier is a 44 year old male who presents with sinus symptoms for 4 days.  Symptoms have progressed into sinus congestion and have been worsening since onset.  The patient reports purulent nasal discharge, nasal congestion, sinus pressure, and post nasal drip. Patient also reports his allergies were getting worse leading up to the past 4 days.   Denies fever, dental pain, tinnitus, N/V/D, and cough.  The patient reports history of allergies.  The patient denies itchy/ watery eyes.  Has treated symptoms with Zyrtec and Flonase.  The patient does feel it is related to his allergies and is not feeling acutely ill.       Current Outpatient Medications   Medication Sig Dispense Refill    amoxicillin clavulanate 875-125 MG Oral Tab Take 1 tablet by mouth 2 (two) times daily for 10 days. 20 tablet 0    Tirzepatide (MOUNJARO) 10 MG/0.5ML Subcutaneous Solution Pen-injector INJECT 1 SYRINGE UNDER THE SKIN ONCE A WEEK 6 mL 1    FREESTYLE DANILO 3 SENSOR Does not apply Misc APPLY 1  EVERY OTHER WEEK 6 each 3    metFORMIN  MG Oral Tablet 24 Hr Take 1 tablet (500 mg total) by mouth 2 (two) times daily with meals. 180 tablet 1    lisinopril 2.5 MG Oral Tab Take 1 tablet (2.5 mg total) by mouth daily. 90 tablet 3    atorvastatin 10 MG Oral Tab Take 1 tablet (10 mg total) by mouth nightly. 90 tablet 3    triamcinolone 55 MCG/ACT Nasal Aerosol 2 sprays by Nasal route daily. 2 each 1    montelukast 10 MG Oral Tab       Amphetamine-Dextroamphet ER 20 MG Oral Capsule SR 24 Hr Take 1 capsule (20 mg total) by mouth every morning.        Past Medical History:    Bell's palsy    Bell's palsy    Blood pressure elevated without history of HTN    COVID-19    COVID-19    Essential hypertension    Other and unspecified hyperlipidemia     Seasonal allergies    Type II or unspecified type diabetes mellitus without mention of complication, not stated as uncontrolled      Past Surgical History:   Procedure Laterality Date    Knee arthroscp harv  6/11    Rt. Knee      Family History   Problem Relation Age of Onset    Heart Attack Paternal Grandmother     Diabetes Maternal Grandmother     Heart Disease Father     Other (diabetes mellitus) Father     Other (heart failure) Father     Fibromyalgia Mother     Other (diabetes mellitus) Mother     Other (respitory failure) Maternal Grandfather       Social History     Socioeconomic History    Marital status:    Tobacco Use    Smoking status: Never    Smokeless tobacco: Never   Vaping Use    Vaping status: Never Used   Substance and Sexual Activity    Alcohol use: Yes     Comment: 2-3 beers a week    Drug use: No    Sexual activity: Yes   Other Topics Concern    Caffeine Concern Yes     Comment: coffee 3 cups daily    Exercise Yes     Comment: erratically    Seat Belt Yes         REVIEW OF SYSTEMS:   GENERAL:  Normal appetite  SKIN: no rashes or abnormal skin lesions  HEENT: See HPI.    LUNGS: denies shortness of breath or wheezing, See HPI  CARDIOVASCULAR: denies chest pain or palpitations   GI: denies N/V/C or abdominal pain  NEURO: + sinus headaches.  No numbness or tingling in face.    EXAM:   /62 (BP Location: Left arm, Patient Position: Sitting, Cuff Size: adult)   Pulse 84   Temp 98 °F (36.7 °C) (Oral)   Resp 16   Ht 5' 7\" (1.702 m)   Wt 180 lb 6.4 oz (81.8 kg)   SpO2 98%   BMI 28.25 kg/m²   GENERAL: well developed, well nourished,in no apparent distress  SKIN: no rashes,no suspicious lesions  HEAD: atraumatic, normocephalic, +tenderness on palpation of the left maxillary sinuses  EYES: PERRL. EOMI.  Conjunctiva normal.  Cornea clear.  Lid margins normal.   EARS: Left TM normal, + bulging, no retraction, no fluid, bony landmarks normal.   Right TM normal, + bulging, no retraction,  no fluid, bony landmarks normal.  NOSE: nostrils patent, + nasal drainage, nasal mucosa reddened and inflamed.   THROAT: oral mucosa pink, moist. No visible dental caries. Posterior pharynx is without erythema and without exudates.  Uvula is midline.    NECK: supple, non-tender  LUNGS: clear to auscultation bilaterally, no wheezes or rhonchi. Breathing is non labored.  CARDIO: RRR without murmur  EXTREMITIES: no cyanosis, clubbing or edema  LYMPH:  No lymphadenopathy.        ASSESSMENT AND PLAN:   Delroy Lanier is a 44 year old male who presents with Allergies (Right ear itchiness, difficulty hearing. Sinus Drainage/Began 11/16/Zyrtec and propionate fluticasone OTC). Symptoms are consistent with:      ASSESSMENT:  Encounter Diagnosis   Name Primary?    Acute pansinusitis, recurrence not specified Yes         PLAN: Meds as below.  Comfort care instructions as listed in Patient Instructions    Meds & Refills for this Visit:  Requested Prescriptions     Signed Prescriptions Disp Refills    amoxicillin clavulanate 875-125 MG Oral Tab 20 tablet 0     Sig: Take 1 tablet by mouth 2 (two) times daily for 10 days.       Risks, benefits, side effects of medication addressed and explained.    Patient Instructions   Augmentin  Continue Flonase and Zyrtec  Follow up with PCP   If worse seek treatment        The patient indicates understanding of these issues and agrees to the plan.  The patient is asked to return if sx's persist or worsen.

## 2024-12-16 ENCOUNTER — OFFICE VISIT (OUTPATIENT)
Dept: FAMILY MEDICINE CLINIC | Facility: CLINIC | Age: 44
End: 2024-12-16
Payer: COMMERCIAL

## 2024-12-16 VITALS
HEART RATE: 110 BPM | DIASTOLIC BLOOD PRESSURE: 80 MMHG | OXYGEN SATURATION: 99 % | SYSTOLIC BLOOD PRESSURE: 100 MMHG | TEMPERATURE: 99 F

## 2024-12-16 DIAGNOSIS — J06.9 VIRAL URI WITH COUGH: Primary | ICD-10-CM

## 2024-12-16 PROCEDURE — 99213 OFFICE O/P EST LOW 20 MIN: CPT | Performed by: NURSE PRACTITIONER

## 2024-12-16 NOTE — PROGRESS NOTES
CHIEF COMPLAINT:     Chief Complaint   Patient presents with    Cold     Cough, headache, slight temp - Entered by patient  S/s for 2-3 days.  OTC meds taken.         HPI:   Delroy Lanier is a 44 year old male who presents for upper respiratory symptoms for  1 days. Patient reports sore throat, congestion, dry cough, reports feels a bit warm, but temp at 99.  Reports symptoms started last night, feels fatigued today.  No wheezing/sob. No ear pain.  Symptoms have been worsening since onset.  Treating symptoms with otc meds. Daughter sick with similar symptoms, negative Covid and dx croup.     Current Outpatient Medications   Medication Sig Dispense Refill    Tirzepatide (MOUNJARO) 10 MG/0.5ML Subcutaneous Solution Pen-injector INJECT 1 SYRINGE UNDER THE SKIN ONCE A WEEK 6 mL 1    FREESTYLE DANILO 3 SENSOR Does not apply Misc APPLY 1  EVERY OTHER WEEK 6 each 3    metFORMIN  MG Oral Tablet 24 Hr Take 1 tablet (500 mg total) by mouth 2 (two) times daily with meals. 180 tablet 1    lisinopril 2.5 MG Oral Tab Take 1 tablet (2.5 mg total) by mouth daily. 90 tablet 3    atorvastatin 10 MG Oral Tab Take 1 tablet (10 mg total) by mouth nightly. 90 tablet 3    triamcinolone 55 MCG/ACT Nasal Aerosol 2 sprays by Nasal route daily. 2 each 1    montelukast 10 MG Oral Tab       Amphetamine-Dextroamphet ER 20 MG Oral Capsule SR 24 Hr Take 1 capsule (20 mg total) by mouth every morning.        Past Medical History:    Bell's palsy    Bell's palsy    Blood pressure elevated without history of HTN    COVID-19    COVID-19    Essential hypertension    Other and unspecified hyperlipidemia    Seasonal allergies    Type II or unspecified type diabetes mellitus without mention of complication, not stated as uncontrolled      Past Surgical History:   Procedure Laterality Date    Knee arthroscp harv  6/11    Rt. Knee         Social History     Socioeconomic History    Marital status:    Tobacco Use    Smoking status: Never     Smokeless tobacco: Never   Vaping Use    Vaping status: Never Used   Substance and Sexual Activity    Alcohol use: Yes     Comment: 2-3 beers a week    Drug use: No    Sexual activity: Yes   Other Topics Concern    Caffeine Concern Yes     Comment: coffee 3 cups daily    Exercise Yes     Comment: erratically    Seat Belt Yes         REVIEW OF SYSTEMS:   GENERAL: feels well otherwise,   ok appetite  SKIN: no rashes or abnormal skin lesions  HEENT: See HPI  LUNGS: denies shortness of breath or wheezing, See HPI  CARDIOVASCULAR: denies chest pain or palpitations   GI: denies N/V/C or abdominal pain  NEURO: Denies headaches    EXAM:   /80   Pulse 110   Temp 98.8 °F (37.1 °C) (Oral)   SpO2 99%   GENERAL: well developed, well nourished,in no apparent distress  SKIN: no rashes,no suspicious lesions  HEAD: atraumatic, normocephalic.  no tenderness on palpation of maxillary or frontal sinuses  EYES: conjunctiva clear, EOM intact  EARS: TM's pearly, no bulging, no retraction,mild serous fluid, bony landmarks visualized  NOSE: Nostrils patent, clear nasal discharge, nasal mucosa pink and moist  THROAT: Oral mucosa pink, moist. Posterior pharynx is not erythematous. no exudates. Tonsils 1/4.    NECK: Supple, non-tender  LUNGS: clear to auscultation bilaterally, no wheezes or rhonchi.  No crackles/rales, good air movement throughout. Breathing is non labored.  CARDIO: RRR without murmur  EXTREMITIES: no cyanosis, clubbing or edema  LYMPH:  No cervical lymphadenopathy.        ASSESSMENT AND PLAN:   Delroy Lanier is a 44 year old male who presents with     ASSESSMENT:   Encounter Diagnosis   Name Primary?    Viral URI with cough Yes       PLAN: Discussed viral vs bacterial etiology of URIs, including pharyngitis, laryngitis, bronchitis and sinus congestion/pain. Patient was informed that antibiotics are not effective for treating viral ailments and can result in antibiotic resistence. Reviewed symptom relief measures  with patient. Patient is  amenable to symptom relief measures, declines respiratory panel testing.  Follow up with PCP if no improvement in 3-5 days, sooner if worsening.  If any sob/wheezing seek emergent care.Comfort care as described in Patient Instructions    Meds & Refills for this Visit:  Requested Prescriptions      No prescriptions requested or ordered in this encounter       Risks, benefits, and side effects of medication explained and discussed.    There are no Patient Instructions on file for this visit.    The patient indicates understanding of these issues and agrees to the plan.  The patient is asked to return if sx's persist or worsen.

## 2024-12-18 ENCOUNTER — OFFICE VISIT (OUTPATIENT)
Dept: FAMILY MEDICINE CLINIC | Facility: CLINIC | Age: 44
End: 2024-12-18
Payer: COMMERCIAL

## 2024-12-18 ENCOUNTER — NURSE TRIAGE (OUTPATIENT)
Dept: FAMILY MEDICINE CLINIC | Facility: CLINIC | Age: 44
End: 2024-12-18

## 2024-12-18 VITALS
HEART RATE: 132 BPM | HEIGHT: 67 IN | SYSTOLIC BLOOD PRESSURE: 100 MMHG | BODY MASS INDEX: 28.88 KG/M2 | DIASTOLIC BLOOD PRESSURE: 70 MMHG | TEMPERATURE: 100 F | RESPIRATION RATE: 16 BRPM | WEIGHT: 184 LBS | OXYGEN SATURATION: 95 %

## 2024-12-18 DIAGNOSIS — R00.0 SINUS TACHYCARDIA: ICD-10-CM

## 2024-12-18 DIAGNOSIS — E11.29 TYPE 2 DIABETES MELLITUS WITH MICROALBUMINURIA, WITHOUT LONG-TERM CURRENT USE OF INSULIN (HCC): ICD-10-CM

## 2024-12-18 DIAGNOSIS — R05.1 ACUTE COUGH: Primary | ICD-10-CM

## 2024-12-18 DIAGNOSIS — R80.9 TYPE 2 DIABETES MELLITUS WITH MICROALBUMINURIA, WITHOUT LONG-TERM CURRENT USE OF INSULIN (HCC): ICD-10-CM

## 2024-12-18 DIAGNOSIS — J02.9 ACUTE SORE THROAT: ICD-10-CM

## 2024-12-18 LAB
COVID19 BINAX NOW ANTIGEN: NOT DETECTED
POCT LOT NUMBER: NORMAL

## 2024-12-18 PROCEDURE — 99213 OFFICE O/P EST LOW 20 MIN: CPT

## 2024-12-18 RX ORDER — AZITHROMYCIN 250 MG/1
TABLET, FILM COATED ORAL
Qty: 6 TABLET | Refills: 0 | Status: SHIPPED | OUTPATIENT
Start: 2024-12-18 | End: 2024-12-23

## 2024-12-18 RX ORDER — CETIRIZINE HYDROCHLORIDE 10 MG/1
10 TABLET ORAL DAILY
COMMUNITY

## 2024-12-18 RX ORDER — AZITHROMYCIN 250 MG/1
TABLET, FILM COATED ORAL
Qty: 6 TABLET | Refills: 0 | Status: CANCELLED | OUTPATIENT
Start: 2024-12-18 | End: 2024-12-23

## 2024-12-18 NOTE — PROGRESS NOTES
Covid test was negative.  Patient may take the azithromycin as prescribed and robitussin for symptoms.  He should also monitor his heart rate. If it is consisently above 100bpm, especially at rest, he needs to go to the ER for further evaluation.  It could possibly be elevated due to him being sick,

## 2024-12-18 NOTE — TELEPHONE ENCOUNTER
Action Requested: Summary for Provider     []  Critical Lab, Recommendations Needed  [] Need Additional Advice  [x]   FYI    []   Need Orders  [] Need Medications Sent to Pharmacy  []  Other     SUMMARY: Pt scheduled for OV evaluation with LEONOR Trivedi as MD Delfino schedule this week is full.    Reason for call: Cough  Onset: Sunday    Notes:  - Cough is worst symptom.  - 98.8F highest temp, fatigue, painful to cough/sneeze, chest feels achey, no SOB.  - Denies: SOB, difficulty breathing, hemoptysis, fever, wheezing  - Scratchy throat Sunday, went to [facility] Monday, wasn't prescribed any meds.  - Daughter dx with croup on Monday at Pediatrician, doing better since medications.  - Cough is dry/scratchy, wet at times but only sometimes productive (thick/white).   - Feels really cold, feels like head is hot on inside but wife states he feels normal to the touch, not registering fever on home thermometer.   - Doing humidifier/steam treatments, Mucinex, warm tea w/honey, OTC cough medicine, rest.  - Takes Lisinopril/Atorvastatin.    Future Appointments   Date Time Provider Department Center   12/18/2024  1:00 PM J[facility]       Reason for Disposition   Patient wants to be seen    Protocols used: Cough-A-[place]

## 2024-12-18 NOTE — PROGRESS NOTES
[facility] Family [facility] Office Note  Chief Complaint:   Chief Complaint   Patient presents with    Follow - Up     F/u [facility] 11/20/24 & 12/16/24, URI, chest hurting slightly when I talk and painful cough       HPI:   Delroy Lanier is a 44 year old male coming in for for multiple cold symptoms of a cough, fatigue, achy chest, and dry/scratchy throat.    Cough began on Sunday  Scratchy throat started on Monday, was scratcy and itchy on Sunday. Reports he has not been drinking much due to it hurting to swallow and just drank 3 16oz Gatorades prior to today's visit.  Very stuffy nose, sinuses starting to become painful  Can't recall any fevers, highest was during today's visit when MA  Has chills today, started lat night and today    Went to walk in clinic on Monday because he woke up in morning with pain in chest when he coughed and sneezed. At the walk in clinic, he declined a covid test due to having them at home but ended up not testing at home. Walk in clinic diagnosed him with viral uri with cough and did not prescribe any medications.  Reports his daughter was diagnosed with croup on Monday at her pediatrician and has been improving with medications.    Describes cough as forced and dry. Coughing up some mucus at times, milky whitish and thick    Just taking zyrtec and nasal spray for relief. Not taking mucinex    Has type 2 diabetes and his blood sugars have run from 250-260. Normally they run from .    He also noticed his heart rate has been running high since going to the urgent care, running in the 100-120s. Wears an apple watch that tracks his heart rate too    Denies any shortness of breath. Denies chest tightness  Reports chest pain is only from the cough.    Past Medical History:    Allergic rhinitis    Tree/mold    Bell's palsy    Bell's palsy    Blood pressure elevated without history of HTN    COVID-19    COVID-19    Essential hypertension    Other and unspecified hyperlipidemia     Seasonal allergies    Type II or unspecified type diabetes mellitus without mention of complication, not stated as uncontrolled     Past Surgical History:   Procedure Laterality Date    Knee arthroscp harv      Rt. Knee     Social History:  Social History     Socioeconomic History    Marital status:    Tobacco Use    Smoking status: Never    Smokeless tobacco: Never    Tobacco comments:     N/a   Vaping Use    Vaping status: Never Used   Substance and Sexual Activity    Alcohol use: Yes     Comment: Very casually. 1 to 2 times a month    Drug use: No    Sexual activity: Yes   Other Topics Concern    Caffeine Concern No    Stress Concern Yes     Comment: Work as a     Weight Concern Yes     Comment: Diabetic    Special Diet Yes     Comment: Diabetic. Tend to stay away from carbs    Exercise No    Seat Belt No     Family History:  Family History   Problem Relation Age of Onset    Heart Attack Paternal Grandmother     Diabetes Maternal Grandmother          due to complications of diabetes    Heart Disease Father     Other (diabetes mellitus) Father     Other (heart failure) Father     Heart Disorder Father          of heart failure    Stroke Father          of heart disease    Fibromyalgia Mother     Other (diabetes mellitus) Mother     Diabetes Mother         Currently diabetic    Hypertension Mother         Currently has hypertension    Other (respitory failure) Maternal Grandfather     Cancer Sister         Cervical and breast    Diabetes Brother         Diabetic     Allergies:  Allergies   Allergen Reactions    Allergy      Leaf mold      Grass     Mold     Pollen     Seasonal      Current Meds:  Current Outpatient Medications   Medication Sig Dispense Refill    cetirizine 10 MG Oral Tab Take 1 tablet (10 mg total) by mouth daily.      Tirzepatide (MOUNJARO) 10 MG/0.5ML Subcutaneous Solution Pen-injector INJECT 1 SYRINGE UNDER THE SKIN ONCE A WEEK 6 mL 1    FREESTYLE  DANILO 3 SENSOR Does not apply Misc APPLY 1  EVERY OTHER WEEK 6 each 3    metFORMIN  MG Oral Tablet 24 Hr Take 1 tablet (500 mg total) by mouth 2 (two) times daily with meals. 180 tablet 1    lisinopril 2.5 MG Oral Tab Take 1 tablet (2.5 mg total) by mouth daily. 90 tablet 3    atorvastatin 10 MG Oral Tab Take 1 tablet (10 mg total) by mouth nightly. 90 tablet 3    triamcinolone 55 MCG/ACT Nasal Aerosol 2 sprays by Nasal route daily. 2 each 1    Amphetamine-Dextroamphet ER 20 MG Oral Capsule SR 24 Hr Take 1 capsule (20 mg total) by mouth every morning.        Counseling given: Not Answered  Tobacco comments: N/a       REVIEW OF SYSTEMS:   Review of Systems   Constitutional:  Positive for chills and fatigue. Negative for diaphoresis and fever.   HENT:  Positive for congestion, postnasal drip, rhinorrhea and sore throat. Negative for ear pain, facial swelling, sinus pressure, sinus pain and sneezing.    Eyes:  Negative for pain and visual disturbance.   Respiratory:  Positive for cough. Negative for chest tightness, shortness of breath, wheezing and stridor.    Cardiovascular:  Positive for chest pain (reports only occurs when he coughs). Negative for palpitations.   Gastrointestinal:  Negative for abdominal pain, constipation, diarrhea, nausea and vomiting.   Musculoskeletal:  Negative for myalgias and neck stiffness.   Skin:  Negative for rash.   Neurological:  Negative for dizziness, syncope, weakness and headaches.        EXAM:   /70   Pulse (!) 132   Temp 99.7 °F (37.6 °C)   Resp 16   Ht 5' 7\" (1.702 m)   Wt 184 lb (83.5 kg)   SpO2 95%   BMI 28.82 kg/m²  Estimated body mass index is 28.82 kg/m² as calculated from the following:    Height as of this encounter: 5' 7\" (1.702 m).    Weight as of this encounter: 184 lb (83.5 kg).   Vital signs reviewed.Appears stated age, well groomed.  Physical Exam  Constitutional:       Appearance: He is ill-appearing.   HENT:      Head: Normocephalic and  atraumatic.      Right Ear: Tympanic membrane, ear canal and external ear normal.      Left Ear: Tympanic membrane, ear canal and external ear normal.      Nose: Congestion and rhinorrhea present.      Mouth/Throat:      Mouth: Mucous membranes are moist.      Pharynx: Oropharynx is clear.      Comments: No erythema of uvula. No exudate noted. No inflammation of tonsils noted  Eyes:      Extraocular Movements: Extraocular movements intact.      Conjunctiva/sclera: Conjunctivae normal.      Pupils: Pupils are equal, round, and reactive to light.   Cardiovascular:      Rate and Rhythm: Tachycardia present.      Pulses: Normal pulses.      Heart sounds: Normal heart sounds.   Pulmonary:      Effort: Pulmonary effort is normal.      Breath sounds: Normal breath sounds. No stridor. No wheezing, rhonchi or rales.   Chest:      Chest wall: No tenderness.   Abdominal:      General: Abdomen is flat. Bowel sounds are normal.      Palpations: Abdomen is soft.   Musculoskeletal:      Cervical back: Normal range of motion.   Skin:     General: Skin is warm and dry.      Capillary Refill: Capillary refill takes less than 2 seconds.   Neurological:      Mental Status: He is alert and oriented to person, place, and time.          ASSESSMENT AND PLAN:   1. Acute cough  Prescribed azithromycin due to Christopher's cough worsening since he visited the walk-in clinic Monday. I advised him that he should also take OTC Robitussin DM to act as a cough suppressant and cough expectorant.  I advised him comfort care measures of staying hydrated and continue to use the zyrtec and nasal saline to break up nasal and chest congestion.  Covid-19 test performed in office today was negative.  - azithromycin 250mg otal tab; take 2 tablets (500mg total) by mouth daily for day 1, then 1 tablet (250mg total) for 4 days. Dispense 6, ) refills    2. Acute sore throat  Advised comfort measures of cold food and liquids. Can also use cough drops. Scored 0  on [facility] so I did not see the need for a rapid strep test to be performed today.  - COVID19 BinaxNOW Antigen    3. Sinus tachycardia  Heart rate taken by medical assistant today was 132 bpm, I rechecked it with a pulse ox and was 120 bpm.  Discussed with Delroy that his heart rate could be high due to him having a bacterial infection since the COVID test was negative.  Delroy denies any shortness of breath, chest pain, or palpitations.  He was wearing an Apple Watch in clinic today and I compared it to the pulse ox and it was consistent with that 120 bpm reading.  I advised him to keep an eye on his heart rate by looking at his Apple Watch and if it is consistently above 100bpm, he should go to the ER for further evaluations.  Based on chart review I do see note from 9/7/2023 where Delroy visited the [facility] walk-in clinic for similar symptoms of runny nose and sinus congestion in which she did have tachycardia as well.  It could be possible that Delroy receives a higher heart rate when his body has infection based on today and previous notes reviewed previous notes reviewed.    4. Type 2 diabetes mellitus with microalbuminuria, without long-term current use of insulin (HCC)  At home blood sugars are elevated compared to his normal daily range based on what Mookeesha told me today.  I advised him to watch his blood sugar daily and be careful with his carb and sugar intake over the next few days.  This could be a result of him being sick.       Health Maintenance:  Health Maintenance Due   Topic Date Due    Pneumococcal Vaccine: Birth to 64yrs (1 of 2 - PCV) Never done    DTaP,Tdap,and Td Vaccines (2 - Td or Tdap) 03/27/2023    Annual Physical  10/25/2023    COVID-19 Vaccine (4 - 2024-25 season) 09/01/2024    Influenza Vaccine (1) 10/01/2024    Diabetes Care Dilated Eye Exam  02/06/2025       Patient/Caregiver Education: Patient/Caregiver Education: There are no barriers to learning.  Medical education done.   Outcome: Delroy Lanier verbalizes understanding, agrees with the plan, and had no other questions at the end of today's visit. Delroy Lanier is informed to call with any questions, complications, allergies, or worsening or changing symptoms.  Delroy Lanier is to call with any side effects or complications from the treatments as a result of today.     Problem List:  Patient Active Problem List   Diagnosis    Attention deficit disorder (ADD) without hyperactivity    Other testicular hypofunction    Vitamin D deficiency    Type 2 diabetes mellitus with microalbuminuria, without long-term current use of insulin (HCC)    Chronic maxillary sinusitis    Allergic rhinitis    Seasonal allergies    Other migraine without status migrainosus, not intractable    Mixed hyperlipidemia

## 2025-02-15 LAB
ALBUMIN/GLOBULIN RATIO: 1.5 (CALC) (ref 1–2.5)
ALBUMIN: 4.4 G/DL (ref 3.6–5.1)
ALKALINE PHOSPHATASE: 65 U/L (ref 36–130)
ALT: 16 U/L (ref 9–46)
AST: 12 U/L (ref 10–40)
BILIRUBIN, TOTAL: 0.3 MG/DL (ref 0.2–1.2)
BUN: 13 MG/DL (ref 7–25)
CALCIUM: 9.4 MG/DL (ref 8.6–10.3)
CARBON DIOXIDE: 29 MMOL/L (ref 20–32)
CHLORIDE: 104 MMOL/L (ref 98–110)
CHOL/HDLC RATIO: 3.5 (CALC)
CHOLESTEROL, TOTAL: 128 MG/DL
CREATININE: 0.64 MG/DL (ref 0.6–1.29)
EGFR: 121 ML/MIN/1.73M2
GLOBULIN: 2.9 G/DL (CALC) (ref 1.9–3.7)
GLUCOSE: 113 MG/DL (ref 65–99)
HDL CHOLESTEROL: 37 MG/DL
HEMOGLOBIN A1C: 6.1 % OF TOTAL HGB
LDL-CHOLESTEROL: 76 MG/DL (CALC)
NON-HDL CHOLESTEROL: 91 MG/DL (CALC)
POTASSIUM: 4.7 MMOL/L (ref 3.5–5.3)
PROTEIN, TOTAL: 7.3 G/DL (ref 6.1–8.1)
SODIUM: 140 MMOL/L (ref 135–146)
TRIGLYCERIDES: 66 MG/DL

## 2025-02-20 ENCOUNTER — PATIENT MESSAGE (OUTPATIENT)
Dept: ENDOCRINOLOGY CLINIC | Facility: CLINIC | Age: 45
End: 2025-02-20

## 2025-02-20 DIAGNOSIS — R80.9 TYPE 2 DIABETES MELLITUS WITH MICROALBUMINURIA, WITHOUT LONG-TERM CURRENT USE OF INSULIN (HCC): ICD-10-CM

## 2025-02-20 DIAGNOSIS — E11.29 TYPE 2 DIABETES MELLITUS WITH MICROALBUMINURIA, WITHOUT LONG-TERM CURRENT USE OF INSULIN (HCC): ICD-10-CM

## 2025-02-21 RX ORDER — HYDROCHLOROTHIAZIDE 12.5 MG/1
1 CAPSULE ORAL
Qty: 6 EACH | Refills: 3 | Status: SHIPPED | OUTPATIENT
Start: 2025-02-21

## 2025-02-21 RX ORDER — ACYCLOVIR 800 MG/1
TABLET ORAL
Qty: 6 EACH | Refills: 3 | Status: CANCELLED
Start: 2025-02-21

## 2025-02-21 NOTE — TELEPHONE ENCOUNTER
Requested Prescriptions     Pending Prescriptions Disp Refills    FREESTYLE DANILO 3 SENSOR Does not apply Misc 6 each 3     HGBA1C:    Lab Results   Component Value Date    A1C 6.1 (H) 02/14/2025    A1C 5.8 (H) 08/20/2024    A1C 6.0 (H) 05/18/2024     05/18/2024     Your Appointments      Monday February 24, 2025 7:30 AM  Diabetes Pump follow up with KEON Thao  [facility] [address] ([facility]) [address] [place] [id number] [phone]               Last Office Visit: 8--

## 2025-02-24 ENCOUNTER — OFFICE VISIT (OUTPATIENT)
Dept: ENDOCRINOLOGY CLINIC | Facility: CLINIC | Age: 45
End: 2025-02-24
Payer: COMMERCIAL

## 2025-02-24 ENCOUNTER — NURSE ONLY (OUTPATIENT)
Dept: ENDOCRINOLOGY CLINIC | Facility: CLINIC | Age: 45
End: 2025-02-24
Payer: COMMERCIAL

## 2025-02-24 VITALS
HEART RATE: 70 BPM | OXYGEN SATURATION: 97 % | RESPIRATION RATE: 18 BRPM | BODY MASS INDEX: 29 KG/M2 | DIASTOLIC BLOOD PRESSURE: 64 MMHG | SYSTOLIC BLOOD PRESSURE: 120 MMHG | WEIGHT: 187 LBS

## 2025-02-24 DIAGNOSIS — R80.9 TYPE 2 DIABETES MELLITUS WITH MICROALBUMINURIA, WITHOUT LONG-TERM CURRENT USE OF INSULIN (HCC): Primary | ICD-10-CM

## 2025-02-24 DIAGNOSIS — R80.9 TYPE 2 DIABETES MELLITUS WITH MICROALBUMINURIA, WITHOUT LONG-TERM CURRENT USE OF INSULIN (HCC): ICD-10-CM

## 2025-02-24 DIAGNOSIS — E11.29 TYPE 2 DIABETES MELLITUS WITH MICROALBUMINURIA, WITHOUT LONG-TERM CURRENT USE OF INSULIN (HCC): Primary | ICD-10-CM

## 2025-02-24 DIAGNOSIS — E78.2 MIXED HYPERLIPIDEMIA: ICD-10-CM

## 2025-02-24 DIAGNOSIS — E11.29 TYPE 2 DIABETES MELLITUS WITH MICROALBUMINURIA, WITHOUT LONG-TERM CURRENT USE OF INSULIN (HCC): ICD-10-CM

## 2025-02-24 PROCEDURE — 92229 IMG RTA DETC/MNTR DS POC ALY: CPT | Performed by: NURSE PRACTITIONER

## 2025-02-24 PROCEDURE — 82570 ASSAY OF URINE CREATININE: CPT | Performed by: NURSE PRACTITIONER

## 2025-02-24 PROCEDURE — 82043 UR ALBUMIN QUANTITATIVE: CPT | Performed by: NURSE PRACTITIONER

## 2025-02-24 RX ORDER — TIRZEPATIDE 10 MG/.5ML
10 INJECTION, SOLUTION SUBCUTANEOUS WEEKLY
Qty: 6 ML | Refills: 1 | Status: SHIPPED | OUTPATIENT
Start: 2025-02-24

## 2025-02-24 RX ORDER — METFORMIN HYDROCHLORIDE 500 MG/1
500 TABLET, EXTENDED RELEASE ORAL 2 TIMES DAILY WITH MEALS
Qty: 180 TABLET | Refills: 1 | Status: SHIPPED | OUTPATIENT
Start: 2025-02-24

## 2025-02-24 NOTE — PROGRESS NOTES
HPI:   Delroy aLnier is a 44 year old male who presents for follow up for the management of his diabetes.     Chief Complaint   Patient presents with    Diabetes     Follow up-Angela       Diabetes: stable, at goal  Type: 2   Duration:approximately 12 years  Current Meds: Metformin ER 500mg po bid, Mounjaro 10mg injection weekly   SE: denies  Long term insulin use: no  Failed Meds: Farxiga & Jardiance - mycotic infection, Metformin IR - diarrhea, Ozempic - GI effects    Complications:  Proteinuria    Personal Freestyle Angela CGM  Analysis of data: 2025 - 2025  % Time CGM is Active: 91%  Sensor download: full report  in media  Average glucose : 122 mg/dl   GMI: 6.2%    CV (coefficient of variation) : 19%     2% time above 180mg /dl   0% time above 250 mg/dl  98% time in target range:  mg/dl   0% time below target range: 70mg/dl     Evaluation   1. Baseline glucose stable and in target range  2. Minimal postprandial elevations  3. Low likelihood of hypoglycemia  4. Minimal glucose variability         Overall glucose control:   HGBA1C:    Lab Results   Component Value Date    A1C 6.1 (H) 2025    A1C 5.8 (H) 2024    A1C 6.0 (H) 2024     2024       Hypertension: stable, at goal  Blood Pressure: 120/64   Medication prescribed: lisinopril  SE: denies     Hyperlipidemia: stable  LDL: 76   Tri  Medication prescribed: atorvastatin  SE: denies     Wt Readings from Last 3 Encounters:   25 187 lb (84.8 kg)   24 184 lb (83.5 kg)   24 180 lb 6.4 oz (81.8 kg)     BP Readings from Last 3 Encounters:   25 120/64   24 100/70   24 100/80          Past History:   He  has a past medical history of Allergic rhinitis (2004), Bell's palsy, Bell's palsy, Blood pressure elevated without history of HTN, COVID-19 (07/15/2022), COVID-19 (10/15/2022), Essential hypertension, Other and unspecified hyperlipidemia, Seasonal allergies, and Type II or  unspecified type diabetes mellitus without mention of complication, not stated as uncontrolled.   His family history includes Cancer in his sister; Diabetes in his brother, maternal grandmother, and mother; Fibromyalgia in his mother; Heart Attack in his paternal grandmother; Heart Disease in his father; Heart Disorder in his father; Hypertension in his mother; Stroke in his father; diabetes mellitus in his father and mother; heart failure in his father; respitory failure in his maternal grandfather.   He  reports that he has never smoked. He has never used smokeless tobacco. He reports current alcohol use. He reports that he does not use drugs.     He is allergic to allergy, grass, mold, pollen, and seasonal.     Current Outpatient Medications on File Prior to Visit   Medication Sig    Continuous Glucose Sensor (FREESTYLE DANILO 3 PLUS SENSOR) Does not apply Misc 1 each every 15 (fifteen) days.    cetirizine 10 MG Oral Tab Take 1 tablet (10 mg total) by mouth daily.    Tirzepatide (MOUNJARO) 10 MG/0.5ML Subcutaneous Solution Pen-injector INJECT 1 SYRINGE UNDER THE SKIN ONCE A WEEK    FREESTYLE DANILO 3 SENSOR Does not apply Misc APPLY 1  EVERY OTHER WEEK    lisinopril 2.5 MG Oral Tab Take 1 tablet (2.5 mg total) by mouth daily.    atorvastatin 10 MG Oral Tab Take 1 tablet (10 mg total) by mouth nightly.    triamcinolone 55 MCG/ACT Nasal Aerosol 2 sprays by Nasal route daily.    Amphetamine-Dextroamphet ER 20 MG Oral Capsule SR 24 Hr Take 1 capsule (20 mg total) by mouth every morning.    [DISCONTINUED] metFORMIN  MG Oral Tablet 24 Hr Take 1 tablet (500 mg total) by mouth 2 (two) times daily with meals.     No current facility-administered medications on file prior to visit.       CMP  (most recent labs)   Lab Results   Component Value Date/Time     (H) 02/14/2025 07:42 AM    BUN 13 02/14/2025 07:42 AM    CREATSERUM 0.64 02/14/2025 07:42 AM    GFRNAA 117 06/16/2022 12:00 AM    GFRAA 147 07/15/2021 07:37  AM    EGFRCR 121 02/14/2025 07:42 AM    CA 9.4 02/14/2025 07:42 AM    ALKPHO 65 02/14/2025 07:42 AM    AST 12 02/14/2025 07:42 AM    ALT 16 02/14/2025 07:42 AM    BILT 0.3 02/14/2025 07:42 AM    TP 7.3 02/14/2025 07:42 AM    ALB 4.4 02/14/2025 07:42 AM     02/14/2025 07:42 AM    K 4.7 02/14/2025 07:42 AM     02/14/2025 07:42 AM    CO2 29 02/14/2025 07:42 AM           Lipids  (most recent labs)   Lab Results   Component Value Date/Time    CHOLEST 128 02/14/2025 07:42 AM    TRIG 66 02/14/2025 07:42 AM    HDL 37 (L) 02/14/2025 07:42 AM    LDL 76 02/14/2025 07:42 AM    NONHDLC 91 02/14/2025 07:42 AM          Diabetes  (most recent labs)   Lab Results   Component Value Date/Time    A1C 6.1 (H) 02/14/2025 07:42 AM          Microalb (most recent labs)   Lab Results   Component Value Date/Time    MICROALBUMIN 1.5 02/06/2024 08:07 AM    MICROALBCREA 41.7 (H) 05/18/2024 07:30 AM        Lab results reviewed with patient.    REVIEW OF SYSTEMS:   GENERAL HEALTH: feels well otherwise  SKIN: denies any unusual skin lesions or rashes  RESPIRATORY: denies shortness of breath with exertion  CARDIOVASCULAR: denies chest pain on exertion  GI: denies nausea, abdominal pain or heartburn  NEURO: denies headaches and denies numbness and tingling to extremities  ENDO: denies polydipsia, polyuria or polyphagia, denies unexplained weight changes    EXAM:   /64   Pulse 70   Resp 18   Wt 187 lb (84.8 kg)   SpO2 97%   BMI 29.29 kg/m²  Estimated body mass index is 29.29 kg/m² as calculated from the following:    Height as of 12/18/24: 5' 7\" (1.702 m).    Weight as of this encounter: 187 lb (84.8 kg).   Physical Exam  Vitals reviewed.   Constitutional:       Appearance: Normal appearance.   Cardiovascular:      Pulses:           Dorsalis pedis pulses are 2+ on the right side and 2+ on the left side.   Pulmonary:      Effort: Pulmonary effort is normal.   Feet:      Right foot:      Protective Sensation: 5 sites tested.  5  sites sensed.      Skin integrity: Dry skin present.      Toenail Condition: Right toenails are normal.      Left foot:      Protective Sensation: 5 sites tested.  5 sites sensed.      Skin integrity: Dry skin present.      Toenail Condition: Left toenails are normal.      Comments: Diabetes foot exam performed: Vibration to dorsum to the first toe perceived bilaterally.  Foot care practices reviewed with patient.    Neurological:      Mental Status: He is alert and oriented to person, place, and time.   Psychiatric:         Mood and Affect: Mood normal.         Behavior: Behavior normal.         ASSESSMENT AND PLAN:   As for his Diabetes, it is well controlled, stable, no significant medication side effects noted  Recommendations are: continue present meds, lose weight by increased dietary compliance and exercise, see ophthalmology soon, check feet daily, and will check labs as ordered.    Discussed increasing Mounjaro dose, patient prefers to stay on current regimen at this time.    Patient to continue Metformin ER 500mg po bid and Mounjaro 10mg injection once weekly.    Patient to continue to use personal Freestyle Angela 3 CGM for glucose monitoring.  Discussed self monitoring blood glucose and the importance of monitoring.  Patient agreed to monitoring bid - fasting and 2 hours postprandial of one meal per day if not using CGM.    Reinforced healthy eating in healthy portions and increasing daily physical activity.    Urine specimen obtained during office visit and sent to lab for urine microalbumin.         As for his hypertension, Blood Pressure is well controlled, stable, no significant medication side effects noted.   PLAN: will continue present medications, reviewed diet, exercise and weight control, continue ACEI therapy.     As for his cholesterol, Lipids are stable, no significant medication side effects noted.   PLAN: will continue present medications, reviewed diet, exercise and weight control,  continue statin therapy.    DM Health Maintenance  Last dilated eye exam: Last Dilated Eye Exam: 25   Exam shows retinopathy? Eye Exam shows Diabetic Retinopathy?: No    Last diabetic foot exam: Last Foot Exam: 25    Date of last PHQ-2 depression screen: PHQ-2 - Date of last depression screenin2025    Patient  reports that he has never smoked. He has never used smokeless tobacco.  When is flu vaccine due? Influenza Vaccine(1) due on 10/01/2024  When is pneumonia vaccine due? Pneumococcal Vaccination(1 of 2 - PCV) Never done    The patient indicates understanding of these issues and agrees to the plan.  Refills addressed at time of office visit.    Diagnoses and all orders for this visit:    Type 2 diabetes mellitus with microalbuminuria, without long-term current use of insulin (HCC)  -     metFORMIN  MG Oral Tablet 24 Hr; Take 1 tablet (500 mg total) by mouth 2 (two) times daily with meals.  -     Tirzepatide (MOUNJARO) 10 MG/0.5ML Subcutaneous Solution Auto-injector; Inject 10 mg into the skin once a week.  -     Diabetic Retinopathy Exam  OU - Both Eyes; Future    Mixed hyperlipidemia       Return in about 6 months (around 2025) for 45 minute appointment, Personal CGM.    The risks and benefits of my recommendations, as well as other treatment options were discussed with the patient today. Questions were answered to the best of my knowledge.   35 min spent with patient and >50% time spent counseling and coordinating care related to their office visit.      Cinthia HERBERT, BC-ADM, [facility]

## 2025-02-25 ENCOUNTER — PATIENT MESSAGE (OUTPATIENT)
Dept: ENDOCRINOLOGY CLINIC | Facility: CLINIC | Age: 45
End: 2025-02-25

## 2025-02-25 ENCOUNTER — OFFICE VISIT (OUTPATIENT)
Dept: FAMILY MEDICINE CLINIC | Facility: CLINIC | Age: 45
End: 2025-02-25
Payer: COMMERCIAL

## 2025-02-25 VITALS
BODY MASS INDEX: 29 KG/M2 | WEIGHT: 187 LBS | DIASTOLIC BLOOD PRESSURE: 68 MMHG | TEMPERATURE: 98 F | RESPIRATION RATE: 16 BRPM | SYSTOLIC BLOOD PRESSURE: 104 MMHG | HEART RATE: 85 BPM | OXYGEN SATURATION: 99 %

## 2025-02-25 DIAGNOSIS — R80.9 TYPE 2 DIABETES MELLITUS WITH MICROALBUMINURIA, WITHOUT LONG-TERM CURRENT USE OF INSULIN (HCC): Primary | ICD-10-CM

## 2025-02-25 DIAGNOSIS — J01.90 ACUTE VIRAL SINUSITIS: Primary | ICD-10-CM

## 2025-02-25 DIAGNOSIS — E11.29 TYPE 2 DIABETES MELLITUS WITH MICROALBUMINURIA, WITHOUT LONG-TERM CURRENT USE OF INSULIN (HCC): Primary | ICD-10-CM

## 2025-02-25 DIAGNOSIS — B97.89 ACUTE VIRAL SINUSITIS: Primary | ICD-10-CM

## 2025-02-25 DIAGNOSIS — J02.9 SORE THROAT: ICD-10-CM

## 2025-02-25 LAB
CONTROL LINE PRESENT WITH A CLEAR BACKGROUND (YES/NO): YES YES/NO
CREAT UR-SCNC: 227.5 MG/DL
KIT LOT #: NORMAL NUMERIC
MICROALBUMIN UR-MCNC: 7.3 MG/DL
MICROALBUMIN/CREAT 24H UR-RTO: 32.1 UG/MG (ref ?–30)
STREP GRP A CUL-SCR: NEGATIVE

## 2025-02-25 PROCEDURE — 87880 STREP A ASSAY W/OPTIC: CPT | Performed by: NURSE PRACTITIONER

## 2025-02-25 PROCEDURE — 99213 OFFICE O/P EST LOW 20 MIN: CPT | Performed by: NURSE PRACTITIONER

## 2025-02-25 NOTE — PROGRESS NOTES
CHIEF COMPLAINT:     Chief Complaint   Patient presents with    Sore Throat     C/o L ear pain, ST, L side tooth ache, runny nose, slight cough   Denies fever  Sx onset Sunday  Daughter +Strep       HPI:   Delroy aLnier is a 44 year old male who presents for cold symptoms for  2  days. Symptoms have progressed into sinus congestion and been worsening since onset. Sinus congestion/pain is described as a pressure and is located mainly in maxillary sinuses.  Patient also reports congestion, ear pain, sinus pain, prior history of sinusitis. reports dental pain. Has treated symptoms with Flonase.  Daughter + strep.       Current Outpatient Medications   Medication Sig Dispense Refill    metFORMIN  MG Oral Tablet 24 Hr Take 1 tablet (500 mg total) by mouth 2 (two) times daily with meals. 180 tablet 1    Tirzepatide (MOUNJARO) 10 MG/0.5ML Subcutaneous Solution Auto-injector Inject 10 mg into the skin once a week. 6 mL 1    Continuous Glucose Sensor (FREESTYLE DANILO 3 PLUS SENSOR) Does not apply Misc 1 each every 15 (fifteen) days. 6 each 3    cetirizine 10 MG Oral Tab Take 1 tablet (10 mg total) by mouth daily.      Tirzepatide (MOUNJARO) 10 MG/0.5ML Subcutaneous Solution Pen-injector INJECT 1 SYRINGE UNDER THE SKIN ONCE A WEEK 6 mL 1    FREESTYLE DANILO 3 SENSOR Does not apply Misc APPLY 1  EVERY OTHER WEEK 6 each 3    lisinopril 2.5 MG Oral Tab Take 1 tablet (2.5 mg total) by mouth daily. 90 tablet 3    atorvastatin 10 MG Oral Tab Take 1 tablet (10 mg total) by mouth nightly. 90 tablet 3    triamcinolone 55 MCG/ACT Nasal Aerosol 2 sprays by Nasal route daily. 2 each 1    Amphetamine-Dextroamphet ER 20 MG Oral Capsule SR 24 Hr Take 1 capsule (20 mg total) by mouth every morning.        Past Medical History:    Allergic rhinitis    Tree/mold    Bell's palsy    Bell's palsy    Blood pressure elevated without history of HTN    COVID-19    COVID-19    Essential hypertension    Other and unspecified hyperlipidemia     Seasonal allergies    Type II or unspecified type diabetes mellitus without mention of complication, not stated as uncontrolled      Past Surgical History:   Procedure Laterality Date    Knee arthroscp harv      Rt. Knee      Family History   Problem Relation Age of Onset    Heart Attack Paternal Grandmother     Diabetes Maternal Grandmother          due to complications of diabetes    Heart Disease Father     Other (diabetes mellitus) Father     Other (heart failure) Father     Heart Disorder Father          of heart failure    Stroke Father          of heart disease    Fibromyalgia Mother     Other (diabetes mellitus) Mother     Diabetes Mother         Currently diabetic    Hypertension Mother         Currently has hypertension    Other (respitory failure) Maternal Grandfather     Cancer Sister         Cervical and breast    Diabetes Brother         Diabetic      Social History     Socioeconomic History    Marital status:    Tobacco Use    Smoking status: Never    Smokeless tobacco: Never    Tobacco comments:     N/a   Vaping Use    Vaping status: Never Used   Substance and Sexual Activity    Alcohol use: Yes     Comment: Very casually. 1 to 2 times a month    Drug use: No    Sexual activity: Yes   Other Topics Concern    Caffeine Concern No    Stress Concern Yes     Comment: Work as a     Weight Concern Yes     Comment: Diabetic    Special Diet Yes     Comment: Diabetic. Tend to stay away from carbs    Exercise No    Seat Belt No         REVIEW OF SYSTEMS:   GENERAL:  denies  diminished appetite  SKIN: no rashes or abnormal skin lesions  HEENT: See HPI.    LUNGS: denies shortness of breath or wheezing, See HPI  CARDIOVASCULAR: denies chest pain or palpitations   GI: denies N/V/C or abdominal pain  NEURO: + sinus headaches.  No numbness or tingling in face.    EXAM:   /68   Pulse 85   Temp 98.3 °F (36.8 °C) (Oral)   Resp 16   Wt 187 lb (84.8 kg)   SpO2 99%    BMI 29.29 kg/m²   GENERAL: well developed, well nourished,in no apparent distress  SKIN: no rashes,no suspicious lesions  HEAD: atraumatic, normocephalic, + tenderness on palpation of maxillary sinuses  EYES: conjunctiva clear, EOM intact  EARS: TM's without erythema, mild bulging, no retraction, + fluid, bony landmarks visible  NOSE: nostrils patent, clear nasal mucous, nasal mucosa reddened and inflamed  THROAT: oral mucosa pink, moist. No visible dental caries. Posterior pharynx is not erythematous.  NECK: supple, non-tender  LUNGS: Breathing is non labored. Lungs clear to auscultation bilaterally, no wheezes or rhonchi.   CARDIO: RRR without murmur  EXTREMITIES: no cyanosis, clubbing or edema  LYMPH:  no cervical lymphadenopathy.      Recent Results (from the past 24 hours)   Rapid Strep    Collection Time: 02/25/25 12:55 PM   Result Value Ref Range    Strep Grp A Screen negative Negative    Control Line Present with a clear background (yes/no) yes Yes/No    Kit Lot # [id number] Numeric    Kit Expiration Date 12/20/2025 Date   '    ASSESSMENT AND PLAN:   eDlroy Lanier is a 44 year old male who presents with URI symptoms that are consistent with:      ASSESSMENT:  Encounter Diagnoses   Name Primary?    Sore throat     Acute viral sinusitis Yes         PLAN: Meds and Comfort care instructions as listed in Patient Instructions    Meds & Refills for this Visit:  Requested Prescriptions      No prescriptions requested or ordered in this encounter       Risks, benefits, side effects of medication addressed and explained.    Patient Instructions   I recommend the 4 H's for inflammation:    1. Heat (warm mist from the shower or warm liquids such as tea)  2. Honey (mixed in your tea or by the spoonful [if you are not diabetic; over the age of 1 year]--take a spoonful 3 times a day and don't eat or drink anything for 15-20 minutes)  3. Humidity--cool mist in the bedroom at night  4. Hydration --at least 8 -10 glasses a  day     Flonase daily  Benadryl at night    The patient indicates understanding of these issues and agrees to the plan.  The patient is asked to f/u with PCP if sx's persist or worsen.

## 2025-02-25 NOTE — TELEPHONE ENCOUNTER
Patient sent My Chart Message related to labs completed - patient noted increase to Creatinine number - saw Microalbumin/Creatinine was elevated above 30. Okay to just advise patient of usual recommendations related to this finding via My Chart Message, keep blood sugar/pressure in target ranges, avoid NSAID's, etc.

## 2025-02-27 RX ORDER — TIRZEPATIDE 12.5 MG/.5ML
12.5 INJECTION, SOLUTION SUBCUTANEOUS WEEKLY
Qty: 2 ML | Refills: 0 | Status: SHIPPED | OUTPATIENT
Start: 2025-02-27

## 2025-02-27 NOTE — TELEPHONE ENCOUNTER
Contacted patient as requested to discuss urine microalbumin result.  Patient's proteinuria is improving and will continue ACEI therapy at this time.  While on call patient requesting to increase Mounjaro to 12.5mg injection weekly.  Prescription sent to pharmacy as requested.  Patient has no further questions at this time.    Cinthia HERBERT, BC-ADM, [facility]

## 2025-03-21 ENCOUNTER — PATIENT MESSAGE (OUTPATIENT)
Dept: ENDOCRINOLOGY CLINIC | Facility: CLINIC | Age: 45
End: 2025-03-21

## 2025-03-24 NOTE — TELEPHONE ENCOUNTER
Patient to continue Mounjaro 12.5mg injection weekly x 2 doses. If patient would like to increase dose of Mounjaro to 15mg injection weekly once 12.5mg supply complete please have him message office.  The increased dose may decrease appetite.    Cinthia HERBERT, BC-ADM, [facility]

## 2025-04-08 DIAGNOSIS — R80.9 TYPE 2 DIABETES MELLITUS WITH MICROALBUMINURIA, WITHOUT LONG-TERM CURRENT USE OF INSULIN (HCC): ICD-10-CM

## 2025-04-08 DIAGNOSIS — E11.29 TYPE 2 DIABETES MELLITUS WITH MICROALBUMINURIA, WITHOUT LONG-TERM CURRENT USE OF INSULIN (HCC): ICD-10-CM

## 2025-04-08 RX ORDER — TIRZEPATIDE 12.5 MG/.5ML
12.5 INJECTION, SOLUTION SUBCUTANEOUS WEEKLY
Qty: 2 ML | Refills: 0 | OUTPATIENT
Start: 2025-04-08

## 2025-04-08 RX ORDER — TIRZEPATIDE 12.5 MG/.5ML
INJECTION, SOLUTION SUBCUTANEOUS
Qty: 2 ML | Refills: 0 | Status: SHIPPED | OUTPATIENT
Start: 2025-04-08 | End: 2025-04-10

## 2025-04-08 NOTE — TELEPHONE ENCOUNTER
Requested Prescriptions     Pending Prescriptions Disp Refills    MOUNJARO 12.5 MG/0.5ML Subcutaneous Solution Auto-injector [Pharmacy Med Name: Mounjaro Subcutaneous Solution Auto-injector 12.5 MG/0.5ML] 2 mL 0     Sig: Inject 12.5 mg (0.5ml) into the skin once a week.     Future Appointments   Date Time Provider Department Center   8/25/2025  7:30 AM Cinthia Espinoza APRN [id number]     Last A1c value was 6.1% done 2/14/2025.  Refill 02/27/25 D.Alexis   LV 02/24/25 D.Alexis

## 2025-04-10 ENCOUNTER — PATIENT MESSAGE (OUTPATIENT)
Dept: ENDOCRINOLOGY CLINIC | Facility: CLINIC | Age: 45
End: 2025-04-10

## 2025-04-10 DIAGNOSIS — R80.9 TYPE 2 DIABETES MELLITUS WITH MICROALBUMINURIA, WITHOUT LONG-TERM CURRENT USE OF INSULIN (HCC): ICD-10-CM

## 2025-04-10 DIAGNOSIS — E11.29 TYPE 2 DIABETES MELLITUS WITH MICROALBUMINURIA, WITHOUT LONG-TERM CURRENT USE OF INSULIN (HCC): ICD-10-CM

## 2025-04-10 RX ORDER — TIRZEPATIDE 12.5 MG/.5ML
INJECTION, SOLUTION SUBCUTANEOUS
Qty: 2 ML | Refills: 0 | Status: CANCELLED
Start: 2025-04-10

## 2025-04-10 RX ORDER — TIRZEPATIDE 12.5 MG/.5ML
12.5 INJECTION, SOLUTION SUBCUTANEOUS WEEKLY
Qty: 2 ML | Refills: 0 | Status: SHIPPED | OUTPATIENT
Start: 2025-04-10

## 2025-04-10 NOTE — TELEPHONE ENCOUNTER
Patient spoke with pharmacy- pharmacy did not receive prescription.    Requested Prescriptions     Pending Prescriptions Disp Refills    Tirzepatide (MOUNJARO) 12.5 MG/0.5ML Subcutaneous Solution Auto-injector 2 mL 0     HGBA1C:    Lab Results   Component Value Date    A1C 6.1 (H) 02/14/2025    A1C 5.8 (H) 08/20/2024    A1C 6.0 (H) 05/18/2024     05/18/2024     Your Appointments      Monday August 25, 2025 7:30 AM  Diabetes Pump follow up with KEON Thao  [facility] [address] ([facility] [place]) [address] [facility] [address] [phone]               Last Office Visit:  2--    Last Refill: 4-8-2025-pharmacy did not receive    
Sent 4-8-2025.    
Continue Regimen: Ketoconazole 2% cream BID
Detail Level: Zone
Render In Strict Bullet Format?: No

## 2025-05-14 DIAGNOSIS — R80.9 TYPE 2 DIABETES MELLITUS WITH MICROALBUMINURIA, WITHOUT LONG-TERM CURRENT USE OF INSULIN (HCC): ICD-10-CM

## 2025-05-14 DIAGNOSIS — E11.29 TYPE 2 DIABETES MELLITUS WITH MICROALBUMINURIA, WITHOUT LONG-TERM CURRENT USE OF INSULIN (HCC): ICD-10-CM

## 2025-05-15 RX ORDER — HYDROCHLOROTHIAZIDE 12.5 MG/1
1 CAPSULE ORAL
Qty: 6 EACH | Refills: 3 | Status: SHIPPED | OUTPATIENT
Start: 2025-05-15

## 2025-05-15 RX ORDER — TIRZEPATIDE 12.5 MG/.5ML
12.5 INJECTION, SOLUTION SUBCUTANEOUS WEEKLY
Qty: 2 ML | Refills: 0 | OUTPATIENT
Start: 2025-05-15

## 2025-05-15 RX ORDER — TIRZEPATIDE 12.5 MG/.5ML
12.5 INJECTION, SOLUTION SUBCUTANEOUS WEEKLY
Qty: 6 ML | Refills: 0 | Status: SHIPPED | OUTPATIENT
Start: 2025-05-15

## 2025-05-15 NOTE — TELEPHONE ENCOUNTER
Requested Prescriptions     Pending Prescriptions Disp Refills    Continuous Glucose Sensor (FREESTYLE DANILO 3 PLUS SENSOR) Does not apply Misc 6 each 3     Si each every 15 (fifteen) days.    Tirzepatide (MOUNJARO) 12.5 MG/0.5ML Subcutaneous Solution Auto-injector 2 mL 0     Sig: Inject 12.5 mg into the skin once a week.     HGBA1C:    Lab Results   Component Value Date    A1C 6.1 (H) 2025    A1C 5.8 (H) 2024    A1C 6.0 (H) 2024     2024     Your Appointments      2025 7:30 AM  Diabetes Pump follow up with KEON Thao  [facility] [address] ([facility] [place]) [address] [place] [id number] [phone]               Last Office Visit:  2025-    Last Refill:  4-- 2 ml with 0 refills-    
Implemented All Universal Safety Interventions:  [place] to call system. Call bell, personal items and telephone within reach. Instruct patient to call for assistance. Room bathroom lighting operational. Non-slip footwear when patient is off stretcher. Physically safe environment: no spills, clutter or unnecessary equipment. Stretcher in lowest position, wheels locked, appropriate side rails in place.

## 2025-07-28 ENCOUNTER — TELEPHONE (OUTPATIENT)
Dept: ENDOCRINOLOGY CLINIC | Facility: CLINIC | Age: 45
End: 2025-07-28

## 2025-07-28 NOTE — TELEPHONE ENCOUNTER
Diabetic eye exam received from :    Dr. Mikie Fernando  NO KEON Franklin reviewed and flowsheet updated.      Sent to scanning

## 2025-08-06 DIAGNOSIS — R80.9 TYPE 2 DIABETES MELLITUS WITH MICROALBUMINURIA, WITHOUT LONG-TERM CURRENT USE OF INSULIN (HCC): ICD-10-CM

## 2025-08-06 DIAGNOSIS — E11.29 TYPE 2 DIABETES MELLITUS WITH MICROALBUMINURIA, WITHOUT LONG-TERM CURRENT USE OF INSULIN (HCC): ICD-10-CM

## 2025-08-07 RX ORDER — TIRZEPATIDE 12.5 MG/.5ML
12.5 INJECTION, SOLUTION SUBCUTANEOUS WEEKLY
Qty: 6 ML | Refills: 0 | Status: SHIPPED | OUTPATIENT
Start: 2025-08-07

## 2025-08-25 ENCOUNTER — OFFICE VISIT (OUTPATIENT)
Dept: ENDOCRINOLOGY CLINIC | Facility: CLINIC | Age: 45
End: 2025-08-25

## 2025-08-25 VITALS
BODY MASS INDEX: 28 KG/M2 | WEIGHT: 180 LBS | RESPIRATION RATE: 16 BRPM | HEART RATE: 70 BPM | DIASTOLIC BLOOD PRESSURE: 64 MMHG | SYSTOLIC BLOOD PRESSURE: 110 MMHG | OXYGEN SATURATION: 98 %

## 2025-08-25 DIAGNOSIS — E11.29 TYPE 2 DIABETES MELLITUS WITH MICROALBUMINURIA, WITHOUT LONG-TERM CURRENT USE OF INSULIN (HCC): Primary | ICD-10-CM

## 2025-08-25 DIAGNOSIS — R80.9 TYPE 2 DIABETES MELLITUS WITH MICROALBUMINURIA, WITHOUT LONG-TERM CURRENT USE OF INSULIN (HCC): Primary | ICD-10-CM

## 2025-08-25 DIAGNOSIS — E78.2 MIXED HYPERLIPIDEMIA: ICD-10-CM

## 2025-08-25 LAB — HEMOGLOBIN A1C: 6.3 % (ref 4.3–5.6)

## (undated) DIAGNOSIS — R80.9 TYPE 2 DIABETES MELLITUS WITH MICROALBUMINURIA, WITHOUT LONG-TERM CURRENT USE OF INSULIN (HCC): ICD-10-CM

## (undated) DIAGNOSIS — E11.29 TYPE 2 DIABETES MELLITUS WITH MICROALBUMINURIA, WITHOUT LONG-TERM CURRENT USE OF INSULIN (HCC): ICD-10-CM

## (undated) NOTE — MR AVS SNAPSHOT
[address] [facility] [address] [phone]               Thank you for choosing us for your health care visit with LEONOR Robles.   We are glad to serve you and happy to provide you with this summary of y Lipid Panel [E]    Complete by:  Feb 15, 2017 (Approximate)    Assoc Dx:  Type 2 diabetes mellitus with microalbuminuria, without long-term current use of insulin (HCC) [E11.29, R80.9]           Vitamin D, 25-Hydroxy [E]    Complete by:  Feb 15, 2017 (Eduin

## (undated) NOTE — MR AVS SNAPSHOT
[address]  [address]  [address] [place] [address] [address]  [phone]               Thank you for choosing us for your health care visit with Tera Yadav PA-C.   We are glad to serve you and happy to provide you with this sum Medicines  Your doctor may prescribe medications to help treat your sinusitis. If you have an infection, antibiotics can help clear it up. If you are prescribed antibiotics, take all pills on schedule until they are gone, even if you feel better.  Decongest you stay away from dust.  · Sit in the nonsmoking sections of restaurants. · Don't go outdoors during peak pollution hours such as rush hour. · Keep an air conditioner on during allergy season. Clean its filter regularly.   · Ask your healthcare provider Take 1 tablet by mouth daily. Commonly known as:  GLYXAMBI           levofloxacin 500 MG Tabs   Take 1 tablet (500 mg total) by mouth daily.    Commonly known as:  LEVAQUIN           MetFORMIN HCl 1000 MG Tabs   TAKE 1 TABLET BY MOUTH TWICE DAILY WITH TE

## (undated) NOTE — Clinical Note
Thank you for your referrral, started with a lot of reluctance combo glp1 and basal insulin for pancreatic rest, goal to move to orals to get diab ed will adjust accordingly, will keep you posted thanks  Also thank you for the gift card, I really appreciat

## (undated) NOTE — Clinical Note
Today a1c at 8.3 from 12 doing a lot better, will check again in 8 weeks, hopefully will  Be to goal thanks

## (undated) NOTE — Clinical Note
bg trending down, still not to goal, feel he will be in next month have eye report will keep you posted thanks

## (undated) NOTE — MR AVS SNAPSHOT
[address] [facility] [address] [phone]               Thank you for choosing us for your health care visit with Nate Gasca MD.  We are glad to serve you and happy to provide you with this summary of your your doctor or other care provider to review them with you.          Where to Get Your Medications      These medications were sent to [facility] [address] [place], [address] AT [address] , [phone], [phone]

## (undated) NOTE — ED AVS SNAPSHOT
Edith Jaramillo   MRN: [id number]    Department:  [facility] Emergency Department in [place]   Date of Visit:  2/2/2018           Disclosure     Insurance plans vary and the physician(s) referred by the ER may not be covered by your plan.  Please conta tell this physician (or your personal doctor if your instructions are to return to your personal doctor) about any new or lasting problems. The primary care or specialist physician will see patients referred from the [facility] Emergency Department.  Wagner Craven

## (undated) NOTE — Clinical Note
06/02/2017        [address]      Dear Natalia Rizo,    [address] [facility] records indicate that you have outstanding lab work and or testing that was ordered for you and has not yet been completed:      Microalb/Creat Ratio, Prem

## (undated) NOTE — LETTER
05/04/18        [facility]  [address] [facility] [address]      Dear Candice Garcia,    [facility] records indicate that you have outstanding lab work and or testing that was ordered for you and has not yet been completed:          Vitamin D, 25-Hydrox

## (undated) NOTE — LETTER
09/18/17        [address]      Dear Scotty Hunter,    [address] [facility] records indicate that you have outstanding lab work and or testing that was ordered for you and has not yet been completed:          Vitamin D, 25-Hydroxy  To

## (undated) NOTE — ED AVS SNAPSHOT
Mulu Garcia   MRN: [id number]    Department:  [facility] Emergency Department in [place]   Date of Visit:  12/9/2018           Disclosure     Insurance plans vary and the physician(s) referred by the ER may not be covered by your plan.  Please cont tell this physician (or your personal doctor if your instructions are to return to your personal doctor) about any new or lasting problems. The primary care or specialist physician will see patients referred from the [facility] Emergency Department.  Lachelle Regan

## (undated) NOTE — ED AVS SNAPSHOT
Hugo Maciel   MRN: [id number]    Department:  [facility] Emergency Department in [place]   Date of Visit:  4/21/2018           Disclosure     Insurance plans vary and the physician(s) referred by the ER may not be covered by your plan.  Please cont tell this physician (or your personal doctor if your instructions are to return to your personal doctor) about any new or lasting problems. The primary care or specialist physician will see patients referred from the [facility] Emergency Department.  Shelton Lombard

## (undated) NOTE — MR AVS SNAPSHOT
[address] [facility] [id number] [phone]               Thank you for choosing us for your health care visit with Karlie Dover MD.  We are glad to serve you and happy to provide you with this summary of your [employer] questions? Call [phone] for help. [employer] is NOT to be used for urgent needs. For medical emergencies, dial 911.            Visit [facility] online at  [email or web address]

## (undated) NOTE — Clinical Note
a1c still in 8's d/t having Bell's Palsy and steroids, added farxiga today should be to goal now thanks also added pravastatin for ldl

## (undated) NOTE — LETTER
Date: 2/20/2024    Patient Name: Delroy Lanier          To Whom it may concern:    This letter has been written at the patient's request. The above patient was seen at the [facility] for treatment of a medical condition.    The patient may return to work on 2/20/2024 with no limitations.        Sincerely,        KEON Thao

## (undated) NOTE — LETTER
Date: 2/20/2024    Patient Name: Delroy Lanier          To Whom it may concern:    This letter has been written at the patient's request. The above patient was seen at the [facility] for treatment of a medical condition.      The patient may return to work on 3/15/2024 with no limitations.        Sincerely,      KEON Thao

## (undated) NOTE — Clinical Note
Thank you for your referrral, started with a lot of reluctance combo glp1 and basal insulin for pancreatic rest, goal to move to orals to get diab ed will adjust accordingly, will keep you posted thanks

## (undated) NOTE — MR AVS SNAPSHOT
[address]  [address]  [address] [id number]  [phone]               Thank you for choosing us for your health care visit with Citlaly Machuca PA-C.   We are glad to serve you and happy to provide you with this summar You can access your MyChart to more actively manage your health care and view more details from this visit by going to [email or web address].   If you've recently had a stay at the Hospital you can access your discharge instructions in [address] by miranda You don’t need to join a gym. Home exercises work great.  Put more priority on exercise in your life                    Visit [facility] online at  [email or web address]

## (undated) NOTE — Clinical Note
05/22/2017        Melvin Rodriguez  [address] [address]      Dear Whitney Milligan,    [address] [facility] records indicate that you have outstanding lab work and or testing that was ordered for you and has not yet been completed:      Hemoglobin A1C [E]  To prov